# Patient Record
Sex: FEMALE | Race: WHITE | Employment: PART TIME | URBAN - METROPOLITAN AREA
[De-identification: names, ages, dates, MRNs, and addresses within clinical notes are randomized per-mention and may not be internally consistent; named-entity substitution may affect disease eponyms.]

---

## 2017-01-20 ENCOUNTER — LAB ENCOUNTER (OUTPATIENT)
Dept: LAB | Age: 52
End: 2017-01-20
Attending: FAMILY MEDICINE
Payer: COMMERCIAL

## 2017-01-20 ENCOUNTER — OFFICE VISIT (OUTPATIENT)
Dept: FAMILY MEDICINE CLINIC | Facility: CLINIC | Age: 52
End: 2017-01-20

## 2017-01-20 VITALS
RESPIRATION RATE: 18 BRPM | SYSTOLIC BLOOD PRESSURE: 112 MMHG | HEART RATE: 74 BPM | HEIGHT: 58.5 IN | WEIGHT: 127 LBS | TEMPERATURE: 99 F | BODY MASS INDEX: 25.95 KG/M2 | DIASTOLIC BLOOD PRESSURE: 68 MMHG

## 2017-01-20 DIAGNOSIS — F39 MOOD DISORDER (HCC): ICD-10-CM

## 2017-01-20 DIAGNOSIS — L70.0 CYSTIC ACNE: ICD-10-CM

## 2017-01-20 DIAGNOSIS — Z00.00 ANNUAL PHYSICAL EXAM: Primary | ICD-10-CM

## 2017-01-20 DIAGNOSIS — Z13.820 SCREENING FOR OSTEOPOROSIS: ICD-10-CM

## 2017-01-20 DIAGNOSIS — Z12.31 ENCOUNTER FOR SCREENING MAMMOGRAM FOR HIGH-RISK PATIENT: ICD-10-CM

## 2017-01-20 DIAGNOSIS — Z00.00 ANNUAL PHYSICAL EXAM: ICD-10-CM

## 2017-01-20 LAB
25-HYDROXYVITAMIN D (TOTAL): 40.3 NG/ML (ref 30–100)
ALBUMIN SERPL-MCNC: 3.9 G/DL (ref 3.5–4.8)
ALP LIVER SERPL-CCNC: 51 U/L (ref 39–100)
ALT SERPL-CCNC: 19 U/L (ref 14–54)
AST SERPL-CCNC: 14 U/L (ref 15–41)
BASOPHILS # BLD AUTO: 0.04 X10(3) UL (ref 0–0.1)
BASOPHILS NFR BLD AUTO: 0.6 %
BILIRUB SERPL-MCNC: 0.4 MG/DL (ref 0.1–2)
BUN BLD-MCNC: 15 MG/DL (ref 8–20)
CALCIUM BLD-MCNC: 8.8 MG/DL (ref 8.3–10.3)
CHLORIDE: 103 MMOL/L (ref 101–111)
CHOLEST SMN-MCNC: 210 MG/DL (ref ?–200)
CO2: 31 MMOL/L (ref 22–32)
CREAT BLD-MCNC: 0.83 MG/DL (ref 0.55–1.02)
EOSINOPHIL # BLD AUTO: 0.09 X10(3) UL (ref 0–0.3)
EOSINOPHIL NFR BLD AUTO: 1.4 %
ERYTHROCYTE [DISTWIDTH] IN BLOOD BY AUTOMATED COUNT: 13 % (ref 11.5–16)
FREE T4: 1.1 NG/DL (ref 0.9–1.8)
GLUCOSE BLD-MCNC: 90 MG/DL (ref 70–99)
HAV AB SERPL IA-ACNC: 344 PG/ML (ref 193–986)
HCT VFR BLD AUTO: 41.9 % (ref 34–50)
HDLC SERPL-MCNC: 102 MG/DL (ref 45–?)
HDLC SERPL: 2.06 {RATIO} (ref ?–4.44)
HGB BLD-MCNC: 14 G/DL (ref 12–16)
IMMATURE GRANULOCYTE COUNT: 0.02 X10(3) UL (ref 0–1)
IMMATURE GRANULOCYTE RATIO %: 0.3 %
LDLC SERPL CALC-MCNC: 98 MG/DL (ref ?–130)
LYMPHOCYTES # BLD AUTO: 1.37 X10(3) UL (ref 0.9–4)
LYMPHOCYTES NFR BLD AUTO: 21.5 %
M PROTEIN MFR SERPL ELPH: 7.1 G/DL (ref 6.1–8.3)
MCH RBC QN AUTO: 31 PG (ref 27–33.2)
MCHC RBC AUTO-ENTMCNC: 33.4 G/DL (ref 31–37)
MCV RBC AUTO: 92.7 FL (ref 81–100)
MONOCYTES # BLD AUTO: 0.59 X10(3) UL (ref 0.1–0.6)
MONOCYTES NFR BLD AUTO: 9.3 %
NEUTROPHIL ABS PRELIM: 4.26 X10 (3) UL (ref 1.3–6.7)
NEUTROPHILS # BLD AUTO: 4.26 X10(3) UL (ref 1.3–6.7)
NEUTROPHILS NFR BLD AUTO: 66.9 %
NONHDLC SERPL-MCNC: 108 MG/DL (ref ?–130)
PLATELET # BLD AUTO: 237 10(3)UL (ref 150–450)
POTASSIUM SERPL-SCNC: 4 MMOL/L (ref 3.6–5.1)
RBC # BLD AUTO: 4.52 X10(6)UL (ref 3.8–5.1)
RED CELL DISTRIBUTION WIDTH-SD: 43.9 FL (ref 35.1–46.3)
SODIUM SERPL-SCNC: 141 MMOL/L (ref 136–144)
T3FREE SERPL-MCNC: 2.73 PG/ML (ref 2.3–4.2)
TRIGLYCERIDES: 51 MG/DL (ref ?–150)
TSI SER-ACNC: 1.2 MIU/ML (ref 0.35–5.5)
VLDL: 10 MG/DL (ref 5–40)
WBC # BLD AUTO: 6.4 X10(3) UL (ref 4–13)

## 2017-01-20 PROCEDURE — 85025 COMPLETE CBC W/AUTO DIFF WBC: CPT

## 2017-01-20 PROCEDURE — 82306 VITAMIN D 25 HYDROXY: CPT

## 2017-01-20 PROCEDURE — 80061 LIPID PANEL: CPT

## 2017-01-20 PROCEDURE — 84439 ASSAY OF FREE THYROXINE: CPT

## 2017-01-20 PROCEDURE — 84443 ASSAY THYROID STIM HORMONE: CPT

## 2017-01-20 PROCEDURE — 84481 FREE ASSAY (FT-3): CPT

## 2017-01-20 PROCEDURE — 99396 PREV VISIT EST AGE 40-64: CPT | Performed by: FAMILY MEDICINE

## 2017-01-20 PROCEDURE — 36415 COLL VENOUS BLD VENIPUNCTURE: CPT

## 2017-01-20 PROCEDURE — 80053 COMPREHEN METABOLIC PANEL: CPT

## 2017-01-20 PROCEDURE — 82607 VITAMIN B-12: CPT

## 2017-01-20 RX ORDER — BUPROPION HYDROCHLORIDE 150 MG/1
150 TABLET ORAL DAILY
Qty: 30 TABLET | Refills: 0 | Status: SHIPPED | OUTPATIENT
Start: 2017-01-20 | End: 2017-04-20

## 2017-01-20 NOTE — PROGRESS NOTES
HPI:   Korey Wilson is a 49year old female who presents for a complete physical exam.     Wt Readings from Last 6 Encounters:  01/20/17 : 127 lb  12/08/16 : 126 lb  10/04/16 : 125 lb  09/28/16 : 127 lb  06/30/16 : 120 lb  05/17/16 : 120 lb    Body mass unspecified site    • Other B-complex deficiencies    • Unspecified vitamin D deficiency           Past Surgical History    D & C  1/1/99    TONSILLECTOMY      ENDOMETRIAL ABLATION      COLONOSCOPY  6/10/16      Family History   Problem Relation Age of Ons and affect, good eye contact, appropriate. ASSESSMENT AND PLAN:   Harshil Cortez is a 49year old female who presents for     1. Annual physical exam    - T4 FREE Sheryle Rouse; Future  - TSH; Future  - Shyla Capellan [id number];  Future  - Lipid Panel;

## 2017-02-03 ENCOUNTER — OFFICE VISIT (OUTPATIENT)
Dept: FAMILY MEDICINE CLINIC | Facility: CLINIC | Age: 52
End: 2017-02-03

## 2017-02-03 VITALS
BODY MASS INDEX: 26.66 KG/M2 | HEIGHT: 58 IN | WEIGHT: 127 LBS | RESPIRATION RATE: 14 BRPM | HEART RATE: 70 BPM | SYSTOLIC BLOOD PRESSURE: 82 MMHG | TEMPERATURE: 98 F | DIASTOLIC BLOOD PRESSURE: 64 MMHG

## 2017-02-03 DIAGNOSIS — J01.00 ACUTE MAXILLARY SINUSITIS, RECURRENCE NOT SPECIFIED: Primary | ICD-10-CM

## 2017-02-03 PROCEDURE — 99213 OFFICE O/P EST LOW 20 MIN: CPT | Performed by: NURSE PRACTITIONER

## 2017-02-03 RX ORDER — AMOXICILLIN AND CLAVULANATE POTASSIUM 875; 125 MG/1; MG/1
1 TABLET, FILM COATED ORAL 2 TIMES DAILY
Qty: 20 TABLET | Refills: 0 | Status: SHIPPED | OUTPATIENT
Start: 2017-02-03 | End: 2017-02-13

## 2017-02-03 NOTE — PATIENT INSTRUCTIONS
Self-Care for Sinusitis     Drinking plenty of water can help sinuses drain. Sinusitis can often be managed with self-care. Self-care can keep sinuses moist and make you feel more comfortable. Remember to follow your doctor's instructions closely.  This

## 2017-02-03 NOTE — PROGRESS NOTES
CHIEF COMPLAINT:   Patient presents with:  Sinus Problem      HPI:   Chad Mathis is a 49year old female who presents for cold symptoms for  11  days. Pt c/o Tickle in throat, nasal congestion, cough, pressure in head/headache.   Symptoms have progressed Alcohol Use: Yes           1.5 - 2.5 oz/week       3-5 Standard drinks or equivalent per week        REVIEW OF SYSTEMS:   GENERAL:  normal appetite  SKIN: no rashes or abnormal skin lesions  HEENT: See HPI.     LUNGS: denies shortness of breath or wheezin Drinking plenty of water can help sinuses drain. Sinusitis can often be managed with self-care. Self-care can keep sinuses moist and make you feel more comfortable. Remember to follow your doctor's instructions closely.  This can make a big difference in The patient indicates understanding of these issues and agrees to the plan. The patient is asked to return if sx's persist or worsen.

## 2017-04-17 RX ORDER — BUPROPION HYDROCHLORIDE 300 MG/1
TABLET ORAL
Qty: 30 TABLET | Refills: 0 | OUTPATIENT
Start: 2017-04-17

## 2017-04-17 RX ORDER — SPIRONOLACTONE 50 MG/1
TABLET, FILM COATED ORAL
Qty: 30 TABLET | Refills: 0 | OUTPATIENT
Start: 2017-04-17

## 2017-04-20 RX ORDER — BUPROPION HYDROCHLORIDE 300 MG/1
TABLET ORAL
Qty: 30 TABLET | Refills: 0 | Status: SHIPPED | OUTPATIENT
Start: 2017-04-20 | End: 2017-04-20

## 2017-04-20 RX ORDER — BUPROPION HYDROCHLORIDE 300 MG/1
300 TABLET ORAL
Qty: 30 TABLET | Refills: 0 | Status: SHIPPED | OUTPATIENT
Start: 2017-04-20 | End: 2017-05-18

## 2017-05-09 ENCOUNTER — OFFICE VISIT (OUTPATIENT)
Dept: FAMILY MEDICINE CLINIC | Facility: CLINIC | Age: 52
End: 2017-05-09

## 2017-05-09 VITALS
BODY MASS INDEX: 26.66 KG/M2 | OXYGEN SATURATION: 98 % | SYSTOLIC BLOOD PRESSURE: 112 MMHG | TEMPERATURE: 98 F | DIASTOLIC BLOOD PRESSURE: 76 MMHG | HEART RATE: 76 BPM | HEIGHT: 58 IN | RESPIRATION RATE: 18 BRPM | WEIGHT: 127 LBS

## 2017-05-09 DIAGNOSIS — J01.11 ACUTE RECURRENT FRONTAL SINUSITIS: Primary | ICD-10-CM

## 2017-05-09 PROCEDURE — 99213 OFFICE O/P EST LOW 20 MIN: CPT | Performed by: NURSE PRACTITIONER

## 2017-05-09 RX ORDER — DOXYCYCLINE HYCLATE 100 MG/1
100 CAPSULE ORAL 2 TIMES DAILY
Qty: 20 CAPSULE | Refills: 0 | Status: SHIPPED | OUTPATIENT
Start: 2017-05-09 | End: 2017-05-19

## 2017-05-09 RX ORDER — PREDNISONE 20 MG/1
40 TABLET ORAL DAILY
Qty: 10 TABLET | Refills: 0 | Status: SHIPPED | OUTPATIENT
Start: 2017-05-09 | End: 2017-05-14

## 2017-05-09 NOTE — PROGRESS NOTES
CHIEF COMPLAINT:   Patient presents with:  Sinus Problem: sinus pressure, ear pain, congestion x 1 week       HPI:   Tito Marrero is a 47year old female who presents for cold symptoms for  4  months.  Symptoms have progressed into sinus congestion and be 2 diabetes [Other] [OTHER] Father         Smoking Status: Current Some Day Smoker         Packs/Day: 0.30  Years: .5        Types: Cigarettes    Smokeless Status: Never Used                        Alcohol Use: Yes           1.5 - 2.5 oz/week       3-5 Nico Cheney to seek higher level of care    Meds & Refills for this Visit:    Signed Prescriptions Disp Refills    Doxycycline Hyclate 100 MG Oral Cap 20 capsule 0      Sig: Take 1 capsule (100 mg total) by mouth 2 (two) times daily.       predniSONE 20 MG Oral Tab 10 pseudoephedrine. Avoid products that combine ingredients, because side effects may be increased. Read labels. You can also ask the pharmacist for help. (NOTE: Persons with high blood pressure should not use decongestants.  They can raise blood pressure.)  ·

## 2017-05-18 RX ORDER — BUPROPION HYDROCHLORIDE 300 MG/1
TABLET ORAL
Qty: 30 TABLET | Refills: 0 | Status: SHIPPED | OUTPATIENT
Start: 2017-05-18 | End: 2017-06-24

## 2017-06-05 RX ORDER — SPIRONOLACTONE 50 MG/1
TABLET, FILM COATED ORAL
Qty: 30 TABLET | Refills: 0 | Status: SHIPPED | OUTPATIENT
Start: 2017-06-05 | End: 2017-07-06

## 2017-06-27 RX ORDER — BUPROPION HYDROCHLORIDE 300 MG/1
TABLET ORAL
Qty: 30 TABLET | Refills: 0 | Status: SHIPPED | OUTPATIENT
Start: 2017-06-27 | End: 2017-08-14

## 2017-07-06 RX ORDER — SPIRONOLACTONE 50 MG/1
TABLET, FILM COATED ORAL
Qty: 60 TABLET | Refills: 0 | Status: SHIPPED | OUTPATIENT
Start: 2017-07-06 | End: 2017-10-25 | Stop reason: ALTCHOICE

## 2017-07-13 NOTE — PROGRESS NOTES
Patient of Dr. Jamie Massey comes in out of great frustration for numerous issues that are bothering her in life. She is currently in the process of divorce. Busy mother . Steven.  Is further bothered with chronic ADHD symptoms.   Has been battling H p

## 2017-08-14 RX ORDER — BUPROPION HYDROCHLORIDE 300 MG/1
TABLET ORAL
Qty: 30 TABLET | Refills: 0 | Status: SHIPPED | OUTPATIENT
Start: 2017-08-14 | End: 2017-11-07

## 2017-10-25 ENCOUNTER — OFFICE VISIT (OUTPATIENT)
Dept: FAMILY MEDICINE CLINIC | Facility: CLINIC | Age: 52
End: 2017-10-25

## 2017-10-25 VITALS
OXYGEN SATURATION: 98 % | DIASTOLIC BLOOD PRESSURE: 70 MMHG | HEIGHT: 58 IN | RESPIRATION RATE: 18 BRPM | WEIGHT: 132 LBS | BODY MASS INDEX: 27.71 KG/M2 | HEART RATE: 67 BPM | SYSTOLIC BLOOD PRESSURE: 112 MMHG | TEMPERATURE: 98 F

## 2017-10-25 DIAGNOSIS — R30.0 DYSURIA: Primary | ICD-10-CM

## 2017-10-25 PROCEDURE — 87086 URINE CULTURE/COLONY COUNT: CPT | Performed by: NURSE PRACTITIONER

## 2017-10-25 PROCEDURE — 81003 URINALYSIS AUTO W/O SCOPE: CPT | Performed by: NURSE PRACTITIONER

## 2017-10-25 PROCEDURE — 99213 OFFICE O/P EST LOW 20 MIN: CPT | Performed by: NURSE PRACTITIONER

## 2017-10-25 RX ORDER — NITROFURANTOIN 25; 75 MG/1; MG/1
100 CAPSULE ORAL 2 TIMES DAILY
Qty: 14 CAPSULE | Refills: 0 | Status: SHIPPED | OUTPATIENT
Start: 2017-10-25 | End: 2017-11-01

## 2017-10-25 NOTE — PATIENT INSTRUCTIONS
Dysuria     Painful urination (dysuria) is often caused by a problem in the urinary tract. Dysuria is pain felt during urination. It is often described as a burning. Learn more about this problem and how it can be treated. What causes dysuria?   Possib · Rash or joint pain  · Increased back or abdominal pain  · Enlarged painful lymph nodes (lumps) in the groin   Date Last Reviewed: 1/1/2017  © 1862-1685 [employer]. [address] [facility], [address] [place] [address]. All rights reserved.  This inform

## 2017-10-25 NOTE — PROGRESS NOTES
CHIEF COMPLAINT:   Patient presents with:  Burning On Urination: x 6 days      HPI:   Jinx Frankel is a 47year old female who presents with symptoms of UTI. Complaining of urinary , dysuria for last 5 days.  Symptoms have been slightly improved since o Standard drinks or equivalent: 3 - 5 per week        REVIEW OF SYSTEMS:   GENERAL: Denies fever, chills, or body aches  SKIN: no rashes, no skin wounds or ulcers.   EYES:denies blurred vision or double vision  HEENT: no congestion, rhinorrhea, sore throa PLAN: Meds as listed below. Comfort measures as described in Patient Instructions. Patient/Parent has given us consent to send out a culture and understand that they will be contacted in 2-3 days with culture results.  If any severe back pain, inability to Some parts of the urinalysis may be done in the provider's office and some in a lab. And, the urine sample may be checked for bacteria and yeast (urine culture). Your healthcare provider will tell you more about these tests if they are needed.   How is dysu

## 2017-10-27 ENCOUNTER — TELEPHONE (OUTPATIENT)
Dept: FAMILY MEDICINE CLINIC | Facility: CLINIC | Age: 52
End: 2017-10-27

## 2017-10-27 NOTE — TELEPHONE ENCOUNTER
Spoke with patient further regarding negative urine culture results.  States she is still having similar symptoms with the antibiotic, also stated she has a hx of rectal prolapse, due for annual women exam. Recommended follow up for that, may stop antibioti

## 2017-11-08 RX ORDER — BUPROPION HYDROCHLORIDE 300 MG/1
TABLET ORAL
Qty: 30 TABLET | Refills: 0 | Status: SHIPPED | OUTPATIENT
Start: 2017-11-08 | End: 2020-07-21

## 2018-02-16 ENCOUNTER — TELEPHONE (OUTPATIENT)
Dept: FAMILY MEDICINE CLINIC | Facility: CLINIC | Age: 53
End: 2018-02-16

## 2018-02-16 DIAGNOSIS — F06.30 MOOD DISORDER IN CONDITIONS CLASSIFIED ELSEWHERE: ICD-10-CM

## 2018-02-16 RX ORDER — ALPRAZOLAM 0.25 MG/1
TABLET ORAL
Qty: 30 TABLET | Refills: 0 | Status: CANCELLED | COMMUNITY
Start: 2018-02-16

## 2018-02-27 ENCOUNTER — HOSPITAL ENCOUNTER (OUTPATIENT)
Dept: MAMMOGRAPHY | Age: 53
Discharge: HOME | End: 2018-02-27
Attending: OBSTETRICS & GYNECOLOGY
Payer: COMMERCIAL

## 2018-02-27 DIAGNOSIS — Z12.31 ENCOUNTER FOR SCREENING MAMMOGRAM FOR MALIGNANT NEOPLASM OF BREAST: ICD-10-CM

## 2018-02-27 PROCEDURE — 77067 SCR MAMMO BI INCL CAD: CPT | Performed by: OBSTETRICS & GYNECOLOGY

## 2018-03-10 DIAGNOSIS — F06.30 MOOD DISORDER IN CONDITIONS CLASSIFIED ELSEWHERE: ICD-10-CM

## 2018-03-12 RX ORDER — ALPRAZOLAM 0.25 MG/1
TABLET ORAL
Qty: 30 TABLET | OUTPATIENT
Start: 2018-03-12

## 2020-07-21 ENCOUNTER — OFFICE VISIT (OUTPATIENT)
Dept: INTERNAL MEDICINE CLINIC | Facility: CLINIC | Age: 55
End: 2020-07-21
Payer: MEDICAID

## 2020-07-21 VITALS
BODY MASS INDEX: 26.24 KG/M2 | HEIGHT: 58 IN | DIASTOLIC BLOOD PRESSURE: 58 MMHG | SYSTOLIC BLOOD PRESSURE: 102 MMHG | RESPIRATION RATE: 16 BRPM | OXYGEN SATURATION: 97 % | WEIGHT: 125 LBS | TEMPERATURE: 98 F | HEART RATE: 76 BPM

## 2020-07-21 DIAGNOSIS — R42 VERTIGO: Primary | ICD-10-CM

## 2020-07-21 DIAGNOSIS — F41.1 GAD (GENERALIZED ANXIETY DISORDER): ICD-10-CM

## 2020-07-21 DIAGNOSIS — F06.30 MOOD DISORDER IN CONDITIONS CLASSIFIED ELSEWHERE: ICD-10-CM

## 2020-07-21 PROCEDURE — 99204 OFFICE O/P NEW MOD 45 MIN: CPT | Performed by: INTERNAL MEDICINE

## 2020-07-21 PROCEDURE — 3008F BODY MASS INDEX DOCD: CPT | Performed by: INTERNAL MEDICINE

## 2020-07-21 PROCEDURE — 3074F SYST BP LT 130 MM HG: CPT | Performed by: INTERNAL MEDICINE

## 2020-07-21 PROCEDURE — 3078F DIAST BP <80 MM HG: CPT | Performed by: INTERNAL MEDICINE

## 2020-07-21 RX ORDER — ALPRAZOLAM 0.25 MG/1
0.25 TABLET ORAL
Qty: 14 TABLET | Refills: 0 | Status: SHIPPED | OUTPATIENT
Start: 2020-07-21 | End: 2020-07-22

## 2020-07-21 RX ORDER — BUPROPION HYDROCHLORIDE 150 MG/1
150 TABLET, EXTENDED RELEASE ORAL DAILY
Qty: 90 TABLET | Refills: 0 | Status: SHIPPED | OUTPATIENT
Start: 2020-07-21 | End: 2020-07-22

## 2020-07-21 NOTE — PROGRESS NOTES
Ximena Abarca  5/5/1965    Patient presents with:  Establish Care: RG rm 7 New pt. vertigo on and off, pressure in ears happening more frequently. Thelma Cabot is a 48year old female who presents to establish care.     The patient has • Cholecalciferol (VITAMIN D-3) 5000 UNITS Oral Tab Take 1 tablet by mouth.           Flagyl [Metronidazo*    HIVES, SWELLING   Past Medical History:   Diagnosis Date   • Abnormal maternal glucose tolerance, complicating pregnancy, childbirth, or the puer normal and breath sounds normal. No respiratory distress. Abdominal: Soft. Bowel sounds are normal. Non tender, no masses, no organomegaly or hernias. Musculoskeletal: No edema  Lymphadenopathy: No cervical adenopathy.    Neurological: Motor 5 out of 5 al MD

## 2020-07-22 DIAGNOSIS — F06.30 MOOD DISORDER IN CONDITIONS CLASSIFIED ELSEWHERE: ICD-10-CM

## 2020-07-22 RX ORDER — ALPRAZOLAM 0.25 MG/1
0.25 TABLET ORAL
Qty: 14 TABLET | Refills: 0 | Status: SHIPPED | OUTPATIENT
Start: 2020-07-22 | End: 2021-01-11

## 2020-07-22 RX ORDER — BUPROPION HYDROCHLORIDE 150 MG/1
150 TABLET, EXTENDED RELEASE ORAL DAILY
Qty: 90 TABLET | Refills: 0 | Status: SHIPPED | OUTPATIENT
Start: 2020-07-22 | End: 2021-01-11

## 2020-07-22 NOTE — TELEPHONE ENCOUNTER
Last Ov: 7/21/20, AD, est care  Last labs: CMP, Vit B12, Vit D, CBC, Lipid, Free T3, TSH, Free T4 1/20/17  Last Rx:  Bupropion ER 150mg, #90, 0R 7/21/20  Alprazolam 0.25mg, #14, 0R 7/21/20    No future appointments.     Per Protocol - neither on protocol, R

## 2020-07-22 NOTE — TELEPHONE ENCOUNTER
buPROPion HCl ER, SR, 150 MG Oral     ALPRAZolam 0.25 MG Oral Tab      Pls send both to [address] [facility] won't accept the ALPRAZolam 0.25 MG Oral Tab, pls send to local [facility]     Pharmacy's have been updated in [address]

## 2020-08-26 ENCOUNTER — TELEPHONE (OUTPATIENT)
Dept: INTERNAL MEDICINE CLINIC | Facility: CLINIC | Age: 55
End: 2020-08-26

## 2020-08-26 DIAGNOSIS — R42 VERTIGO: Primary | ICD-10-CM

## 2020-08-26 NOTE — TELEPHONE ENCOUNTER
Leticia Adjutant  [facility] Cc: [facility] Referral Pool   Phone Number: [phone]             .Reason for the order/referral:OTOLARYNGOLOGY/CONSULT   PCP:JACQUIE   Refer to Provider Dulce RAJPUT   Specialty:OTOLARYNGOLOGY   Patient I

## 2020-09-21 PROBLEM — H81.20 VESTIBULAR NEURITIS, UNSPECIFIED LATERALITY: Status: ACTIVE | Noted: 2020-09-21

## 2020-09-21 PROBLEM — H93.299 ABNORMAL AUDITORY PERCEPTION, UNSPECIFIED LATERALITY: Status: ACTIVE | Noted: 2020-09-21

## 2020-09-21 PROBLEM — R42 DIZZINESS: Status: ACTIVE | Noted: 2020-09-21

## 2020-09-24 ENCOUNTER — TELEPHONE (OUTPATIENT)
Dept: INTERNAL MEDICINE CLINIC | Facility: CLINIC | Age: 55
End: 2020-09-24

## 2020-09-24 DIAGNOSIS — Z13.0 SCREENING FOR BLOOD DISEASE: ICD-10-CM

## 2020-09-24 DIAGNOSIS — Z13.228 SCREENING FOR METABOLIC DISORDER: ICD-10-CM

## 2020-09-24 DIAGNOSIS — Z13.220 SCREENING FOR LIPID DISORDERS: ICD-10-CM

## 2020-09-24 DIAGNOSIS — Z13.29 SCREENING FOR THYROID DISORDER: ICD-10-CM

## 2020-09-24 DIAGNOSIS — Z00.00 ROUTINE GENERAL MEDICAL EXAMINATION AT A HEALTH CARE FACILITY: Primary | ICD-10-CM

## 2020-09-24 NOTE — TELEPHONE ENCOUNTER
Future Appointments   Date Time Provider Fiordaliza Mcdonoughi   9/30/2020  8:40 AM Marii Carlos MD [facility]   10/2/2020  7:00 AM Loura Pencil, AUD LOMENT [facility]     FOR CPE      Pt would like fasting labs sent to THE [facility] pls.  Pt a

## 2020-09-30 ENCOUNTER — OFFICE VISIT (OUTPATIENT)
Dept: INTERNAL MEDICINE CLINIC | Facility: CLINIC | Age: 55
End: 2020-09-30
Payer: MEDICAID

## 2020-09-30 VITALS
HEART RATE: 72 BPM | OXYGEN SATURATION: 97 % | RESPIRATION RATE: 16 BRPM | WEIGHT: 129 LBS | SYSTOLIC BLOOD PRESSURE: 102 MMHG | BODY MASS INDEX: 26.71 KG/M2 | TEMPERATURE: 97 F | HEIGHT: 58.27 IN | DIASTOLIC BLOOD PRESSURE: 64 MMHG

## 2020-09-30 DIAGNOSIS — Z12.4 SCREENING FOR CERVICAL CANCER: ICD-10-CM

## 2020-09-30 DIAGNOSIS — Z12.31 ENCOUNTER FOR SCREENING MAMMOGRAM FOR MALIGNANT NEOPLASM OF BREAST: ICD-10-CM

## 2020-09-30 DIAGNOSIS — H81.20 VESTIBULAR NEURITIS, UNSPECIFIED LATERALITY: ICD-10-CM

## 2020-09-30 DIAGNOSIS — Z13.0 SCREENING FOR BLOOD DISEASE: ICD-10-CM

## 2020-09-30 DIAGNOSIS — Z12.11 SCREEN FOR COLON CANCER: ICD-10-CM

## 2020-09-30 DIAGNOSIS — E55.9 VITAMIN D DEFICIENCY: ICD-10-CM

## 2020-09-30 DIAGNOSIS — Z00.00 ANNUAL PHYSICAL EXAM: Primary | ICD-10-CM

## 2020-09-30 DIAGNOSIS — Z13.228 SCREENING FOR METABOLIC DISORDER: ICD-10-CM

## 2020-09-30 DIAGNOSIS — R45.89 DEPRESSED MOOD: ICD-10-CM

## 2020-09-30 DIAGNOSIS — Z13.220 SCREENING FOR LIPID DISORDERS: ICD-10-CM

## 2020-09-30 DIAGNOSIS — F41.1 GAD (GENERALIZED ANXIETY DISORDER): ICD-10-CM

## 2020-09-30 DIAGNOSIS — Z13.29 THYROID DISORDER SCREENING: ICD-10-CM

## 2020-09-30 PROCEDURE — 3074F SYST BP LT 130 MM HG: CPT | Performed by: INTERNAL MEDICINE

## 2020-09-30 PROCEDURE — 99396 PREV VISIT EST AGE 40-64: CPT | Performed by: INTERNAL MEDICINE

## 2020-09-30 PROCEDURE — 3078F DIAST BP <80 MM HG: CPT | Performed by: INTERNAL MEDICINE

## 2020-09-30 PROCEDURE — 3008F BODY MASS INDEX DOCD: CPT | Performed by: INTERNAL MEDICINE

## 2020-09-30 PROCEDURE — 87624 HPV HI-RISK TYP POOLED RSLT: CPT | Performed by: INTERNAL MEDICINE

## 2020-09-30 PROCEDURE — 88175 CYTOPATH C/V AUTO FLUID REDO: CPT | Performed by: INTERNAL MEDICINE

## 2020-09-30 RX ORDER — FLUTICASONE PROPIONATE 50 MCG
SPRAY, SUSPENSION (ML) NASAL DAILY
COMMUNITY
End: 2021-12-09 | Stop reason: ALTCHOICE

## 2020-09-30 RX ORDER — LORATADINE 10 MG/1
10 TABLET ORAL DAILY
COMMUNITY
End: 2021-12-09 | Stop reason: ALTCHOICE

## 2020-09-30 NOTE — PROGRESS NOTES
Reba Jean  5/5/1965    Patient presents with:  Physical: RG rm 7 Physical      HPI:   Reba Jean is a 48year old female who presents for an annual physical examination.     The patient notes a significant improvement in mood and irritability follo disorder)     RUQ abdominal pain     Encounter for screening colonoscopy     H. pylori infection     Gastritis     Benign colon polyp     Abnormal auditory perception, unspecified laterality     Dizziness     Vestibular neuritis, unspecified laterality repeat evaluation.   Screening for cervical cancer: Pap smear with HPV completed today (under supervision of Jean-Pierre Dunlap)  Screening for breast cancer: Mammogram ordered  Screening for lung cancer: Does not meet criteria  Pneumococcal and influenza immunizat

## 2020-10-19 ENCOUNTER — TELEPHONE (OUTPATIENT)
Dept: INTERNAL MEDICINE CLINIC | Facility: CLINIC | Age: 55
End: 2020-10-19

## 2020-10-19 DIAGNOSIS — Z12.11 SCREEN FOR COLON CANCER: Primary | ICD-10-CM

## 2020-10-20 ENCOUNTER — IMMUNIZATION (OUTPATIENT)
Dept: INTERNAL MEDICINE CLINIC | Facility: CLINIC | Age: 55
End: 2020-10-20
Payer: MEDICAID

## 2020-10-20 DIAGNOSIS — Z23 NEED FOR VACCINATION: ICD-10-CM

## 2020-10-20 PROCEDURE — 90686 IIV4 VACC NO PRSV 0.5 ML IM: CPT | Performed by: INTERNAL MEDICINE

## 2020-10-20 PROCEDURE — 90471 IMMUNIZATION ADMIN: CPT | Performed by: INTERNAL MEDICINE

## 2020-11-02 ENCOUNTER — TELEPHONE (OUTPATIENT)
Dept: PHYSICAL THERAPY | Age: 55
End: 2020-11-02

## 2020-11-03 ENCOUNTER — OFFICE VISIT (OUTPATIENT)
Dept: PHYSICAL THERAPY | Age: 55
End: 2020-11-03
Attending: OTOLARYNGOLOGY
Payer: MEDICAID

## 2020-11-03 DIAGNOSIS — R42 DIZZINESS: ICD-10-CM

## 2020-11-03 DIAGNOSIS — H81.20 VESTIBULAR NEURITIS, UNSPECIFIED LATERALITY: ICD-10-CM

## 2020-11-03 PROCEDURE — 97162 PT EVAL MOD COMPLEX 30 MIN: CPT

## 2020-11-03 PROCEDURE — 97112 NEUROMUSCULAR REEDUCATION: CPT

## 2020-11-03 NOTE — PROGRESS NOTES
VESTIBULAR EVALUATION:   Referring Physician: Dr. Tessa St  Diagnosis: Vestibular neuritis, unspecified laterality (H81.20);  Dizziness (R42)     Date of Service: 11/3/2020     PATIENT SUMMARY   Oneida Barker is a 48year old female who presents to ther same time as the shingesl. From Dr. Benton Stewart office visit on 09/21/2020: \"Has had week long episodes of room spinning. Worse when moving but can occur when still. Mild otalgia on the right. Has been given abx and flonase with no improvement.   No when she needed to step off       Today's Treatment and Response:   Pt education was provided on exam findings, treatment diagnosis, treatment plan, expectations, and prognosis. Pt was also provided recommendations for possible dizziness after evaluation. certification required: Yes  I certify the need for these services furnished under this plan of treatment and while under my care.     X___________________________________________________ Date____________________    Certification From: 37/6/3534  To:2/1/202

## 2020-11-06 ENCOUNTER — OFFICE VISIT (OUTPATIENT)
Dept: PHYSICAL THERAPY | Age: 55
End: 2020-11-06
Attending: OTOLARYNGOLOGY
Payer: MEDICAID

## 2020-11-06 PROCEDURE — 97112 NEUROMUSCULAR REEDUCATION: CPT

## 2020-11-06 PROCEDURE — 97116 GAIT TRAINING THERAPY: CPT

## 2020-11-06 NOTE — PROGRESS NOTES
Dx: Vestibular neuritis, unspecified laterality (H81.20); Dizziness (R42)             Insurance (Authorized # of Visits):  [facility] Medicaid [facility]           Authorizing Provider: Dr. Tin Fernandez MD visit: N/S           Subjective:  Went hiking Wednesday and that up x5 bouts       HEP: 11/6/2020: horizontal VOR against busy background   Charges: NMRE x2, Gait Train x1      Total Timed Treatment: 40 min  Total Treatment Time: 40 min

## 2020-11-10 ENCOUNTER — OFFICE VISIT (OUTPATIENT)
Dept: PHYSICAL THERAPY | Age: 55
End: 2020-11-10
Attending: OTOLARYNGOLOGY
Payer: MEDICAID

## 2020-11-10 PROCEDURE — 97112 NEUROMUSCULAR REEDUCATION: CPT

## 2020-11-10 PROCEDURE — 97116 GAIT TRAINING THERAPY: CPT

## 2020-11-10 NOTE — PROGRESS NOTES
Dx: Vestibular neuritis, unspecified laterality (H81.20);  Dizziness (R42)             Insurance (Authorized # of Visits):  [facility] Medicaid [facility]           Authorizing Provider: Dr. Clemencia Fernandez MD visit: N/S           Subjective: Dizziness comes and goes-- hillary with head clocks x5  Tandem stance with horizontal head turns on foam 3x30\"      Gait Train  Tandem walking with head up x5 gym lengths  Narrow walking on foam beams x2 bouts  Narrow walking on foam beams with head up x5 bouts Gait Train  Tandem walking w

## 2020-11-12 ENCOUNTER — LAB ENCOUNTER (OUTPATIENT)
Dept: LAB | Age: 55
End: 2020-11-12
Attending: INTERNAL MEDICINE
Payer: MEDICAID

## 2020-11-12 DIAGNOSIS — Z13.29 THYROID DISORDER SCREENING: ICD-10-CM

## 2020-11-12 DIAGNOSIS — Z13.0 SCREENING FOR BLOOD DISEASE: ICD-10-CM

## 2020-11-12 DIAGNOSIS — Z13.228 SCREENING FOR METABOLIC DISORDER: ICD-10-CM

## 2020-11-12 DIAGNOSIS — Z13.29 SCREENING FOR THYROID DISORDER: ICD-10-CM

## 2020-11-12 DIAGNOSIS — Z13.220 SCREENING FOR LIPID DISORDERS: ICD-10-CM

## 2020-11-12 DIAGNOSIS — Z00.00 ROUTINE GENERAL MEDICAL EXAMINATION AT A HEALTH CARE FACILITY: ICD-10-CM

## 2020-11-12 DIAGNOSIS — Z00.00 ANNUAL PHYSICAL EXAM: ICD-10-CM

## 2020-11-12 DIAGNOSIS — E55.9 VITAMIN D DEFICIENCY: ICD-10-CM

## 2020-11-12 PROCEDURE — 82306 VITAMIN D 25 HYDROXY: CPT

## 2020-11-12 PROCEDURE — 85025 COMPLETE CBC W/AUTO DIFF WBC: CPT

## 2020-11-12 PROCEDURE — 80061 LIPID PANEL: CPT

## 2020-11-12 PROCEDURE — 36415 COLL VENOUS BLD VENIPUNCTURE: CPT

## 2020-11-12 PROCEDURE — 80053 COMPREHEN METABOLIC PANEL: CPT

## 2020-11-12 PROCEDURE — 84443 ASSAY THYROID STIM HORMONE: CPT

## 2020-11-17 ENCOUNTER — OFFICE VISIT (OUTPATIENT)
Dept: PHYSICAL THERAPY | Age: 55
End: 2020-11-17
Attending: OTOLARYNGOLOGY
Payer: MEDICAID

## 2020-11-17 PROCEDURE — 97116 GAIT TRAINING THERAPY: CPT

## 2020-11-17 PROCEDURE — 97112 NEUROMUSCULAR REEDUCATION: CPT

## 2020-11-17 NOTE — PROGRESS NOTES
Dx: Vestibular neuritis, unspecified laterality (H81.20);  Dizziness (R42)             Insurance (Authorized # of Visits):  [facility] Medicaid [facility]           Authorizing Provider: Dr. Tamera Mabry  Next MD visit: N/S           Subjective: Has been inconsistent with HEP horizontal VOR x1 against busy wall 3x30\"  NBOS on AirEx with head clocks x4  Visual acuity with head shaking NMRE  Seated horizontal VOR x3 against busy wall 3x30\"  Seated VOR x2 3x30\"  NBOS on AirEx with head clocks x5  Tandem stance with horizontal h

## 2020-11-24 ENCOUNTER — OFFICE VISIT (OUTPATIENT)
Dept: PHYSICAL THERAPY | Age: 55
End: 2020-11-24
Attending: OTOLARYNGOLOGY
Payer: MEDICAID

## 2020-11-24 PROCEDURE — 97116 GAIT TRAINING THERAPY: CPT

## 2020-11-24 PROCEDURE — 97112 NEUROMUSCULAR REEDUCATION: CPT

## 2020-11-24 NOTE — PROGRESS NOTES
Dx: Vestibular neuritis, unspecified laterality (H81.20); Dizziness (R42)             Insurance (Authorized # of Visits):  [facility] Medicaid [facility]           Authorizing Provider: Dr. Boni Fernandez MD visit: N/S           Subjective: 4-5/10 intensity.   \"Kasey fee VOR x3 against busy wall 3x30\"  Seated VOR x2 3x30\"  NBOS on AirEx with head clocks x5  Tandem stance with horizontal head turns on foam 3x30\" NMRE  Seated horizontal VOR x3 against busy wall 3x30\"  Seated VOR x2 using post-it letter 3x30\"  NBOS on

## 2020-12-01 ENCOUNTER — APPOINTMENT (OUTPATIENT)
Dept: PHYSICAL THERAPY | Age: 55
End: 2020-12-01
Attending: OTOLARYNGOLOGY
Payer: MEDICAID

## 2020-12-01 ENCOUNTER — TELEPHONE (OUTPATIENT)
Dept: PHYSICAL THERAPY | Facility: HOSPITAL | Age: 55
End: 2020-12-01

## 2020-12-03 ENCOUNTER — TELEPHONE (OUTPATIENT)
Dept: INTERNAL MEDICINE CLINIC | Facility: CLINIC | Age: 55
End: 2020-12-03

## 2020-12-03 NOTE — TELEPHONE ENCOUNTER
Pt stated she didn't want to go to a meeting at work so she told boss she wasn't well. Artie Franz now asking for dr note. Pt asking we AD can write for her. Per nurses, we can't set up virtual appt nor write her a note. Pt was advised.

## 2020-12-08 ENCOUNTER — APPOINTMENT (OUTPATIENT)
Dept: PHYSICAL THERAPY | Age: 55
End: 2020-12-08
Attending: OTOLARYNGOLOGY
Payer: MEDICAID

## 2020-12-15 ENCOUNTER — APPOINTMENT (OUTPATIENT)
Dept: PHYSICAL THERAPY | Age: 55
End: 2020-12-15
Attending: OTOLARYNGOLOGY
Payer: MEDICAID

## 2020-12-16 ENCOUNTER — OFFICE VISIT (OUTPATIENT)
Dept: PHYSICAL THERAPY | Age: 55
End: 2020-12-16
Attending: OTOLARYNGOLOGY
Payer: MEDICAID

## 2020-12-16 PROCEDURE — 97112 NEUROMUSCULAR REEDUCATION: CPT | Performed by: PHYSICAL THERAPIST

## 2020-12-16 NOTE — PROGRESS NOTES
Dx: Vestibular neuritis, unspecified laterality (H81.20);  Dizziness (R42)             Insurance (Authorized # of Visits):  [facility] Medicaid [facility]           Authorizing Provider: Dr. Bridger Fernandez MD visit: N/S           Subjective: Pt reports dizziness at 2-3/10 symptoms to improve ability to check blind spot while driving  · Modified-CTSIB = WNL in all phases  · Reach/look up to grab items from top shelf without symptoms  · Ambulate at least 20' with eyes closed, for improved gait at night   · Dizziness Handicap bouts  Narrow walking on foam beams with head up x5 bouts Gait Train  Tandem walking with head up x5 gym lengths  Narrow walking on foam beams x2 bouts  Narrow walking on foam beams with head up x5 bouts  Walking VOR fwd, retro 3x30\"ea    HEP: 11/10/2020:

## 2020-12-23 ENCOUNTER — OFFICE VISIT (OUTPATIENT)
Dept: PHYSICAL THERAPY | Age: 55
End: 2020-12-23
Attending: OTOLARYNGOLOGY
Payer: MEDICAID

## 2020-12-23 PROCEDURE — 97112 NEUROMUSCULAR REEDUCATION: CPT

## 2020-12-23 NOTE — PROGRESS NOTES
Dx: Vestibular neuritis, unspecified laterality (H81.20);  Dizziness (R42)             Insurance (Authorized # of Visits):  [facility] Medicaid [facility]           Authorizing Provider: Dr. Rosa Elmore  Next MD visit: N/S           Subjective: Had a couple of super dizzy da head quickly without symptoms to improve ability to check blind spot while driving  · Modified-CTSIB = WNL in all phases  · Reach/look up to grab items from top shelf without symptoms  · Ambulate at least 20' with eyes closed, for improved gait at night walking with head up x5 gym lengths  Narrow walking on foam beams x2 bouts  Narrow walking on foam beams with head up x5 bouts Gait Train  Tandem walking with head up x5 gym lengths  Narrow walking on foam beams x2 bouts  Narrow walking on foam beams with

## 2020-12-28 ENCOUNTER — APPOINTMENT (OUTPATIENT)
Dept: PHYSICAL THERAPY | Age: 55
End: 2020-12-28
Attending: OTOLARYNGOLOGY
Payer: MEDICAID

## 2020-12-30 ENCOUNTER — OFFICE VISIT (OUTPATIENT)
Dept: PHYSICAL THERAPY | Age: 55
End: 2020-12-30
Attending: OTOLARYNGOLOGY
Payer: MEDICAID

## 2020-12-30 PROCEDURE — 97112 NEUROMUSCULAR REEDUCATION: CPT

## 2020-12-30 NOTE — PROGRESS NOTES
ProgressSummary  Pt has attended 8 visits in Physical Therapy. Dx: Vestibular neuritis, unspecified laterality (H81.20);  Dizziness (R42)             Insurance (Authorized # of Visits):  [facility] Medicaid [facility]           Authorizing Provider: Dr. Antony Lees  Next WNL in all phases  --  GOAL MET (12/30/2020)  · Reach/look up to grab items from top shelf without symptoms  --  IN PROGRESS  · Ambulate at least 20' with eyes closed, for improved gait at night  --  IN PROGRESS  · Dizziness Handicap Inventory = 40/100  -- rehab and progression at home NMRE  Standing feet tog VOR I in busy background  Standing  Semi-tandem VOR-vertical on busy background  Feet tog EC 30 sec X 3  Tandem stance EO with head turns 30 sec X 3 R/L NMRE  Standing feet tog VOR I in busy background

## 2021-01-05 ENCOUNTER — TELEPHONE (OUTPATIENT)
Dept: PHYSICAL THERAPY | Age: 56
End: 2021-01-05

## 2021-01-05 ENCOUNTER — OFFICE VISIT (OUTPATIENT)
Dept: PHYSICAL THERAPY | Age: 56
End: 2021-01-05
Attending: OTOLARYNGOLOGY
Payer: MEDICAID

## 2021-01-05 PROCEDURE — 97112 NEUROMUSCULAR REEDUCATION: CPT | Performed by: PHYSICAL THERAPIST

## 2021-01-05 PROCEDURE — 97530 THERAPEUTIC ACTIVITIES: CPT | Performed by: PHYSICAL THERAPIST

## 2021-01-05 NOTE — PROGRESS NOTES
Dx: Vestibular neuritis, unspecified laterality (H81.20);  Dizziness (R42)             Insurance (Authorized # of Visits):  [facility] Medicaid [facility]           Authorizing Provider: Dr. Guevara Deaquintin Fernandez MD visit: Neurologist appt at end of January 2021           Avani Rodriguez Negative   Cover/Uncover: Negative   Cross Cover: Negative   Head Shaking Nystagmus: Negative   Dynamic Visual Acuity: Positive 5 row discrepancy; pt is unable to focus with head movements    Positional testing: Haris Mansfield test negative     Assessment: on meditation and deep breathing exercises         HEP: 12/23/2020: standing VOR x1 against busy background  Charges: NMRE x 1, there act X 2   Total Timed Treatment: 45 min  Total Treatment Time: 45 min

## 2021-01-07 ENCOUNTER — TELEPHONE (OUTPATIENT)
Dept: PHYSICAL THERAPY | Facility: HOSPITAL | Age: 56
End: 2021-01-07

## 2021-01-07 ENCOUNTER — OFFICE VISIT (OUTPATIENT)
Dept: PHYSICAL THERAPY | Facility: HOSPITAL | Age: 56
End: 2021-01-07
Attending: OTOLARYNGOLOGY
Payer: MEDICAID

## 2021-01-07 PROCEDURE — 97112 NEUROMUSCULAR REEDUCATION: CPT

## 2021-01-07 NOTE — PROGRESS NOTES
Date  1/7/21           Visit Number  10/12           Banner x           Ther EX            Ther Act            Gait Training            CRM             Manual              Dx: L vestibular neuritis         Insurance:  [facility]    Visit # authorized:  TBD= with symptoms and condition being consistent with PPPD description. Issued written HEP to address impairments. Assessment: Pt. Has improved in PT, but still has significant symptoms and functional impairment related to dizziness and imbalance.   Pt's s

## 2021-01-07 NOTE — PATIENT INSTRUCTIONS
Irma[facility] Exercises    1. Stand and hold both clasped hands in front of eyes, thumb up. Turn hands, shoulders and head together left and right, while you focus on your thumbnail. Do 10 turns, then repeat going up and down.       2.  Stand next to a co

## 2021-01-10 DIAGNOSIS — F06.30 MOOD DISORDER IN CONDITIONS CLASSIFIED ELSEWHERE: ICD-10-CM

## 2021-01-11 ENCOUNTER — TELEPHONE (OUTPATIENT)
Dept: INTERNAL MEDICINE CLINIC | Facility: CLINIC | Age: 56
End: 2021-01-11

## 2021-01-11 RX ORDER — BUPROPION HYDROCHLORIDE 150 MG/1
TABLET, EXTENDED RELEASE ORAL
Qty: 90 TABLET | Refills: 0 | Status: SHIPPED | OUTPATIENT
Start: 2021-01-11 | End: 2021-05-03

## 2021-01-11 RX ORDER — ALPRAZOLAM 0.25 MG/1
TABLET ORAL
Qty: 14 TABLET | Refills: 0 | Status: SHIPPED | OUTPATIENT
Start: 2021-01-11

## 2021-01-11 NOTE — TELEPHONE ENCOUNTER
Pt was supposed to get the fecal occult cards in the mail and she never got them-please mail her new one

## 2021-01-11 NOTE — TELEPHONE ENCOUNTER
Last VISIT 09/30/20    Last REFILL 07/22/20 Alprazolam qty 14 w/0 refills, Bupropion qty 90 w/0 refills    Last LABS 11/12/20 CBC, CMP, Lipid, TSH, Vit D done    No Future Appointments      Per PROTOCOL? Not on protocol      Please Approve or Deny.

## 2021-01-12 ENCOUNTER — APPOINTMENT (OUTPATIENT)
Dept: PHYSICAL THERAPY | Facility: HOSPITAL | Age: 56
End: 2021-01-12
Attending: OTOLARYNGOLOGY
Payer: MEDICAID

## 2021-01-14 ENCOUNTER — OFFICE VISIT (OUTPATIENT)
Dept: INTERNAL MEDICINE CLINIC | Facility: CLINIC | Age: 56
End: 2021-01-14
Payer: MEDICAID

## 2021-01-14 VITALS
SYSTOLIC BLOOD PRESSURE: 96 MMHG | TEMPERATURE: 100 F | HEART RATE: 88 BPM | DIASTOLIC BLOOD PRESSURE: 64 MMHG | HEIGHT: 58.27 IN | BODY MASS INDEX: 24.43 KG/M2 | WEIGHT: 118 LBS

## 2021-01-14 DIAGNOSIS — S29.019A THORACIC MYOFASCIAL STRAIN, INITIAL ENCOUNTER: Primary | ICD-10-CM

## 2021-01-14 DIAGNOSIS — D17.1 LIPOMA OF BACK: ICD-10-CM

## 2021-01-14 PROCEDURE — 3078F DIAST BP <80 MM HG: CPT | Performed by: FAMILY MEDICINE

## 2021-01-14 PROCEDURE — 3074F SYST BP LT 130 MM HG: CPT | Performed by: FAMILY MEDICINE

## 2021-01-14 PROCEDURE — 3008F BODY MASS INDEX DOCD: CPT | Performed by: FAMILY MEDICINE

## 2021-01-14 PROCEDURE — 99213 OFFICE O/P EST LOW 20 MIN: CPT | Performed by: FAMILY MEDICINE

## 2021-01-14 NOTE — PROGRESS NOTES
Clotilde Jeans  5/5/1965    Patient presents with:  Back Pain: AJ rm 9 middle back pain and lump in same area just notice today      HPI:   Clotilde Jeans is a 48year old female who presents for evaluation of low back pain that has been present since toda (attention deficit disorder)     RUQ abdominal pain     Encounter for screening colonoscopy     H. pylori infection     Gastritis     Benign colon polyp     Abnormal auditory perception, unspecified laterality     Dizziness     Vestibular neuritis, unspeci secondary to thoracic paraspinal strain along with evaluation of her newly discovered soft tissue mass that is consistent with a lipoma.   We discussed treatment of her thoracic strain with home exercises and stretching along with as needed Tylenol or anti-

## 2021-01-19 ENCOUNTER — APPOINTMENT (OUTPATIENT)
Dept: PHYSICAL THERAPY | Age: 56
End: 2021-01-19
Attending: OTOLARYNGOLOGY
Payer: MEDICAID

## 2021-01-20 ENCOUNTER — LAB ENCOUNTER (OUTPATIENT)
Dept: LAB | Age: 56
End: 2021-01-20
Attending: INTERNAL MEDICINE
Payer: MEDICAID

## 2021-01-20 DIAGNOSIS — Z12.11 SCREEN FOR COLON CANCER: ICD-10-CM

## 2021-01-20 PROCEDURE — 82274 ASSAY TEST FOR BLOOD FECAL: CPT

## 2021-01-21 ENCOUNTER — TELEPHONE (OUTPATIENT)
Dept: PHYSICAL THERAPY | Facility: HOSPITAL | Age: 56
End: 2021-01-21

## 2021-01-21 ENCOUNTER — OFFICE VISIT (OUTPATIENT)
Dept: INTERNAL MEDICINE CLINIC | Facility: CLINIC | Age: 56
End: 2021-01-21
Payer: MEDICAID

## 2021-01-21 VITALS
RESPIRATION RATE: 16 BRPM | SYSTOLIC BLOOD PRESSURE: 116 MMHG | WEIGHT: 119 LBS | HEIGHT: 58.27 IN | DIASTOLIC BLOOD PRESSURE: 72 MMHG | OXYGEN SATURATION: 98 % | TEMPERATURE: 97 F | BODY MASS INDEX: 24.64 KG/M2 | HEART RATE: 100 BPM

## 2021-01-21 DIAGNOSIS — R10.13 EPIGASTRIC PAIN: Primary | ICD-10-CM

## 2021-01-21 DIAGNOSIS — Z86.19 HISTORY OF HELICOBACTER PYLORI INFECTION: ICD-10-CM

## 2021-01-21 PROCEDURE — 99214 OFFICE O/P EST MOD 30 MIN: CPT | Performed by: FAMILY MEDICINE

## 2021-01-21 PROCEDURE — 3078F DIAST BP <80 MM HG: CPT | Performed by: FAMILY MEDICINE

## 2021-01-21 PROCEDURE — 3074F SYST BP LT 130 MM HG: CPT | Performed by: FAMILY MEDICINE

## 2021-01-21 PROCEDURE — 3008F BODY MASS INDEX DOCD: CPT | Performed by: FAMILY MEDICINE

## 2021-01-21 NOTE — PROGRESS NOTES
Jose Johns  5/5/1965    Patient presents with: Follow - Up: RG rm 8 acid reflux issues, thinks pains in upper back related.       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 She has h/o H. Pylori treated approximately 4 years ago that present with similar symptoms. Poor correlation with food intake as some meals worsen while others improve her symptoms.  DDX includes TULIO

## 2021-01-22 ENCOUNTER — TELEPHONE (OUTPATIENT)
Dept: PHYSICAL THERAPY | Facility: HOSPITAL | Age: 56
End: 2021-01-22

## 2021-01-22 ENCOUNTER — APPOINTMENT (OUTPATIENT)
Dept: PHYSICAL THERAPY | Facility: HOSPITAL | Age: 56
End: 2021-01-22
Attending: OTOLARYNGOLOGY
Payer: MEDICAID

## 2021-01-23 LAB — HEMOCCULT STL QL: NEGATIVE

## 2021-01-26 ENCOUNTER — OFFICE VISIT (OUTPATIENT)
Dept: PHYSICAL THERAPY | Age: 56
End: 2021-01-26
Attending: OTOLARYNGOLOGY
Payer: MEDICAID

## 2021-01-29 ENCOUNTER — APPOINTMENT (OUTPATIENT)
Dept: PHYSICAL THERAPY | Facility: HOSPITAL | Age: 56
End: 2021-01-29
Attending: OTOLARYNGOLOGY
Payer: MEDICAID

## 2021-02-03 ENCOUNTER — OFFICE VISIT (OUTPATIENT)
Dept: SURGERY | Facility: CLINIC | Age: 56
End: 2021-02-03
Payer: MEDICAID

## 2021-02-03 VITALS
WEIGHT: 120 LBS | TEMPERATURE: 97 F | SYSTOLIC BLOOD PRESSURE: 107 MMHG | HEART RATE: 90 BPM | HEIGHT: 58 IN | DIASTOLIC BLOOD PRESSURE: 78 MMHG | BODY MASS INDEX: 25.19 KG/M2

## 2021-02-03 DIAGNOSIS — Z86.010 HISTORY OF ADENOMATOUS POLYP OF COLON: Primary | ICD-10-CM

## 2021-02-03 DIAGNOSIS — Z86.19 HISTORY OF HELICOBACTER PYLORI INFECTION: ICD-10-CM

## 2021-02-03 DIAGNOSIS — Z01.818 PRE-OP TESTING: ICD-10-CM

## 2021-02-03 PROCEDURE — 3008F BODY MASS INDEX DOCD: CPT | Performed by: SURGERY

## 2021-02-03 PROCEDURE — 99243 OFF/OP CNSLTJ NEW/EST LOW 30: CPT | Performed by: SURGERY

## 2021-02-03 PROCEDURE — 3078F DIAST BP <80 MM HG: CPT | Performed by: SURGERY

## 2021-02-03 PROCEDURE — 3074F SYST BP LT 130 MM HG: CPT | Performed by: SURGERY

## 2021-02-03 RX ORDER — SODIUM, POTASSIUM,MAG SULFATES 17.5-3.13G
SOLUTION, RECONSTITUTED, ORAL ORAL
Qty: 1 KIT | Refills: 0 | Status: SHIPPED | OUTPATIENT
Start: 2021-02-03 | End: 2021-12-09 | Stop reason: ALTCHOICE

## 2021-02-03 NOTE — H&P
New Patient Visit Note       Active Problems      1. History of adenomatous polyp of colon    2. History of Helicobacter pylori infection    3.  Pre-op testing        Chief Complaint   Patient presents with:  Colonoscopy: Colonoscopy consult - Ref by: Stephen Donaldson D deficiency      Past Surgical History:   Procedure Laterality Date   • COLONOSCOPY  6/10/16   • D & C  1/1/99   • ENDOMETRIAL ABLATION     • TONSILLECTOMY         The family history and social history have been reviewed by me today.     Family History   P Rfl:       Review of Systems  The Review of Systems has been reviewed by me during today. Review of Systems   Constitutional: Negative for chills, diaphoresis, fatigue, fever and unexpected weight change.    HENT: Negative for hearing loss, nosebleeds, sor hepatomegaly. There is no abdominal tenderness. There is no rigidity, no rebound, no guarding, no CVA tenderness, no tenderness at McBurney's point and negative Solitario's sign.  Hernia confirmed negative in the ventral area, confirmed negative in the right i

## 2021-02-04 ENCOUNTER — HOSPITAL ENCOUNTER (OUTPATIENT)
Dept: MAMMOGRAPHY | Age: 56
Discharge: HOME | End: 2021-02-04
Attending: INTERNAL MEDICINE
Payer: MEDICAID

## 2021-02-04 ENCOUNTER — TELEPHONE (OUTPATIENT)
Dept: INTERNAL MEDICINE CLINIC | Facility: CLINIC | Age: 56
End: 2021-02-04

## 2021-02-04 ENCOUNTER — HOSPITAL ENCOUNTER (OUTPATIENT)
Dept: ULTRASOUND IMAGING | Age: 56
Discharge: HOME | End: 2021-02-04
Attending: FAMILY MEDICINE
Payer: MEDICAID

## 2021-02-04 DIAGNOSIS — Z12.31 ENCOUNTER FOR SCREENING MAMMOGRAM FOR MALIGNANT NEOPLASM OF BREAST: ICD-10-CM

## 2021-02-04 DIAGNOSIS — N20.0 NEPHROLITHIASIS: Primary | ICD-10-CM

## 2021-02-04 DIAGNOSIS — R10.13 EPIGASTRIC PAIN: ICD-10-CM

## 2021-02-04 DIAGNOSIS — D17.1 LIPOMA OF BACK: ICD-10-CM

## 2021-02-04 PROCEDURE — 77063 BREAST TOMOSYNTHESIS BI: CPT | Performed by: INTERNAL MEDICINE

## 2021-02-04 PROCEDURE — 76604 US EXAM CHEST: CPT | Performed by: FAMILY MEDICINE

## 2021-02-04 PROCEDURE — 76700 US EXAM ABDOM COMPLETE: CPT | Performed by: FAMILY MEDICINE

## 2021-02-04 PROCEDURE — 77067 SCR MAMMO BI INCL CAD: CPT | Performed by: INTERNAL MEDICINE

## 2021-02-04 NOTE — TELEPHONE ENCOUNTER
Discussed Abdominal US with patient over the phone. Found to have 8 mm renal stone with minimal to mild hydronephrosis. She does have mild intermittent colicky right sided back/flank pain, no dysuria or hematuria.  Discussed ED precautions and will refer to

## 2021-02-08 ENCOUNTER — TELEPHONE (OUTPATIENT)
Dept: SURGERY | Facility: CLINIC | Age: 56
End: 2021-02-08

## 2021-02-08 NOTE — TELEPHONE ENCOUNTER
Informed pt that all colon preps on back order. The only one available not covered by insurance. Information mailed to pt regarding gatorade/miralax prep. Pt VU.

## 2021-02-15 ENCOUNTER — OFFICE VISIT (OUTPATIENT)
Dept: SURGERY | Facility: CLINIC | Age: 56
End: 2021-02-15
Payer: MEDICAID

## 2021-02-15 VITALS — DIASTOLIC BLOOD PRESSURE: 62 MMHG | SYSTOLIC BLOOD PRESSURE: 102 MMHG

## 2021-02-15 DIAGNOSIS — N20.0 KIDNEY STONE: ICD-10-CM

## 2021-02-15 DIAGNOSIS — R82.90 URINE FINDING: Primary | ICD-10-CM

## 2021-02-15 LAB
APPEARANCE: CLEAR
MULTISTIX LOT#: 5077 NUMERIC
PH, URINE: 6.5 (ref 4.5–8)
SPECIFIC GRAVITY: 1.01 (ref 1–1.03)
UROBILINOGEN,SEMI-QN: 0.2 MG/DL (ref 0–1.9)

## 2021-02-15 PROCEDURE — 3078F DIAST BP <80 MM HG: CPT | Performed by: UROLOGY

## 2021-02-15 PROCEDURE — 99243 OFF/OP CNSLTJ NEW/EST LOW 30: CPT | Performed by: UROLOGY

## 2021-02-15 PROCEDURE — 81003 URINALYSIS AUTO W/O SCOPE: CPT | Performed by: UROLOGY

## 2021-02-15 PROCEDURE — 3074F SYST BP LT 130 MM HG: CPT | Performed by: UROLOGY

## 2021-02-15 RX ORDER — TAMSULOSIN HYDROCHLORIDE 0.4 MG/1
0.4 CAPSULE ORAL DAILY
Qty: 30 CAPSULE | Refills: 0 | Status: SHIPPED | OUTPATIENT
Start: 2021-02-15 | End: 2021-03-17

## 2021-02-15 RX ORDER — ACETAMINOPHEN AND CODEINE PHOSPHATE 300; 30 MG/1; MG/1
1 TABLET ORAL EVERY 4 HOURS PRN
Qty: 20 TABLET | Refills: 0 | Status: SHIPPED | OUTPATIENT
Start: 2021-02-15 | End: 2021-12-09 | Stop reason: ALTCHOICE

## 2021-02-15 NOTE — PATIENT INSTRUCTIONS
On behalf of myself and care team, I would like to thank you for entrusting us with your care today. It is our goal to provide you and your family with excellent care.   Please note that you may receive a survey in the mail; any feedback you have for this or stuffy nose  · trouble sleeping  What may interact with this medicine?   · cimetidine  · fluoxetine  · ketoconazole  · medicines for erectile disfunction like sildenafil, tadalafil, vardenafil  · medicines for high blood pressure  · other alpha-blockers how this medicine affects you. Do not sit or stand up quickly. If you begin to feel dizzy, sit down until you feel better. These effects can decrease once your body adjusts to the medicine.   Contact your doctor or health care professional right away if you twice so your healthcare provider can compare the results. What other tests might I have along with this test?  Your healthcare provider may also order imaging tests.  These include an ultrasound, CT scan and, a special type of X-ray (pyelogram) that uses includes medicines that don't need a prescription and any illegal drugs you may use.    Marlene last reviewed this educational content on 8/1/2020  © 3070-2174 [employer]. All rights reserved.  This information is not intended as a substitut

## 2021-02-15 NOTE — PROGRESS NOTES
Rooming Clinician:     Oneida Barker is a 48year old female.   Patient presents with:  Consult: c/o kidney stone  Kidney Stones / Ureteral Stone  Pain: No   right  Nausea: No    Vomiting: No    Previous Stones: No    Chemical Analysis: no  Medical Evalu pancreas was not well seen due to overlying bowel gas. This could also be better assessed with CT if clinically indicated.            Dictated by (CST): Taz Sosa MD on 2/04/2021 at 10:21 AM         Previous Medications for Stones: no      HPI:     Had so site    • Allergic rhinitis, cause unspecified    • Carpal tunnel syndrome    • Other B-complex deficiencies    • Unspecified vitamin D deficiency      Past Surgical History:   Procedure Laterality Date   • COLONOSCOPY  6/10/16   • D & C  1/1/99   • ENDOME (CPT=76700)  COMPARISON:  US FIGUEROA, US ABDOMEN COMPLETE (CPT=76700), 4/01/2016, 5:11 PM.  INDICATIONS:  R10.13 Epigastric pain  TECHNIQUE:  Real time gray-scale ultrasound was used to evaluate the abdomen.   The exam includes images of the liver, gal COMPARISON:  [facility] PALAK, [facility] VICENTA 2D+3D SCRN [facility] W/CAD BILAT (CPT=/01895/14006), 10/30/2013, 10:33 AM.  [facility] PALAK, DIGITAL SCREENING [facility] W/ CAD, 9/15/2009, 11:44 AM.  MG Estela, [facility] SCREENING BILAT (CPT=77067), 2/27/2018, 8:47 AM.  Jackson Crawford may represent a lesion such as a lipoma. No increased vascularity is seen. CONCLUSION:  Rounded area of echogenicity in the area of concern without internal vascularity. This may represent a lesion such as a lipoma.   If there is persistent cli

## 2021-02-17 DIAGNOSIS — Z01.818 PRE-OP TESTING: Primary | ICD-10-CM

## 2021-02-24 ENCOUNTER — TELEPHONE (OUTPATIENT)
Dept: SURGERY | Facility: CLINIC | Age: 56
End: 2021-02-24

## 2021-02-24 DIAGNOSIS — N20.0 KIDNEY STONES: Primary | ICD-10-CM

## 2021-02-24 DIAGNOSIS — N20.0 RENAL CALCULUS, RIGHT: ICD-10-CM

## 2021-02-24 NOTE — TELEPHONE ENCOUNTER
Good Morning    I received a denial request by fax for referral [id number] cpt [id number]    I contact [facility] based on clinicals I submitted. - spoke with Xiomara GUILLERMO([employer] rep) they recommended alternative [id number] can be approved . [facility]     Please advise    See denial b

## 2021-02-25 NOTE — TELEPHONE ENCOUNTER
Please see below denial and advise do you still want to try alternative as indicated in my message 2/24/2021 -[facility] [id number]?     [address] [facility]

## 2021-02-26 NOTE — TELEPHONE ENCOUNTER
RN replied to Blake Mackay regarding her inquiry: \"Please see below denial and advise do you still want to try alternative as indicated in my message 2/24/2021 -Cpt [phone]? \"    Per letter from [facility], \"Guideines support a CT scan of the abdomen and pe

## 2021-02-26 NOTE — TELEPHONE ENCOUNTER
Called patient and informed her of denial she verbally understood and will contact Dr. Alton Maloney office next week if do not hear from them    Thanks    [address] [facility]

## 2021-03-04 NOTE — TELEPHONE ENCOUNTER
RN called patient to relay that CT Abd (CPT 05017) was approved. RN called this patient twice and it went to the automated line, but no answer. Unable to leave message.

## 2021-03-11 DIAGNOSIS — Z01.818 PRE-OP TESTING: Primary | ICD-10-CM

## 2021-03-12 ENCOUNTER — HOSPITAL ENCOUNTER (OUTPATIENT)
Dept: CT IMAGING | Age: 56
Discharge: HOME | End: 2021-03-12
Attending: UROLOGY
Payer: MEDICAID

## 2021-03-12 DIAGNOSIS — N20.0 RENAL CALCULUS, RIGHT: ICD-10-CM

## 2021-03-12 DIAGNOSIS — N20.0 KIDNEY STONES: ICD-10-CM

## 2021-03-12 LAB — CREAT BLD-MCNC: 0.8 MG/DL

## 2021-03-12 PROCEDURE — 82565 ASSAY OF CREATININE: CPT

## 2021-03-12 PROCEDURE — 74178 CT ABD&PLV WO CNTR FLWD CNTR: CPT | Performed by: UROLOGY

## 2021-03-23 RX ORDER — TAMSULOSIN HYDROCHLORIDE 0.4 MG/1
CAPSULE ORAL
Qty: 30 CAPSULE | Refills: 0 | OUTPATIENT
Start: 2021-03-23

## 2021-03-23 NOTE — TELEPHONE ENCOUNTER
MD Ck Hunt RN 16 hours ago (5:37 PM)     CT scan shows no evidence of ureteral calculus.  Therefore Flomax does not need to be refilled.     Message text

## 2021-04-08 ENCOUNTER — TELEPHONE (OUTPATIENT)
Dept: PHYSICAL THERAPY | Age: 56
End: 2021-04-08

## 2021-04-09 ENCOUNTER — OFFICE VISIT (OUTPATIENT)
Dept: PHYSICAL THERAPY | Facility: HOSPITAL | Age: 56
End: 2021-04-09
Attending: INTERNAL MEDICINE
Payer: MEDICAID

## 2021-04-09 DIAGNOSIS — R42 DIZZINESS: ICD-10-CM

## 2021-04-09 PROCEDURE — 97162 PT EVAL MOD COMPLEX 30 MIN: CPT

## 2021-04-09 PROCEDURE — 97112 NEUROMUSCULAR REEDUCATION: CPT

## 2021-04-09 NOTE — PATIENT INSTRUCTIONS
[facility] Exercises    1. Stand and hold both clasped hands in front of eyes, thumb up. Turn hands, shoulders and head together left and right, while you focus on your thumbnail. Do 10 turns, then repeat with the other thumb.       2. Grounding:  Shanelle Face

## 2021-04-09 NOTE — PROGRESS NOTES
VESTIBULAR EVALUATION:   Referring Physician: Dr. Rizwana Kent  Diagnosis: L vestibular neuritis, PPPD     Date of Service: 4/9/2021   Insurance:  [facility]      PATIENT SUMMARY   Jammie Vital is a 48year old female who presents to therapy tod with Jen Ibarra. Significant findings include Carpal tunnel syndrome, vitamin D deficiency, kidney stones, H pylori infection. ASSESSMENT  Jen Ibarra presents to physical therapy evaluation with primary c/o dizziness and imbalance that is limiting function.  Holden Arnold challenge  SLS: R EO 5 sec, EC 0 sec; L EO 5 sec, EC 0 sec     Functional Mobility:   Gait: pt ambulates on level ground with decreased arm swing and path deviation with visual scanning.    Functional Gait Assessment   Item Description Score (0 worst, 3 bes for household chores and gardening. 3. Indep on stairs with reciprocal pattern, carrying 15 lbs, in order to improve functional mobility for household chores. 4.  Indep climbing up/down 6 ft ladder without dizziness.   5.  Indep gait on even and uneven willis

## 2021-04-12 DIAGNOSIS — Z23 NEED FOR VACCINATION: ICD-10-CM

## 2021-04-13 ENCOUNTER — LAB ENCOUNTER (OUTPATIENT)
Dept: LAB | Age: 56
End: 2021-04-13
Attending: SURGERY
Payer: MEDICAID

## 2021-04-13 DIAGNOSIS — Z01.818 PRE-OP TESTING: ICD-10-CM

## 2021-04-16 ENCOUNTER — LAB REQUISITION (OUTPATIENT)
Dept: LAB | Facility: HOSPITAL | Age: 56
End: 2021-04-16
Payer: MEDICAID

## 2021-04-16 PROCEDURE — 87081 CULTURE SCREEN ONLY: CPT | Performed by: SURGERY

## 2021-04-21 ENCOUNTER — OFFICE VISIT (OUTPATIENT)
Dept: PHYSICAL THERAPY | Facility: HOSPITAL | Age: 56
End: 2021-04-21
Attending: INTERNAL MEDICINE
Payer: MEDICAID

## 2021-04-21 PROCEDURE — 97112 NEUROMUSCULAR REEDUCATION: CPT

## 2021-04-21 NOTE — PROGRESS NOTES
Date  4/21/21           Visit Number 2/8           [facility] x           Ther EX            Ther Act            Gait Training            CRM             Manual              Dx: L vestibular neuritis, PPPD       Insurance:  [employer]    Visit # a order to return to community gait and walking for exercise. Plan: stairs, ladder.     Charges: NMR x 3       Total Timed Treatment: 40 min  Total Treatment Time: 40 min

## 2021-04-21 NOTE — PATIENT INSTRUCTIONS
[facility] Exercises    1. Stand feet close together. Hold a fan in one hand, move the fan in a diagonal from left hip up to right overhead. Watch the fan with head and eyes.   Do 10 times, then go in the opposite diagonal from right hip to left overhe

## 2021-04-26 ENCOUNTER — PATIENT OUTREACH (OUTPATIENT)
Dept: SURGERY | Facility: CLINIC | Age: 56
End: 2021-04-26

## 2021-04-28 ENCOUNTER — TELEPHONE (OUTPATIENT)
Dept: PHYSICAL THERAPY | Age: 56
End: 2021-04-28

## 2021-04-28 ENCOUNTER — APPOINTMENT (OUTPATIENT)
Dept: PHYSICAL THERAPY | Facility: HOSPITAL | Age: 56
End: 2021-04-28
Attending: INTERNAL MEDICINE
Payer: MEDICAID

## 2021-04-29 ENCOUNTER — OFFICE VISIT (OUTPATIENT)
Dept: PHYSICAL THERAPY | Facility: HOSPITAL | Age: 56
End: 2021-04-29
Attending: INTERNAL MEDICINE
Payer: MEDICAID

## 2021-04-29 PROCEDURE — 97112 NEUROMUSCULAR REEDUCATION: CPT

## 2021-04-29 NOTE — PATIENT INSTRUCTIONS
[facility] Exercises    1. Grounding:  Stand next to a countertop or with your bed behind you for safety. Stand tall and place feet next to each other so that they are touching.   Arms at sides, grow as tall as you can, keep core strong, close your eyes close together, racquet right in front of you. Rotate left and then center and then right, repeat 10-15 times ea way, keeping your eyes on the racquet.

## 2021-04-29 NOTE — PROGRESS NOTES
Date  4/21/21 4/29/21          Visit Number 2/8 3/8          NMR x x          Ther EX            Ther Act            Gait Training            CRM             Manual              Dx: L vestibular neuritis, PPPD       Insurance:  [employer] [facility] Total Timed Treatment: 40 min  Total Treatment Time: 40 min

## 2021-05-04 RX ORDER — BUPROPION HYDROCHLORIDE 150 MG/1
150 TABLET, EXTENDED RELEASE ORAL DAILY
Qty: 90 TABLET | Refills: 0 | Status: SHIPPED | OUTPATIENT
Start: 2021-05-04 | End: 2021-08-23

## 2021-05-04 NOTE — TELEPHONE ENCOUNTER
Last VISIT 01/21/21    Last REFILL 01/11/21 qty 90 w/0 refills    Last LABS 04/13/21 Covid test done    No Future Appointments    Per PROTOCOL? Not on protocol      Please Approve or Deny.

## 2021-05-13 ENCOUNTER — OFFICE VISIT (OUTPATIENT)
Dept: PHYSICAL THERAPY | Facility: HOSPITAL | Age: 56
End: 2021-05-13
Attending: INTERNAL MEDICINE
Payer: MEDICAID

## 2021-05-13 PROCEDURE — 97112 NEUROMUSCULAR REEDUCATION: CPT

## 2021-05-13 NOTE — PROGRESS NOTES
Date  4/21/21 4/29/21 5/13/21         Visit Number 2/8 3/8 4/8         NMR x x x         Ther EX            Ther Act            Gait Training            CRM             Manual              Dx: L vestibular neuritis, PPPD       Insurance:  [employer] to community gait and walking for exercise.     Plan: Eyes closed walking, dynamic stepping    Charges: NMR x 3       Total Timed Treatment: 40 min  Total Treatment Time: 40 min

## 2021-05-13 NOTE — PATIENT INSTRUCTIONS
[facility] Exercises    1. Grounding:  Stand next to a countertop or with your bed behind you for safety. Stand tall and place feet next to each other so that they are touching.   Arms at sides, grow as tall as you can, keep core strong, close your eyes

## 2021-05-20 ENCOUNTER — OFFICE VISIT (OUTPATIENT)
Dept: PHYSICAL THERAPY | Facility: HOSPITAL | Age: 56
End: 2021-05-20
Attending: INTERNAL MEDICINE
Payer: MEDICAID

## 2021-05-20 PROCEDURE — 97112 NEUROMUSCULAR REEDUCATION: CPT

## 2021-05-20 NOTE — PROGRESS NOTES
Date  4/21/21 4/29/21 5/13/21 5/20/21        Visit Number 2/8 3/8 4/8 5/8        NMR x x x x        Ther EX            Ther Act            Gait Training            CRM             Manual              Dx: L vestibular neuritis, PPPD       Insurance: gardening. 3. Indep on stairs with reciprocal pattern, carrying 15 lbs, in order to improve functional mobility for household chores. 4.  Indep climbing up/down 6 ft ladder without dizziness.   5.  Indep gait on even and uneven surfaces including hills fo

## 2021-06-03 ENCOUNTER — OFFICE VISIT (OUTPATIENT)
Dept: PHYSICAL THERAPY | Facility: HOSPITAL | Age: 56
End: 2021-06-03
Attending: OTOLARYNGOLOGY
Payer: MEDICAID

## 2021-06-03 PROCEDURE — 97112 NEUROMUSCULAR REEDUCATION: CPT

## 2021-06-03 NOTE — PROGRESS NOTES
Date  4/21/21 4/29/21 5/13/21 5/20/21 6/3/21       Visit Number 2/8 3/8 4/8 5/8 6/8       NMR x x x x x       Ther EX            Ther Act            Gait Training            CRM             Manual              Dx: L vestibular neuritis, PPPD       In mobility for household chores. 4.  Indep climbing up/down 6 ft ladder without dizziness. 5.  Indep gait on even and uneven surfaces including hills for up to 30 min in order to return to community gait and walking for exercise.     Plan:  Work on rapid mo

## 2021-06-10 ENCOUNTER — OFFICE VISIT (OUTPATIENT)
Dept: PHYSICAL THERAPY | Facility: HOSPITAL | Age: 56
End: 2021-06-10
Attending: OTOLARYNGOLOGY
Payer: MEDICAID

## 2021-06-10 PROCEDURE — 97112 NEUROMUSCULAR REEDUCATION: CPT

## 2021-06-10 NOTE — PROGRESS NOTES
Date  4/21/21 4/29/21 5/13/21 5/20/21 6/3/21 6/10/21      Visit Number 2/8 3/8 4/8 5/8 6/8 7/8      NMR x x x x x x      Ther EX            Ther Act            Gait Training            CRM             Manual              Dx: L vestibular neuritis, PP household chores and gardening. 3. Indep on stairs with reciprocal pattern, carrying 15 lbs, in order to improve functional mobility for household chores. 4.  Indep climbing up/down 6 ft ladder without dizziness.   5.  Indep gait on even and uneven surfac

## 2021-06-17 ENCOUNTER — OFFICE VISIT (OUTPATIENT)
Dept: PHYSICAL THERAPY | Facility: HOSPITAL | Age: 56
End: 2021-06-17
Attending: OTOLARYNGOLOGY
Payer: MEDICAID

## 2021-06-17 PROCEDURE — 97116 GAIT TRAINING THERAPY: CPT

## 2021-06-17 PROCEDURE — 97112 NEUROMUSCULAR REEDUCATION: CPT

## 2021-06-17 NOTE — PROGRESS NOTES
Patient Name: Constantine Luz, : 1965, MRN: [id number]   Date:  2021  Referring Physician:  Stacey Ryder    Diagnosis: Left Vestibular neuritis/PPPD    Discharge Summary  Pt has attended 8 visits in physical therapy.     Progress Note Sta Stand Test: 7 sec (was 8 sec)  Stairs: reciprocal pattern, no railing, still prefers railing at times      Goals    1. Improved Functional Gait Assessment score to at least 27/30 to reflect an improvement in gait and functional balance. (GOAL MET)  2.   P

## 2021-08-19 ENCOUNTER — PATIENT MESSAGE (OUTPATIENT)
Dept: INTERNAL MEDICINE CLINIC | Facility: CLINIC | Age: 56
End: 2021-08-19

## 2021-08-19 NOTE — TELEPHONE ENCOUNTER
From: Korey Wilson  To:  Vern Arroyo MD  Sent: 8/19/2021 9:59 AM CDT  Subject: Prescription Question    Hi Dr Alec Smallwood, I   My prescription for Bupropion needs to be refilled and the pharmacy said they reached out to your office and that I should also since

## 2021-08-23 RX ORDER — BUPROPION HYDROCHLORIDE 150 MG/1
150 TABLET, EXTENDED RELEASE ORAL DAILY
Qty: 90 TABLET | Refills: 0 | Status: SHIPPED | OUTPATIENT
Start: 2021-08-23 | End: 2021-11-19

## 2021-08-23 NOTE — TELEPHONE ENCOUNTER
Last VISIT 1/21/21    Last CPE 09/30/20    Last REFILL 05/04/21 qty 90 w/0 refills    Last LABS 04/13/21 Covid test done    No future appointments. Please Approve or Deny.

## 2021-10-26 ENCOUNTER — TELEPHONE (OUTPATIENT)
Dept: INTERNAL MEDICINE CLINIC | Facility: CLINIC | Age: 56
End: 2021-10-26

## 2021-10-26 DIAGNOSIS — Z13.228 SCREENING FOR METABOLIC DISORDER: ICD-10-CM

## 2021-10-26 DIAGNOSIS — Z13.220 SCREENING FOR LIPID DISORDERS: ICD-10-CM

## 2021-10-26 DIAGNOSIS — Z00.00 ROUTINE GENERAL MEDICAL EXAMINATION AT A HEALTH CARE FACILITY: Primary | ICD-10-CM

## 2021-10-26 DIAGNOSIS — Z13.0 SCREENING FOR BLOOD DISEASE: ICD-10-CM

## 2021-10-26 DIAGNOSIS — Z13.29 SCREENING FOR THYROID DISORDER: ICD-10-CM

## 2021-10-26 NOTE — TELEPHONE ENCOUNTER
Orders to [facility]  Pt aware to get labs done no sooner than 2 weeks prior to the appt. Pt aware to fast.  No call back required.   Future Appointments   Date Time Provider Fiordaliza Mckenna   12/9/2021  8:40 AM Swetha Castellon MD [facility]

## 2021-11-19 RX ORDER — BUPROPION HYDROCHLORIDE 150 MG/1
TABLET, EXTENDED RELEASE ORAL
Qty: 90 TABLET | Refills: 0 | Status: SHIPPED | OUTPATIENT
Start: 2021-11-19

## 2021-11-19 NOTE — TELEPHONE ENCOUNTER
Last OV  1/21/21  Last PE 9/30/20  Last REFILL   Medication Quantity Refills Start End   buPROPion HCl ER, SR, 150 MG Oral Tablet 12 Hr 90 tablet 0 8/23/2021      Last LABS 11/12/20 cbc, cmp, lipid, tsh    Future Appointments   Date Time Provider Radha Ayala

## 2021-12-09 ENCOUNTER — HOSPITAL ENCOUNTER (OUTPATIENT)
Dept: GENERAL RADIOLOGY | Age: 56
Discharge: HOME | End: 2021-12-09
Attending: INTERNAL MEDICINE
Payer: MEDICAID

## 2021-12-09 ENCOUNTER — OFFICE VISIT (OUTPATIENT)
Dept: INTERNAL MEDICINE CLINIC | Facility: CLINIC | Age: 56
End: 2021-12-09
Payer: MEDICAID

## 2021-12-09 VITALS
RESPIRATION RATE: 16 BRPM | HEIGHT: 58.27 IN | TEMPERATURE: 97 F | OXYGEN SATURATION: 98 % | HEART RATE: 76 BPM | WEIGHT: 125 LBS | DIASTOLIC BLOOD PRESSURE: 60 MMHG | SYSTOLIC BLOOD PRESSURE: 118 MMHG | BODY MASS INDEX: 25.89 KG/M2

## 2021-12-09 DIAGNOSIS — F17.200 TOBACCO DEPENDENCE: ICD-10-CM

## 2021-12-09 DIAGNOSIS — Z00.00 ANNUAL PHYSICAL EXAM: Primary | ICD-10-CM

## 2021-12-09 DIAGNOSIS — R20.0 NUMBNESS AND TINGLING IN LEFT HAND: ICD-10-CM

## 2021-12-09 DIAGNOSIS — M79.672 LEFT FOOT PAIN: ICD-10-CM

## 2021-12-09 DIAGNOSIS — F41.8 DEPRESSION WITH ANXIETY: ICD-10-CM

## 2021-12-09 DIAGNOSIS — F90.9 ATTENTION DEFICIT HYPERACTIVITY DISORDER (ADHD), UNSPECIFIED ADHD TYPE: ICD-10-CM

## 2021-12-09 DIAGNOSIS — R45.89 DEPRESSED MOOD: ICD-10-CM

## 2021-12-09 DIAGNOSIS — H81.20 VESTIBULAR NEURITIS, UNSPECIFIED LATERALITY: ICD-10-CM

## 2021-12-09 DIAGNOSIS — R20.2 NUMBNESS AND TINGLING IN LEFT HAND: ICD-10-CM

## 2021-12-09 DIAGNOSIS — R06.81 WITNESSED APNEIC SPELLS: ICD-10-CM

## 2021-12-09 DIAGNOSIS — D17.1 LIPOMA OF BACK: ICD-10-CM

## 2021-12-09 DIAGNOSIS — E78.5 DYSLIPIDEMIA: ICD-10-CM

## 2021-12-09 DIAGNOSIS — F98.8 ATTENTION DEFICIT DISORDER, UNSPECIFIED HYPERACTIVITY PRESENCE: ICD-10-CM

## 2021-12-09 PROBLEM — R42 DIZZINESS: Status: RESOLVED | Noted: 2020-09-21 | Resolved: 2021-12-09

## 2021-12-09 PROBLEM — H93.299 ABNORMAL AUDITORY PERCEPTION, UNSPECIFIED LATERALITY: Status: RESOLVED | Noted: 2020-09-21 | Resolved: 2021-12-09

## 2021-12-09 PROCEDURE — 73620 X-RAY EXAM OF FOOT: CPT | Performed by: INTERNAL MEDICINE

## 2021-12-09 PROCEDURE — 3008F BODY MASS INDEX DOCD: CPT | Performed by: INTERNAL MEDICINE

## 2021-12-09 PROCEDURE — 3074F SYST BP LT 130 MM HG: CPT | Performed by: INTERNAL MEDICINE

## 2021-12-09 PROCEDURE — 3078F DIAST BP <80 MM HG: CPT | Performed by: INTERNAL MEDICINE

## 2021-12-09 PROCEDURE — 99396 PREV VISIT EST AGE 40-64: CPT | Performed by: INTERNAL MEDICINE

## 2021-12-09 RX ORDER — DEXTROAMPHETAMINE SACCHARATE, AMPHETAMINE ASPARTATE MONOHYDRATE, DEXTROAMPHETAMINE SULFATE AND AMPHETAMINE SULFATE 2.5; 2.5; 2.5; 2.5 MG/1; MG/1; MG/1; MG/1
10 CAPSULE, EXTENDED RELEASE ORAL EVERY MORNING
Qty: 30 CAPSULE | Refills: 0 | Status: SHIPPED | OUTPATIENT
Start: 2021-12-09 | End: 2022-01-08

## 2021-12-09 NOTE — PROGRESS NOTES
Jinx Frankel  5/5/1965    Patient presents with:  Physical: RG rm 1 CPE      HPI:   Jinx Frankel is a 55year old female who presents for an annual physical examination. The patient has overall maintained her usual state of health.   However, she michael Oral Tab Take 1 tablet by mouth. • [id number] OR Take 1 tablet by mouth.  Patient is taking 1,000 mg but only 500 was populating in system (Patient not taking: Reported on 12/9/2021)          Flagyl [Metronidazo*    HIVES, SWELLING   Past Medical Histor congested  LUNGS: denies shortness of breath with exertion  CARDIOVASCULAR: denies chest pain on exertion  GI: no nausea or abdominal pain  NEURO: denies headaches    EXAM:   /60   Pulse 76   Temp 97 °F (36.1 °C)   Resp 16   Ht 4' 10.27\" (1.48 m) plan.    TODAY'S ORDERS     Orders Placed This Encounter      Lipid Panel      Meds & Refills:  Requested Prescriptions     Signed Prescriptions Disp Refills   • Amphetamine-Dextroamphet ER (ADDERALL XR) 10 MG Oral Capsule SR 24 Hr 30 capsule 0     Sig: Ta

## 2021-12-13 ENCOUNTER — TELEPHONE (OUTPATIENT)
Dept: ORTHOPEDICS CLINIC | Facility: CLINIC | Age: 56
End: 2021-12-13

## 2021-12-13 DIAGNOSIS — M79.671 RIGHT FOOT PAIN: Primary | ICD-10-CM

## 2021-12-13 NOTE — TELEPHONE ENCOUNTER
12/9/21 PROCEDURE:  XR FOOT WEIGHTBEARING (2 VIEWS), RIGHT     CONCLUSION:  No acute fractures.  Osteoarthritic changes greatest at the 1st MTP joint.

## 2021-12-13 NOTE — TELEPHONE ENCOUNTER
Patient is scheduled with Dr. Ann Marie Tidwell for possible with carpal tunnel in the left wrist. Please advise if imaging is needed.

## 2021-12-15 NOTE — TELEPHONE ENCOUNTER
India Vital., RICHARD      Those xrays will be fine      Future Appointments   Date Time Provider Fiordaliza Andres   1/4/2022  9:00 AM Marin Turner MD [facility]   1/12/2022 10:00 AM India Vital., RICHARD [facility]

## 2021-12-27 ENCOUNTER — PATIENT MESSAGE (OUTPATIENT)
Dept: INTERNAL MEDICINE CLINIC | Facility: CLINIC | Age: 56
End: 2021-12-27

## 2021-12-27 NOTE — TELEPHONE ENCOUNTER
From: Jammie Vital  To: Jaswinder Snowden MD  Sent: 12/27/2021 12:14 PM CST  Subject: Adderall prescription     Hi Dr Rossi singh [place] were great. Looks like my insurance isn't approving the Adderall. Any chance you can call them to push it through?  Eileen Iyer

## 2021-12-30 ENCOUNTER — TELEMEDICINE (OUTPATIENT)
Dept: INTERNAL MEDICINE CLINIC | Facility: CLINIC | Age: 56
End: 2021-12-30
Payer: MEDICAID

## 2021-12-30 ENCOUNTER — TELEPHONE (OUTPATIENT)
Dept: INTERNAL MEDICINE CLINIC | Facility: CLINIC | Age: 56
End: 2021-12-30

## 2021-12-30 DIAGNOSIS — J01.90 ACUTE RHINOSINUSITIS: Primary | ICD-10-CM

## 2021-12-30 PROCEDURE — 99213 OFFICE O/P EST LOW 20 MIN: CPT | Performed by: INTERNAL MEDICINE

## 2021-12-30 RX ORDER — AMOXICILLIN AND CLAVULANATE POTASSIUM 875; 125 MG/1; MG/1
1 TABLET, FILM COATED ORAL 2 TIMES DAILY
Qty: 20 TABLET | Refills: 0 | Status: SHIPPED | OUTPATIENT
Start: 2021-12-30 | End: 2022-01-09

## 2021-12-30 NOTE — PROGRESS NOTES
Roseann Ricks  5/5/1965    No chief complaint on file. Video encounter    SUBJECTIVE   Roseann Ricks is a 55year old female who presents with nasal and sinus congestion.     The patient was in her usual state of health until approximately 48 hours ago w • Allergic rhinitis, cause unspecified    • Carpal tunnel syndrome    • Other B-complex deficiencies    • Unspecified vitamin D deficiency       Patient Active Problem List:     Attention deficit hyperactivity disorder (ADHD)     Mood disorder in conditi for this visit. All questions were answered and the patient agrees with the plan.      Thank you,  Estella Uribe MD

## 2021-12-30 NOTE — TELEPHONE ENCOUNTER
What is the patient's vaccine status? Vaccinated,no booster  Is the patient symptomatic? Yes    If so, what are the symptoms and when did symptoms start? Cough,swollen glands, mucous,headache,no fever. This started Tuesday evening. When was the exposure? Not exposed that she is aware. Were one or both people involved unmasked for more than 15 minutes when exposed?   ??

## 2021-12-30 NOTE — TELEPHONE ENCOUNTER
Pt fully vaccinated no booster. Sx began 12/28 (cough, swollen glands, mucous, HA). LOV with AD 12/9/2021.      AD, would you like to add virtual or schedule VV with CB (has openings this evening)

## 2022-01-04 ENCOUNTER — OFFICE VISIT (OUTPATIENT)
Dept: ORTHOPEDICS CLINIC | Facility: CLINIC | Age: 57
End: 2022-01-04
Payer: MEDICAID

## 2022-01-04 VITALS — WEIGHT: 125 LBS | HEIGHT: 58 IN | BODY MASS INDEX: 26.24 KG/M2

## 2022-01-04 DIAGNOSIS — G56.02 CARPAL TUNNEL SYNDROME OF LEFT WRIST: Primary | ICD-10-CM

## 2022-01-04 PROCEDURE — 3008F BODY MASS INDEX DOCD: CPT | Performed by: ORTHOPAEDIC SURGERY

## 2022-01-04 PROCEDURE — 99203 OFFICE O/P NEW LOW 30 MIN: CPT | Performed by: ORTHOPAEDIC SURGERY

## 2022-01-04 NOTE — H&P
Clinic Note [facility]     Assessment/Plan:  55year old female    1. Left carpal tunnel syndrome–given numbness at baseline would recommend obtaining EMG/NCV. I did discuss the patient would likely benefit from carpal tunnel release.   She did well complicating pregnancy, childbirth, or the puerperium, unspecified as to episode of care    • Acute upper respiratory infections of unspecified site    • Allergic rhinitis, cause unspecified    • Carpal tunnel syndrome    • Other B-complex deficiencies pain, palpitations, leg swelling  Msk: bodyaches, neck pain, neck stiffness  Skin: rashes, open wounds, nonhealing ulcers  Hem: bleeds easily, bruise easily, immunocompromised  Neuro: dizziness, light headedness, headaches  Psych: anxious, depressed, anger

## 2022-01-07 ENCOUNTER — TELEPHONE (OUTPATIENT)
Dept: INTERNAL MEDICINE CLINIC | Facility: CLINIC | Age: 57
End: 2022-01-07

## 2022-01-12 ENCOUNTER — OFFICE VISIT (OUTPATIENT)
Dept: ORTHOPEDICS CLINIC | Facility: CLINIC | Age: 57
End: 2022-01-12
Payer: MEDICAID

## 2022-01-12 VITALS — WEIGHT: 122 LBS | HEIGHT: 59 IN | BODY MASS INDEX: 24.6 KG/M2

## 2022-01-12 DIAGNOSIS — F17.200 CURRENT SMOKER ON SOME DAYS: ICD-10-CM

## 2022-01-12 DIAGNOSIS — D36.10 NEUROMA: Primary | ICD-10-CM

## 2022-01-12 DIAGNOSIS — M20.41 HAMMER TOE OF RIGHT FOOT: ICD-10-CM

## 2022-01-12 PROCEDURE — 64450 NJX AA&/STRD OTHER PN/BRANCH: CPT | Performed by: PODIATRIST

## 2022-01-12 PROCEDURE — 3008F BODY MASS INDEX DOCD: CPT | Performed by: PODIATRIST

## 2022-01-12 PROCEDURE — 99203 OFFICE O/P NEW LOW 30 MIN: CPT | Performed by: PODIATRIST

## 2022-01-12 RX ORDER — BETAMETHASONE SODIUM PHOSPHATE AND BETAMETHASONE ACETATE 3; 3 MG/ML; MG/ML
12 INJECTION, SUSPENSION INTRA-ARTICULAR; INTRALESIONAL; INTRAMUSCULAR; SOFT TISSUE ONCE
Status: COMPLETED | OUTPATIENT
Start: 2022-01-12 | End: 2022-01-12

## 2022-01-12 RX ADMIN — BETAMETHASONE SODIUM PHOSPHATE AND BETAMETHASONE ACETATE 12 MG: 3; 3 INJECTION, SUSPENSION INTRA-ARTICULAR; INTRALESIONAL; INTRAMUSCULAR; SOFT TISSUE at 10:46:00

## 2022-01-12 NOTE — PROGRESS NOTES
[facility] New Patient Note    CC: Patient presents with: Foot Pain: right foot      HPI: The patient is a 55year old female who presents today with complaints of pain in foot getting worse over -3 years  No numbness or tingling  No injury. Sexual activity: Not on file       ROS:  Complete ROS reviewed by me and non-contributory to the chief complaint except as mentioned above.     Physical Exam:    Ht 4' 11\" (1.499 m)   Wt 122 lb (55.3 kg)   LMP  (LMP Unknown)   BMI 24.64 kg/m²       Exam ri

## 2022-01-26 ENCOUNTER — OFFICE VISIT (OUTPATIENT)
Dept: ORTHOPEDICS CLINIC | Facility: CLINIC | Age: 57
End: 2022-01-26
Payer: MEDICAID

## 2022-01-26 VITALS — HEART RATE: 61 BPM | OXYGEN SATURATION: 99 %

## 2022-01-26 DIAGNOSIS — F17.200 CURRENT SMOKER ON SOME DAYS: ICD-10-CM

## 2022-01-26 DIAGNOSIS — M20.41 HAMMER TOE OF RIGHT FOOT: ICD-10-CM

## 2022-01-26 DIAGNOSIS — D36.10 NEUROMA: Primary | ICD-10-CM

## 2022-01-26 PROCEDURE — 20551 NJX 1 TENDON ORIGIN/INSJ: CPT | Performed by: PODIATRIST

## 2022-01-26 PROCEDURE — 99213 OFFICE O/P EST LOW 20 MIN: CPT | Performed by: PODIATRIST

## 2022-01-26 RX ORDER — BETAMETHASONE SODIUM PHOSPHATE AND BETAMETHASONE ACETATE 3; 3 MG/ML; MG/ML
12 INJECTION, SUSPENSION INTRA-ARTICULAR; INTRALESIONAL; INTRAMUSCULAR; SOFT TISSUE ONCE
Status: COMPLETED | OUTPATIENT
Start: 2022-01-26 | End: 2022-01-26

## 2022-01-26 RX ADMIN — BETAMETHASONE SODIUM PHOSPHATE AND BETAMETHASONE ACETATE 12 MG: 3; 3 INJECTION, SUSPENSION INTRA-ARTICULAR; INTRALESIONAL; INTRAMUSCULAR; SOFT TISSUE at 09:55:00

## 2022-01-26 NOTE — PROGRESS NOTES
[facility] Progress Note    Subjective:   Cynthia Benavidez returns for follow-up of the neuroma versus bursitis of her right foot. Last injection was helpful and she would like to proceed with the second injection today.   She has not been out to get better

## 2022-02-08 RX ORDER — ALPRAZOLAM 0.25 MG/1
TABLET ORAL
Qty: 14 TABLET | Refills: 0 | Status: SHIPPED | OUTPATIENT
Start: 2022-02-08

## 2022-02-08 NOTE — TELEPHONE ENCOUNTER
Last VISIT 12/09/21    Last CPE 12/09/21    Last REFILL 01/11/21 qty 14 w/0 refills    Last LABS 11/30/21 CBC, cmp, lIPID, tsh DONE    No Future Appointments      Per PROTOCOL? Not on protocol      Please Approve or Deny.

## 2022-02-09 ENCOUNTER — OFFICE VISIT (OUTPATIENT)
Dept: ORTHOPEDICS CLINIC | Facility: CLINIC | Age: 57
End: 2022-02-09
Payer: MEDICAID

## 2022-02-09 VITALS — WEIGHT: 122 LBS | HEIGHT: 58 IN | BODY MASS INDEX: 25.61 KG/M2

## 2022-02-09 DIAGNOSIS — F17.200 CURRENT SMOKER ON SOME DAYS: ICD-10-CM

## 2022-02-09 DIAGNOSIS — M20.41 HAMMER TOE OF RIGHT FOOT: ICD-10-CM

## 2022-02-09 DIAGNOSIS — D36.10 NEUROMA: Primary | ICD-10-CM

## 2022-02-09 PROCEDURE — 99213 OFFICE O/P EST LOW 20 MIN: CPT | Performed by: PODIATRIST

## 2022-02-09 PROCEDURE — 20600 DRAIN/INJ JOINT/BURSA W/O US: CPT | Performed by: PODIATRIST

## 2022-02-09 PROCEDURE — 3008F BODY MASS INDEX DOCD: CPT | Performed by: PODIATRIST

## 2022-02-09 RX ORDER — BETAMETHASONE SODIUM PHOSPHATE AND BETAMETHASONE ACETATE 3; 3 MG/ML; MG/ML
12 INJECTION, SUSPENSION INTRA-ARTICULAR; INTRALESIONAL; INTRAMUSCULAR; SOFT TISSUE ONCE
Status: COMPLETED | OUTPATIENT
Start: 2022-02-09 | End: 2022-02-09

## 2022-02-09 RX ADMIN — BETAMETHASONE SODIUM PHOSPHATE AND BETAMETHASONE ACETATE 12 MG: 3; 3 INJECTION, SUSPENSION INTRA-ARTICULAR; INTRALESIONAL; INTRAMUSCULAR; SOFT TISSUE at 10:35:00

## 2022-02-09 NOTE — PROGRESS NOTES
[facility] Progress Note    Subjective:     Here for the third injection for neuroma. It has been helpful. She has been using the better shoes when s he can. Objective:     exam right foot third interspace still remains tender mainly at the distal sulcus plantarly. She has a little numbness of the third and fourth toes. Feels the toes are a little stiff                  Assessment/Plan:     Diagnoses and all orders for this visit:    Neuroma    Current smoker on some days    Hammer toe of right foot    Proceeded with third injection today. She tolerated it well. Taping the toe third and fourth toes together and icing as well as resting the foot for 4 to 5 days. Risks and benefits discussed. Sterile prep of innerspace right foot . Injection of .3cc of betamethasone phosphate to painful area of innerspace for neuroma. Continue with good shoes, inserts  If pain continues, consider excision of neuroma. Hayley Madrid. Hero Lemos, AIDANM  [facility]    [employer] speech recognition software was used to prepare this note. If a word or phrase is confusing, it is likely do to a failure of recognition. Please contact me with any questions or clarifications.

## 2022-02-28 RX ORDER — BUPROPION HYDROCHLORIDE 150 MG/1
TABLET, EXTENDED RELEASE ORAL
Qty: 90 TABLET | Refills: 0 | Status: SHIPPED | OUTPATIENT
Start: 2022-02-28

## 2022-02-28 NOTE — TELEPHONE ENCOUNTER
Last OV 12.9.21 w/ AD (annual pe)   Last PE 12.9.21  Last REFILL 11.19.21 Bupropion 150mg #90 0R  Last LABS 11.30.21 TSH w/reflex, Lipid, CMP, CBC    Future Appointments   Date Time Provider Fiordaliza Andres   3/15/2022  7:30 PM SCHEDULE BY DATE [facility]   4/27/2022 12:45 PM Rupesh Glasgow MD [facility]         Per PROTOCOL?  Not on protocol    Please Advise

## 2022-03-15 ENCOUNTER — OFFICE VISIT (OUTPATIENT)
Dept: SLEEP CENTER | Age: 57
End: 2022-03-15
Attending: INTERNAL MEDICINE
Payer: MEDICAID

## 2022-03-15 DIAGNOSIS — R06.81 WITNESSED APNEIC SPELLS: ICD-10-CM

## 2022-03-15 PROCEDURE — 95806 SLEEP STUDY UNATT&RESP EFFT: CPT

## 2022-03-16 NOTE — PROCEDURES
[address]       Accredited by the [place] [employer] (AASM)    PATIENT'S NAME:        Lorena Hawkins  ATTENDING PHYSICIAN:   Jacinda Carter M.D. REFERRING PHYSICIAN:   Waylon Park MD  PATIENT ACCOUNT #:     [de-identified]        LOCATION:       [facility] RECORD #:      [id number]        YOB: 1965  DATE OF STUDY:         03/15/2022    SLEEP STUDY REPORT    STUDY TYPE:  Unattended sleep study. CLINICAL HISTORY:  The patient is a 79-year-old female who weighs 122 pounds and is 5 feet 10 inches tall, who has complaints of loud disruptive snoring. Her [place] Sleepiness Scale score is 6. Comprehensive sleep consultation has been requested. UNATTENDED SLEEP STUDY RECORDING PARAMETERS:  The patient underwent a formal technically adequate unattended diagnostic sleep study [facility] The study was performed in accordance with the AASM standard for Out of Center Sleep Testing. The four-channel Type III HST measures the following parameters:  flow, respiratory effort, pulse, and oxygen saturation. SCORING:  This study was scored in accordance with AASM scoring rules and Medicare rule 1B. POLYSOMNOGRAPHIC FINDINGS:  The total recording time was 9 hours and 16 minutes. Recording started at 10:19 p.m., recording ended at 7:36 a.m.     BODY POSITION:  The patient spent 86% of the recording time in the supine position. RESPIRATORY MEASURES:  Respiratory monitoring revealed a respiratory event index (AHI) of 5.4 events per hour. The supine respiratory event index was 6.1 events per hour. The non-supine respiratory event index (AHI) was 0.9 events per hour. Throughout the study the patient maintained an oxyhemoglobin saturation above 90%. DIAGNOSTIC CODE:  G47.33.     IMPRESSION:  This study along with the clinical history is consistent with obstructive sleep apnea-hypopnea syndrome. RECOMMENDATIONS:  Clinical correlation recommended. Therapeutic options for this patient include behavioral modification with avoidance of supine position, consideration for trial of nasal CPAP, evaluation for a dental device, or evaluation for upper airway surgery. The patient should avoid the use of alcohol and sedative medications, which can worsen obstructive sleep apnea. The patient should avoid driving or operating machinery during periods of sleepiness. As requested by the referring provider, the patient will follow up in the sleep clinic. Arrangements for followup in the sleep clinic will be made through our office. Dictated By Ronn Olsen M.D.  d: 03/16/2022 09:37:06  t: 03/16/2022 12:11:32  Job [id number]  /    cc: Waylon Camacho MD

## 2022-04-27 ENCOUNTER — OFFICE VISIT (OUTPATIENT)
Dept: ELECTROPHYSIOLOGY | Facility: HOSPITAL | Age: 57
End: 2022-04-27
Attending: ORTHOPAEDIC SURGERY
Payer: MEDICAID

## 2022-04-27 DIAGNOSIS — G56.02 CARPAL TUNNEL SYNDROME OF LEFT WRIST: ICD-10-CM

## 2022-04-28 NOTE — PROCEDURES
[facility], [address] [place] [address]      PATIENT'S NAME: Lilian Shrestha   REFERRING PHYSICIAN: Fran Tilley. Roldan Samayoa MD   PATIENT ACCOUNT #: [de-identified] LOCATION: [facility]   MEDICAL RECORD #: [id number] YOB: 1965   DATE OF EXAM: 04/27/2022       ELECTRONEUROMYOGRAPHIC REPORT    CHIEF COMPLAINT:  This patient presented with tingling, numbness sensation in the left hand, more so in the nighttime, for quite some time. The patient is a hairdresser. EMG of left upper extremity was requested to rule out carpal tunnel syndrome. INTERPRETATION:  Left median sensory conduction velocity was significantly slowed, with reduced amplitude. Left median motor distal latency was significantly delayed, with normal amplitude and conduction velocity. 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 Left ulnar sensory and motor responses as well as F wave latency were normal.    Needle examination of left deltoid, biceps, triceps, flexor carpi radialis muscle, as well as left cervical C5-6, C6-7 paraspinal muscle was all normal.      IMPRESSION:  This is an abnormal study. This finding revealed left median neuropathy at wrist compatible with left carpal tunnel syndrome of at least a moderate degree. There is no superimposed left cervical radiculopathy demonstrated at this time. There is incidental finding of left Josse-Stefano anastomosis which is a normal variance of nerve anomaly.     Dictated By Zoë Ortez M.D.  d:   04/27/2022 12:58:15  t:   04/27/2022 15:30:20  [facility]  [id number]  /

## 2022-06-13 ENCOUNTER — OFFICE VISIT (OUTPATIENT)
Dept: INTERNAL MEDICINE CLINIC | Facility: CLINIC | Age: 57
End: 2022-06-13
Payer: MEDICAID

## 2022-06-13 VITALS
TEMPERATURE: 98 F | HEART RATE: 84 BPM | HEIGHT: 58 IN | DIASTOLIC BLOOD PRESSURE: 82 MMHG | SYSTOLIC BLOOD PRESSURE: 104 MMHG | BODY MASS INDEX: 24.56 KG/M2 | WEIGHT: 117 LBS

## 2022-06-13 DIAGNOSIS — R09.82 PND (POST-NASAL DRIP): ICD-10-CM

## 2022-06-13 DIAGNOSIS — J01.90 ACUTE NON-RECURRENT SINUSITIS, UNSPECIFIED LOCATION: Primary | ICD-10-CM

## 2022-06-13 DIAGNOSIS — J02.9 SORE THROAT: ICD-10-CM

## 2022-06-13 LAB
CONTROL LINE PRESENT WITH A CLEAR BACKGROUND (YES/NO): YES YES/NO
KIT LOT #: NORMAL NUMERIC
STREP GRP A CUL-SCR: NEGATIVE

## 2022-06-13 RX ORDER — BUPROPION HYDROCHLORIDE 150 MG/1
TABLET, EXTENDED RELEASE ORAL
Qty: 90 TABLET | Refills: 0 | Status: SHIPPED | OUTPATIENT
Start: 2022-06-13

## 2022-06-13 RX ORDER — AMOXICILLIN AND CLAVULANATE POTASSIUM 875; 125 MG/1; MG/1
1 TABLET, FILM COATED ORAL 2 TIMES DAILY
Qty: 20 TABLET | Refills: 0 | Status: SHIPPED | OUTPATIENT
Start: 2022-06-13 | End: 2022-06-20

## 2022-06-13 NOTE — TELEPHONE ENCOUNTER
Last VISIT 06/13/22    Last CPE 12/09/21    Last REFILL 02/28/22 qty 90 w/0 refills    Last LABS 06/13/22 Covid test done    No Future Appointments        Per PROTOCOL? Not on protocol      Please Approve or Deny.

## 2022-06-14 LAB — SARS-COV-2 RNA RESP QL NAA+PROBE: NOT DETECTED

## 2022-06-21 ENCOUNTER — OFFICE VISIT (OUTPATIENT)
Dept: ORTHOPEDICS CLINIC | Facility: CLINIC | Age: 57
End: 2022-06-21
Payer: MEDICAID

## 2022-06-21 ENCOUNTER — TELEPHONE (OUTPATIENT)
Dept: ORTHOPEDICS CLINIC | Facility: CLINIC | Age: 57
End: 2022-06-21

## 2022-06-21 VITALS — WEIGHT: 115 LBS | BODY MASS INDEX: 24.14 KG/M2 | HEIGHT: 58 IN

## 2022-06-21 DIAGNOSIS — G56.02 CARPAL TUNNEL SYNDROME OF LEFT WRIST: Primary | ICD-10-CM

## 2022-06-21 DIAGNOSIS — G56.02 CARPAL TUNNEL SYNDROME ON LEFT: Primary | ICD-10-CM

## 2022-06-21 PROCEDURE — 99214 OFFICE O/P EST MOD 30 MIN: CPT | Performed by: ORTHOPAEDIC SURGERY

## 2022-06-21 PROCEDURE — 3008F BODY MASS INDEX DOCD: CPT | Performed by: ORTHOPAEDIC SURGERY

## 2022-06-21 NOTE — PROGRESS NOTES
OR BOOKING SHEET   Nerve Decompression/Repair  Name: Lewis Patton  MRN: [id number]   : 1965  Diagnosis:  [x] Carpal tunnel syndrome of left wrist [G56.02]    Disposition:    [x] Outpatient  [] Overnight for HERNAN  [] Overnight for observation and pain control  [] Inpatient procedure    Operative Time:    [] 45 min     [x] 1 hr     []  1.5 hr     [] 2 hr     []  2.5 hr      [] 3 hr      Antibiotics:   [x]  Ancef   []  Clindamycin     Laterality:   [] RIGHT [x] LEFT        [] BILATERAL    Surgical Consent:  Left endoscopic carpal tunnel release, possible open    Procedure Codes:  [x] Endoscopic Carpal Tunnel Release (77815)    [] Cubital Tunnel Release (90768)    [] Open Carpal Tunnel Release (18112)  [] Guyon's Canal Release (95396)  [] Digital Nerve Repair, one nerve (87488)  [] Digital Nerve Repair, additional nerve (79556)  [] Use of Operative Microscope (95148)    Anesthesia Type:  [] General  [] Regional  [x] Pio Block  [] Monitored Anesthesia Care  [] Local Only (1% lidocaine, 0.5% marcaine - No Epi - 1:1 mix)  [] Local Only (1% lidocaine WITH epi, 0.5% marcaine - 1:1 mix)    Additional info:   [] PCP Clearance Needed  [] Cardiac Clearance    Equipment:  []  Local (1% lidocaine, 0.5% marcaine - No Epi - 1:1 mix)  [] Local (1% lidocaine WITH epi, 0.5% marcaine - 1:1 mix)  [x]  Microaire Endoscopic System  []  Lead Hand  []  Sterile Tourniquet  [] Operative Microscope    Positioning:   []  Supine with arm table on OR Table  [x]  Supine with arm table on transport cart    Assistant request:   [x]  Yes - Camilo Becerra  []  No    Anesthesiologist Request:   None

## 2022-06-22 NOTE — TELEPHONE ENCOUNTER
Surgeon: Dr. Que Lind    Date of Surgery: 10/7       Post Op Appt: 10/14 @ 10AM     Prior Authorization:   Inpatient or Outpatient: Outpatient  Facility: [facility]  Work Comp: NO  CPT: 75529  DX: G56.02    Surgical Assistant: Fiona Faria     Preadmission Testing: PER ANESTHESIA GUIDELINES     Pre-Op Clearance Requested: NONE    DME:  NONE NEEDED    Rehab Services Ordered: NONE     Disability Paperwork: NONE    On [place] Calendar: YES    Notes: LT CTR POSSIBLE OPEN. [address], SUPINE WITH ARM TABLE ON TRANSPORT CART.

## 2022-09-01 ENCOUNTER — TELEPHONE (OUTPATIENT)
Dept: ORTHOPEDICS CLINIC | Facility: CLINIC | Age: 57
End: 2022-09-01

## 2022-09-01 NOTE — TELEPHONE ENCOUNTER
Patient stated she is having surgery for her LT hand but would like to also discuss surgery for her RT hand. Patient stated she is having the same issues on her RT hand. Patient has never been seen for her RT hand before. Please advise.

## 2022-09-01 NOTE — TELEPHONE ENCOUNTER
Pre-Admission stated they spoke to the patient in regards her upcoming surgery on 09/07. Patient stated that surgery was going to be rescheduled. Pre-Admission just wants to know confirm. Please advise.

## 2022-09-01 NOTE — TELEPHONE ENCOUNTER
Spoke to patient who states that she would like to have surgery on both hands but has not been seen for her right hand. Advised pt she will need to be evaluated for her right hand as well, pt verbalized understanding and will call back to schedule an appt for her right hand after her surgery.

## 2022-09-06 NOTE — PAT NURSING NOTE
Multiple attempts have been made to contact the patient to screen prior to surgery scheduled  For 9/7/22. PAT unable to get in contact with the pt. LM for Ajith Lamas [phone] at Dr Rosey Mariscal office with David Mcginnis.      Terra Pena RN, BSN  [address] [facility]  [phone]'

## 2022-09-08 RX ORDER — BUPROPION HYDROCHLORIDE 150 MG/1
TABLET, EXTENDED RELEASE ORAL
Qty: 90 TABLET | Refills: 0 | Status: SHIPPED | OUTPATIENT
Start: 2022-09-08

## 2022-09-08 NOTE — TELEPHONE ENCOUNTER
Last VISIT 06/13/22    Last CPE 12/09/21    Last REFILL 06/13/22 qty 90 w/0 refills     Last LABS 06/13/22 Covid test, Strep. No Future Appointments       Per PROTOCOL? Not on protocol      Please Approve or Deny.

## 2022-10-04 ENCOUNTER — LAB ENCOUNTER (OUTPATIENT)
Dept: LAB | Age: 57
End: 2022-10-04
Attending: ORTHOPAEDIC SURGERY
Payer: MEDICAID

## 2022-10-04 DIAGNOSIS — Z01.812 ENCOUNTER FOR PREOPERATIVE SCREENING LABORATORY TESTING FOR COVID-19 VIRUS: ICD-10-CM

## 2022-10-04 DIAGNOSIS — Z20.822 ENCOUNTER FOR PREOPERATIVE SCREENING LABORATORY TESTING FOR COVID-19 VIRUS: ICD-10-CM

## 2022-10-05 LAB — SARS-COV-2 RNA RESP QL NAA+PROBE: NOT DETECTED

## 2022-10-06 RX ORDER — SODIUM CHLORIDE, SODIUM LACTATE, POTASSIUM CHLORIDE, CALCIUM CHLORIDE 600; 310; 30; 20 MG/100ML; MG/100ML; MG/100ML; MG/100ML
INJECTION, SOLUTION INTRAVENOUS CONTINUOUS
Status: CANCELLED | OUTPATIENT
Start: 2022-10-06

## 2022-10-07 ENCOUNTER — ANESTHESIA EVENT (OUTPATIENT)
Dept: SURGERY | Facility: HOSPITAL | Age: 57
End: 2022-10-07
Payer: MEDICAID

## 2022-10-07 ENCOUNTER — HOSPITAL ENCOUNTER (OUTPATIENT)
Facility: HOSPITAL | Age: 57
Setting detail: HOSPITAL OUTPATIENT SURGERY
Discharge: HOME | End: 2022-10-07
Attending: ORTHOPAEDIC SURGERY | Admitting: ORTHOPAEDIC SURGERY
Payer: MEDICAID

## 2022-10-07 ENCOUNTER — ANESTHESIA (OUTPATIENT)
Dept: SURGERY | Facility: HOSPITAL | Age: 57
End: 2022-10-07
Payer: MEDICAID

## 2022-10-07 VITALS
SYSTOLIC BLOOD PRESSURE: 109 MMHG | TEMPERATURE: 99 F | RESPIRATION RATE: 16 BRPM | WEIGHT: 117 LBS | HEIGHT: 58 IN | OXYGEN SATURATION: 100 % | DIASTOLIC BLOOD PRESSURE: 71 MMHG | BODY MASS INDEX: 24.56 KG/M2 | HEART RATE: 57 BPM

## 2022-10-07 DIAGNOSIS — G56.02 CARPAL TUNNEL SYNDROME ON LEFT: ICD-10-CM

## 2022-10-07 DIAGNOSIS — Z01.812 ENCOUNTER FOR PREPROCEDURE SCREENING LABORATORY TESTING FOR COVID-19: Primary | ICD-10-CM

## 2022-10-07 DIAGNOSIS — Z01.812 ENCOUNTER FOR PREOPERATIVE SCREENING LABORATORY TESTING FOR COVID-19 VIRUS: ICD-10-CM

## 2022-10-07 DIAGNOSIS — Z20.822 ENCOUNTER FOR PREOPERATIVE SCREENING LABORATORY TESTING FOR COVID-19 VIRUS: ICD-10-CM

## 2022-10-07 DIAGNOSIS — Z20.822 ENCOUNTER FOR PREPROCEDURE SCREENING LABORATORY TESTING FOR COVID-19: Primary | ICD-10-CM

## 2022-10-07 PROCEDURE — 01N54ZZ RELEASE MEDIAN NERVE, PERCUTANEOUS ENDOSCOPIC APPROACH: ICD-10-PCS | Performed by: ORTHOPAEDIC SURGERY

## 2022-10-07 RX ORDER — LIDOCAINE HYDROCHLORIDE 10 MG/ML
INJECTION, SOLUTION EPIDURAL; INFILTRATION; INTRACAUDAL; PERINEURAL AS NEEDED
Status: DISCONTINUED | OUTPATIENT
Start: 2022-10-07 | End: 2022-10-07 | Stop reason: SURG

## 2022-10-07 RX ORDER — NALOXONE HYDROCHLORIDE 0.4 MG/ML
80 INJECTION, SOLUTION INTRAMUSCULAR; INTRAVENOUS; SUBCUTANEOUS AS NEEDED
Status: DISCONTINUED | OUTPATIENT
Start: 2022-10-07 | End: 2022-10-07

## 2022-10-07 RX ORDER — HYDROMORPHONE HYDROCHLORIDE 1 MG/ML
0.6 INJECTION, SOLUTION INTRAMUSCULAR; INTRAVENOUS; SUBCUTANEOUS EVERY 5 MIN PRN
Status: DISCONTINUED | OUTPATIENT
Start: 2022-10-07 | End: 2022-10-07

## 2022-10-07 RX ORDER — DEXAMETHASONE SODIUM PHOSPHATE 4 MG/ML
VIAL (ML) INJECTION AS NEEDED
Status: DISCONTINUED | OUTPATIENT
Start: 2022-10-07 | End: 2022-10-07 | Stop reason: SURG

## 2022-10-07 RX ORDER — CEFAZOLIN SODIUM/WATER 2 G/20 ML
2 SYRINGE (ML) INTRAVENOUS ONCE
Status: COMPLETED | OUTPATIENT
Start: 2022-10-07 | End: 2022-10-07

## 2022-10-07 RX ORDER — HYDROMORPHONE HYDROCHLORIDE 1 MG/ML
0.4 INJECTION, SOLUTION INTRAMUSCULAR; INTRAVENOUS; SUBCUTANEOUS EVERY 5 MIN PRN
Status: DISCONTINUED | OUTPATIENT
Start: 2022-10-07 | End: 2022-10-07

## 2022-10-07 RX ORDER — BUPIVACAINE HYDROCHLORIDE 5 MG/ML
INJECTION, SOLUTION EPIDURAL; INTRACAUDAL AS NEEDED
Status: DISCONTINUED | OUTPATIENT
Start: 2022-10-07 | End: 2022-10-07 | Stop reason: HOSPADM

## 2022-10-07 RX ORDER — TRAMADOL HYDROCHLORIDE 50 MG/1
50 TABLET ORAL EVERY 6 HOURS PRN
Qty: 5 TABLET | Refills: 0 | Status: SHIPPED | OUTPATIENT
Start: 2022-10-07

## 2022-10-07 RX ORDER — SODIUM CHLORIDE, SODIUM LACTATE, POTASSIUM CHLORIDE, CALCIUM CHLORIDE 600; 310; 30; 20 MG/100ML; MG/100ML; MG/100ML; MG/100ML
INJECTION, SOLUTION INTRAVENOUS CONTINUOUS
Status: DISCONTINUED | OUTPATIENT
Start: 2022-10-07 | End: 2022-10-07

## 2022-10-07 RX ORDER — ACETAMINOPHEN 500 MG
1000 TABLET ORAL ONCE AS NEEDED
Status: DISCONTINUED | OUTPATIENT
Start: 2022-10-07 | End: 2022-10-07

## 2022-10-07 RX ORDER — KETOROLAC TROMETHAMINE 30 MG/ML
INJECTION, SOLUTION INTRAMUSCULAR; INTRAVENOUS AS NEEDED
Status: DISCONTINUED | OUTPATIENT
Start: 2022-10-07 | End: 2022-10-07 | Stop reason: SURG

## 2022-10-07 RX ORDER — HYDROMORPHONE HYDROCHLORIDE 1 MG/ML
0.2 INJECTION, SOLUTION INTRAMUSCULAR; INTRAVENOUS; SUBCUTANEOUS EVERY 5 MIN PRN
Status: DISCONTINUED | OUTPATIENT
Start: 2022-10-07 | End: 2022-10-07

## 2022-10-07 RX ORDER — SCOLOPAMINE TRANSDERMAL SYSTEM 1 MG/1
1 PATCH, EXTENDED RELEASE TRANSDERMAL ONCE
Status: DISCONTINUED | OUTPATIENT
Start: 2022-10-07 | End: 2022-10-07 | Stop reason: HOSPADM

## 2022-10-07 RX ORDER — ONDANSETRON 2 MG/ML
INJECTION INTRAMUSCULAR; INTRAVENOUS AS NEEDED
Status: DISCONTINUED | OUTPATIENT
Start: 2022-10-07 | End: 2022-10-07 | Stop reason: SURG

## 2022-10-07 RX ORDER — ACETAMINOPHEN 500 MG
1000 TABLET ORAL ONCE
Status: DISCONTINUED | OUTPATIENT
Start: 2022-10-07 | End: 2022-10-07 | Stop reason: HOSPADM

## 2022-10-07 RX ORDER — HYDROCODONE BITARTRATE AND ACETAMINOPHEN 5; 325 MG/1; MG/1
1 TABLET ORAL ONCE AS NEEDED
Status: DISCONTINUED | OUTPATIENT
Start: 2022-10-07 | End: 2022-10-07

## 2022-10-07 RX ORDER — MIDAZOLAM HYDROCHLORIDE 1 MG/ML
INJECTION INTRAMUSCULAR; INTRAVENOUS AS NEEDED
Status: DISCONTINUED | OUTPATIENT
Start: 2022-10-07 | End: 2022-10-07 | Stop reason: SURG

## 2022-10-07 RX ORDER — HYDROCODONE BITARTRATE AND ACETAMINOPHEN 5; 325 MG/1; MG/1
2 TABLET ORAL ONCE AS NEEDED
Status: DISCONTINUED | OUTPATIENT
Start: 2022-10-07 | End: 2022-10-07

## 2022-10-07 RX ADMIN — SODIUM CHLORIDE, SODIUM LACTATE, POTASSIUM CHLORIDE, CALCIUM CHLORIDE: 600; 310; 30; 20 INJECTION, SOLUTION INTRAVENOUS at 09:53:00

## 2022-10-07 RX ADMIN — CEFAZOLIN SODIUM/WATER 2 G: 2 G/20 ML SYRINGE (ML) INTRAVENOUS at 09:32:00

## 2022-10-07 RX ADMIN — LIDOCAINE HYDROCHLORIDE 30 MG: 10 INJECTION, SOLUTION EPIDURAL; INFILTRATION; INTRACAUDAL; PERINEURAL at 09:31:00

## 2022-10-07 RX ADMIN — DEXAMETHASONE SODIUM PHOSPHATE 4 MG: 4 MG/ML VIAL (ML) INJECTION at 09:36:00

## 2022-10-07 RX ADMIN — MIDAZOLAM HYDROCHLORIDE 2 MG: 1 INJECTION INTRAMUSCULAR; INTRAVENOUS at 09:29:00

## 2022-10-07 RX ADMIN — SODIUM CHLORIDE, SODIUM LACTATE, POTASSIUM CHLORIDE, CALCIUM CHLORIDE: 600; 310; 30; 20 INJECTION, SOLUTION INTRAVENOUS at 09:29:00

## 2022-10-07 RX ADMIN — ONDANSETRON 4 MG: 2 INJECTION INTRAMUSCULAR; INTRAVENOUS at 09:43:00

## 2022-10-07 RX ADMIN — KETOROLAC TROMETHAMINE 30 MG: 30 INJECTION, SOLUTION INTRAMUSCULAR; INTRAVENOUS at 09:46:00

## 2022-10-07 NOTE — OPERATIVE REPORT
Operative Note    Patient Name: Noah Clifford    Preoperative Diagnosis:     1. Carpal tunnel syndrome on left [G56.02]    Postoperative Diagnosis:     1. Carpal tunnel syndrome on left [G56.02]    Surgeon(s) and Role:     Gerald Butt MD - Primary     Assistant: Reema Thakkar PA-C    A PA was needed for the successful completion of this case. She was essential for the proper positioning of patient, manipulation of instruments, proper exposure, manipulation of soft tissue, and wound closure. Procedures:     1. Left endoscopic carpal tunnel release (38133)    Antibiotics: Ancef 2g    Surgical Findings: Normal Anatomy     Anesthesia: General    Complications: None    Implants: None    Specimen: None    Condition: Stable    Estimated Blood Loss: 1mL    Indications:  65year old female with EMG/NCV confirmed left carpal tunnel syndrome that failed non-surgical management. Patient consented to an endoscopic carpal tunnel release possible open having understood the risks associated with surgery, expected outcome, time to recovery and the need for possible rehab. Risks that were discussed but not limited to infection, nerve injury, tendon injury, artery injury, need for additional surgery, and no improvements of present symptoms. Procedure:  Patient was met in the preoperative holding area where consent was verified, laterality was marked with the surgeon's initials, and the H&P was updated. Patient was brought to the operating room on a transport cart. Patient was then transferred onto the operating room table and placed in supine position with an arm table and with bony prominences well-padded. SCDs were placed. Antibiotics were fully infused. An upper arm tourniquet was placed. The arm was then prepped and draped in the usual sterile fashion. A surgical timeout was performed and the limb was exsanguinated using Esmarch bandage. Tourniquet was raised to 250mmHg.      A transverse incision through the dermis was made 5mm proximal to the distal volar wrist crease just ulnar to the palmaris longus using a 15 blade. Adson's and Trudell Passey scissors was used to dissect through the subcutaneous tissue onto the antebrachial fascia. A distally based 1 cm wide rectangular flap was elevated using a [place] blade. The flap was retracted distally and volarly allowing access to the carpal tunnel. The undersurface of the transverse carpal ligament was accessed, cleaned, and dilated. After assessing the width of the ligament, the microaire endoscopic apparatus with camera was inserted into the carpal tunnel. Under camera magnification with direct visualization of the undersurface of the transverse carpal ligament, the blade was engaged at the distal extents of the ligament and the release of the ligament was carried out distal to proximal.  I verified the release with the endoscopy camera noting divergent edges of the transverse carpal ligament. I also physically verified with a tenosynovium elevator complete release of the transverse carpal ligament. 0.5% marcaine was injected in subcutaneous tissue. The tourniquet was then lowered and bipolar cautery was used to achieve hemostasis. Closure: The incision was closed using 4-0 Monocryl in a buried simple interrupted fashion. Exofin skin glue was applied and Steri-Strips were placed. Dressing/Splint:  Tegaderm with a pad was applied over the endoscopic carpal tunnel incision. Post Operative:  Patient was woken up from anesthesia and taken to PACU for further recovery and discharge home. Garrett Mckeon MD  Hand, Wrist, & Elbow Surgery  [facility]  [facility] [address], [address] [facility], [address] [place] [address] [place]. [facility]  [email or web address]  t: [id number]  f: [phone]

## 2022-10-07 NOTE — ANESTHESIA PROCEDURE NOTES
Airway  Date/Time: 10/7/2022 9:33 AM  Urgency: elective    Airway not difficult    General Information and Staff    Patient location during procedure: OR  Anesthesiologist: Sparkle Monroy DO  Resident/CRNA: Elba Renae CRNA  Performed: CRNA     Indications and Patient Condition  Indications for airway management: anesthesia  Sedation level: deep  Preoxygenated: yes  Patient position: sniffing  Mask difficulty assessment: 1 - vent by mask    Final Airway Details  Final airway type: supraglottic airway      Successful airway: classic  Size 3      Number of attempts at approach: 1

## 2022-10-14 ENCOUNTER — OFFICE VISIT (OUTPATIENT)
Dept: ORTHOPEDICS CLINIC | Facility: CLINIC | Age: 57
End: 2022-10-14
Payer: MEDICAID

## 2022-10-14 VITALS — WEIGHT: 117 LBS | BODY MASS INDEX: 24.56 KG/M2 | HEIGHT: 58 IN

## 2022-10-14 DIAGNOSIS — G56.02 CARPAL TUNNEL SYNDROME ON LEFT: Primary | ICD-10-CM

## 2022-10-14 PROCEDURE — 99024 POSTOP FOLLOW-UP VISIT: CPT | Performed by: PHYSICIAN ASSISTANT

## 2022-10-14 PROCEDURE — 3008F BODY MASS INDEX DOCD: CPT | Performed by: PHYSICIAN ASSISTANT

## 2022-11-14 ENCOUNTER — OFFICE VISIT (OUTPATIENT)
Dept: ORTHOPEDICS CLINIC | Facility: CLINIC | Age: 57
End: 2022-11-14
Payer: MEDICAID

## 2022-11-14 VITALS — BODY MASS INDEX: 24.56 KG/M2 | HEIGHT: 58 IN | WEIGHT: 117 LBS

## 2022-11-14 DIAGNOSIS — G56.02 CARPAL TUNNEL SYNDROME ON LEFT: Primary | ICD-10-CM

## 2022-11-14 PROCEDURE — 99024 POSTOP FOLLOW-UP VISIT: CPT | Performed by: PHYSICIAN ASSISTANT

## 2022-11-14 PROCEDURE — 3008F BODY MASS INDEX DOCD: CPT | Performed by: PHYSICIAN ASSISTANT

## 2022-12-19 RX ORDER — BUPROPION HYDROCHLORIDE 150 MG/1
TABLET, EXTENDED RELEASE ORAL
Qty: 90 TABLET | Refills: 0 | Status: SHIPPED | OUTPATIENT
Start: 2022-12-19

## 2023-03-20 RX ORDER — BUPROPION HYDROCHLORIDE 150 MG/1
TABLET, EXTENDED RELEASE ORAL
Qty: 90 TABLET | Refills: 0 | Status: SHIPPED | OUTPATIENT
Start: 2023-03-20

## 2023-06-05 ENCOUNTER — TELEPHONE (OUTPATIENT)
Dept: INTERNAL MEDICINE CLINIC | Facility: CLINIC | Age: 58
End: 2023-06-05

## 2023-06-05 DIAGNOSIS — Z13.228 SCREENING FOR METABOLIC DISORDER: ICD-10-CM

## 2023-06-05 DIAGNOSIS — Z13.29 SCREENING FOR THYROID DISORDER: ICD-10-CM

## 2023-06-05 DIAGNOSIS — Z13.220 SCREENING FOR LIPID DISORDERS: ICD-10-CM

## 2023-06-05 DIAGNOSIS — Z00.00 ROUTINE GENERAL MEDICAL EXAMINATION AT A HEALTH CARE FACILITY: ICD-10-CM

## 2023-06-05 DIAGNOSIS — Z13.0 SCREENING FOR DISORDER OF BLOOD AND BLOOD-FORMING ORGANS: ICD-10-CM

## 2023-06-05 NOTE — TELEPHONE ENCOUNTER
Pt is scheduled for cpe Informed must fast no call back required.  Orders to [facility]   Future Appointments   Date Time Provider Fiordaliza Mckenna   7/21/2023  1:00 PM Lillian Bernabe MD [facility]

## 2023-06-19 RX ORDER — BUPROPION HYDROCHLORIDE 150 MG/1
TABLET, EXTENDED RELEASE ORAL
Qty: 90 TABLET | Refills: 0 | Status: SHIPPED | OUTPATIENT
Start: 2023-06-19

## 2023-07-19 LAB
ABSOLUTE BASOPHILS: 41 CELLS/UL (ref 0–200)
ABSOLUTE EOSINOPHILS: 100 CELLS/UL (ref 15–500)
ABSOLUTE LYMPHOCYTES: 1847 CELLS/UL (ref 850–3900)
ABSOLUTE MONOCYTES: 484 CELLS/UL (ref 200–950)
ABSOLUTE NEUTROPHILS: 3428 CELLS/UL (ref 1500–7800)
ALBUMIN/GLOBULIN RATIO: 2 (CALC) (ref 1–2.5)
ALBUMIN: 4.5 G/DL (ref 3.6–5.1)
ALKALINE PHOSPHATASE: 70 U/L (ref 37–153)
ALT: 12 U/L (ref 6–29)
AST: 16 U/L (ref 10–35)
BASOPHILS: 0.7 %
BILIRUBIN, TOTAL: 0.4 MG/DL (ref 0.2–1.2)
BUN: 13 MG/DL (ref 7–25)
CALCIUM: 9.5 MG/DL (ref 8.6–10.4)
CARBON DIOXIDE: 31 MMOL/L (ref 20–32)
CHLORIDE: 103 MMOL/L (ref 98–110)
CHOL/HDLC RATIO: 3 (CALC)
CHOLESTEROL, TOTAL: 238 MG/DL
CREATININE: 0.77 MG/DL (ref 0.5–1.03)
EGFR: 90 ML/MIN/1.73M2
EOSINOPHILS: 1.7 %
GLOBULIN: 2.2 G/DL (CALC) (ref 1.9–3.7)
GLUCOSE: 91 MG/DL (ref 65–99)
HDL CHOLESTEROL: 79 MG/DL
HEMATOCRIT: 39.7 % (ref 35–45)
HEMOGLOBIN: 13.2 G/DL (ref 11.7–15.5)
LDL-CHOLESTEROL: 141 MG/DL (CALC)
LYMPHOCYTES: 31.3 %
MCH: 29.8 PG (ref 27–33)
MCHC: 33.2 G/DL (ref 32–36)
MCV: 89.6 FL (ref 80–100)
MONOCYTES: 8.2 %
MPV: 10.8 FL (ref 7.5–12.5)
NEUTROPHILS: 58.1 %
NON-HDL CHOLESTEROL: 159 MG/DL (CALC)
PLATELET COUNT: 251 THOUSAND/UL (ref 140–400)
POTASSIUM: 4.5 MMOL/L (ref 3.5–5.3)
PROTEIN, TOTAL: 6.7 G/DL (ref 6.1–8.1)
RDW: 12.5 % (ref 11–15)
RED BLOOD CELL COUNT: 4.43 MILLION/UL (ref 3.8–5.1)
SODIUM: 141 MMOL/L (ref 135–146)
TRIGLYCERIDES: 85 MG/DL
TSH W/REFLEX TO FT4: 2.31 MIU/L (ref 0.4–4.5)
WHITE BLOOD CELL COUNT: 5.9 THOUSAND/UL (ref 3.8–10.8)

## 2023-07-21 ENCOUNTER — OFFICE VISIT (OUTPATIENT)
Dept: INTERNAL MEDICINE CLINIC | Facility: CLINIC | Age: 58
End: 2023-07-21
Payer: MEDICAID

## 2023-07-21 VITALS
BODY MASS INDEX: 24.73 KG/M2 | HEART RATE: 81 BPM | DIASTOLIC BLOOD PRESSURE: 66 MMHG | SYSTOLIC BLOOD PRESSURE: 108 MMHG | HEIGHT: 57.87 IN | OXYGEN SATURATION: 97 % | WEIGHT: 117.81 LBS

## 2023-07-21 DIAGNOSIS — F41.8 DEPRESSION WITH ANXIETY: ICD-10-CM

## 2023-07-21 DIAGNOSIS — E78.00 PURE HYPERCHOLESTEROLEMIA: ICD-10-CM

## 2023-07-21 DIAGNOSIS — F17.200 TOBACCO DEPENDENCE: ICD-10-CM

## 2023-07-21 DIAGNOSIS — H93.13 BILATERAL TINNITUS: ICD-10-CM

## 2023-07-21 DIAGNOSIS — H81.20 VESTIBULAR NEURITIS, UNSPECIFIED LATERALITY: ICD-10-CM

## 2023-07-21 DIAGNOSIS — F90.9 ATTENTION DEFICIT HYPERACTIVITY DISORDER (ADHD), UNSPECIFIED ADHD TYPE: ICD-10-CM

## 2023-07-21 DIAGNOSIS — Z12.31 ENCOUNTER FOR SCREENING MAMMOGRAM FOR MALIGNANT NEOPLASM OF BREAST: ICD-10-CM

## 2023-07-21 DIAGNOSIS — G56.02 LEFT CARPAL TUNNEL SYNDROME: ICD-10-CM

## 2023-07-21 DIAGNOSIS — Z00.00 ROUTINE GENERAL MEDICAL EXAMINATION AT A HEALTH CARE FACILITY: Primary | ICD-10-CM

## 2023-07-21 DIAGNOSIS — Z86.010 HISTORY OF ADENOMATOUS POLYP OF COLON: ICD-10-CM

## 2023-07-21 DIAGNOSIS — G47.33 OSA (OBSTRUCTIVE SLEEP APNEA): ICD-10-CM

## 2023-07-21 PROCEDURE — 3078F DIAST BP <80 MM HG: CPT | Performed by: INTERNAL MEDICINE

## 2023-07-21 PROCEDURE — 99396 PREV VISIT EST AGE 40-64: CPT | Performed by: INTERNAL MEDICINE

## 2023-07-21 PROCEDURE — 3008F BODY MASS INDEX DOCD: CPT | Performed by: INTERNAL MEDICINE

## 2023-07-21 PROCEDURE — 3074F SYST BP LT 130 MM HG: CPT | Performed by: INTERNAL MEDICINE

## 2023-09-25 RX ORDER — BUPROPION HYDROCHLORIDE 150 MG/1
150 TABLET, EXTENDED RELEASE ORAL DAILY
Qty: 90 TABLET | Refills: 1 | Status: SHIPPED | OUTPATIENT
Start: 2023-09-25

## 2023-09-25 NOTE — TELEPHONE ENCOUNTER
Last visit 7/21/23 for annual        Anxiety and depressed mood:  Stable, but active symptoms  Continue infrequent alprazolam use  Feels Wellbutrin therapy is helpful at current dose, which is to continue

## 2023-10-17 NOTE — TELEPHONE ENCOUNTER
Labs reviewed and stable Spoke with pt. Pt agreeable to VV. Pt stated she can do VV at 5:20. Confirmed phone number [phone] . Routing to AD for fyi.

## 2023-11-06 ENCOUNTER — TELEPHONE (OUTPATIENT)
Dept: INTERNAL MEDICINE CLINIC | Facility: CLINIC | Age: 58
End: 2023-11-06

## 2023-11-06 DIAGNOSIS — H81.10 BENIGN PAROXYSMAL POSITIONAL VERTIGO, UNSPECIFIED LATERALITY: Primary | ICD-10-CM

## 2023-11-06 NOTE — TELEPHONE ENCOUNTER
Pt stated she saw AD on 7/21 and requesting PT for Vertigo.  Pt stated she saw Nely Joy in the past for her Vertigo and was really helpful.  Pt wants a new order for her.  Pt requesting a call with questions/or approval     Nely Joy ([facility]) [facility]  Pt stated she is in network and her phone number is [phone]

## 2023-11-06 NOTE — TELEPHONE ENCOUNTER
Last visit with AD 7/2023 with vestibular therapy ordered. Ok to reorder again as patient would like to continue.   would you also recommend ENT/audiology consult for possible meniere's r/t hearing loss, tinnitus, and dizziness. Will confirm if she had completed but given symptoms persistent.

## 2023-11-07 NOTE — TELEPHONE ENCOUNTER
Okay to order further vestibular rehabilitation therapy. Can see ENT, but she has undergone evaluation by neuro and has been diagnosed with bppv as her symptoms at the time were isolated of any other symptoms suggestive of meniere's disease. Maybe we can hold eval by ENT until follow-up when we can discuss presence of any other symptoms.

## 2023-11-14 ENCOUNTER — OFFICE VISIT (OUTPATIENT)
Dept: PHYSICAL THERAPY | Facility: HOSPITAL | Age: 58
End: 2023-11-14
Attending: INTERNAL MEDICINE
Payer: MEDICAID

## 2023-11-14 DIAGNOSIS — H81.10 BENIGN PAROXYSMAL POSITIONAL VERTIGO, UNSPECIFIED LATERALITY: Primary | ICD-10-CM

## 2023-11-14 PROCEDURE — 97162 PT EVAL MOD COMPLEX 30 MIN: CPT

## 2023-11-14 PROCEDURE — 97112 NEUROMUSCULAR REEDUCATION: CPT

## 2023-11-14 NOTE — PATIENT INSTRUCTIONS
Access Code: [email or web address]  URL: [email or web address]  Date: 11/14/2023  Prepared by: Balwinder Mckeon    Exercises  - Stand with eyes closed- grounding  - 1 x daily - 7 x weekly - 2 sets - 30 second hold  - Semi-Tandem Corner Balance With Eyes Open  - 1 x daily - 7 x weekly - 2 sets - 10 reps  - Walking with Head Rotation  - 1 x daily - 7 x weekly - 2 sets - 5 reps  - All fours- knee hover  - 1 x daily - 7 x weekly - 2 sets - 10 reps - 5 second hold  - Bird Dog  - 1 x daily - 7 x weekly - 2 sets - 5 reps - 3-5 seconds hold

## 2023-11-14 NOTE — PROGRESS NOTES
VESTIBULAR EVALUATION:   Referring Physician: Waylon Tom  Diagnosis: L vestibular neuritis, PPPD     Date of Service: 11/14/23   Insurance:  [facility]      PATIENT SUMMARY   Geri Levine is a 63year old female who presents to therapy today with reports of dizziness and imbalance. History/onset of current condition: Pt. Reports that she has had a long history of intermittent dizziness with PT in 2021 that helped improve symptoms and allow pt. To function independently. PT. Reports that she has not been doing her home exercises for some time, and has noticed a gradual onset of symptoms that have been worsening, especially in the past month. Pt. Noted that one month ago she was hiking with her  and required UE support due to symptoms, which spurred her to seek help for her dizziness. She has a history of generalized anxiety disorder, well-managed presently, and noted an increased due to recent exacerbation of dizziness. Symptoms with cough/sneeze or loud noise: No  Falls: No  Hx of migraines: No  Hx of vision issue: No  Hx of hearing issues: fullness and pain bilaterally, audiogram WNL    Dizziness: Current  7/10, Best 2/10, Worst 8/10  Quality: spinning occ, imbalance, dysequilibrium  Frequency/Duration:  Daily, varies in intensity  Aggravates: Bending over, Quick head movements, Repetitive movements, Turning/direction changes, Looking/reaching up, Visual motion and Shopping  Relieves: Not moving and Sitting down    Current functional limitations include difficulty shopping, cleaning, changing direction during walking, gardening, picking up objects from floor, unable to carry objects on stairs. Difficulty working when moving around clients. Unable to climb on a ladder, hike on uneven surfaces, unable to exercise.   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 Pt goals include be able to hike, climb on a ladder, work without dizziness, shop, yard work. Past medical history was reviewed with Luis Ramires. Significant findings include Carpal tunnel syndrome, vitamin D deficiency, kidney stones, H pylori infection, neuroma right foot. ASSESSMENT  Luis Ramires presents to physical therapy evaluation with primary c/o dizziness and imbalance that is limiting function. The results of the objective tests and measures show impaired postural stability, impaired eye/head coordination. Functional deficits include but are not limited to impaired gait with visual scanning, Functional Gait Assessment lower than normal.  PT discussed evaluation findings, pathology, plan of care and home exercise program with pt. Pt voiced understanding and performs home exercise program correctly. Skilled Physical Therapy is medically necessary to address the above impairments and reach functional goals. Precautions:  Fall Risk  OBJECTIVE:   Physical Exam:  Posture/Observation: pt. Arrived without assistive device, good posture   Neuro Screen: Sensation: WNL both LE's for light touch screen     Coordination Testing:   Finger to Nose: WNL   Pronation/supination: WNL   Toe tapping:  WNL     Cervical spine ROM: WNL all directions  Adverse neuro signs with ROM:  none     Oculomotor & Vestibular Exam:  Spontaneous Nystagmus: room light: none ;  fixation blocked: none  Smooth Pursuit: Negative  Saccades: Negative  Gaze Evoked Nystagmus:  room light: Negative; fixation blocked: Negative  Head Thrust: Negative  VOR screen:  WNL but with increased symptoms  VOR Cancellation: Negative   Convergence: Negative  Cover/Uncover:  Negative  Cross Cover:  Negative  Head Shaking Nystagmus: Negative  Dynamic Visual Acuity:  Negative- degraded 1 line    Positional Testing:   [place]-Hallpike: R Negative, no symptoms, L Negative, no symptoms   Roll Test [facility]): Negative, no symptoms    Postural Control:   Romberg: EO 30 sec, EC 30 sec with increased sway, reports dizziness 3/10  Romberg on Foam: EO 30 sec, EC unable  Tandem Stance: 10 sec ea foot forward- increased challenge  SLS: R EO 5 sec, EC 0 sec; L EO 5 sec, EC 0 sec     Functional Mobility:   Gait: pt ambulates on level ground with decreased arm swing and path deviation with visual scanning. Functional Gait Assessment   Item Description Score (0 worst, 3 best)    ___3____1. Gait Level Surface-  Time: ____5.4______seconds  ___2____2. Change in gait speed  ___2____3. Gait with horizontal head turns   ___2____4. Gait with vertical head turns  ___2____5. Gait and pivot turn  ___3____6. Step over obstacle  ___1____7. Gait with narrow base of support-  # of steps___3_____  ___1____8. Gait with eyes closed-  Time: ___14_______seconds  ___2____9. Ambulating backward  ___2____10. Steps    TOTAL SCORE: ____20___/30    (less than 22/30 = fall risk)    Five Times Sit to Stand Test: 8.2 sec   Stairs:must use UE support, reciprocal pattern, SBA without rail due to imbalance    Today's Treatment and Response:   Pt education was provided on exam findings, treatment diagnosis, treatment plan, expectations, and prognosis. Pt was also provided recommendations for postural corrections, detrimental fear avoidance behaviors, importance of remaining active and possible dizziness after evaluation. Patient was instructed in and issued a Home Exercise Program to address current impairments. Charges: PT Eval Moderate Complexity, NMR       Total Timed Treatment: 50 min     Total Treatment Time: 50 min     Based on clinical rationale and outcome measures, this evaluation involved Moderate Complexity decision making due to 3+ personal factors/comorbidities, 3 body structures involved/activity limitations, and unstable symptoms including  dizziness and imbalance . PLAN OF CARE:    Goals: (to be met in 8 visits)  1.   Improved Functional Gait Assessment score to at least 27/30 to reflect an improvement in gait and functional balance. 2.  Pt. Able to  10 objects from the floor without loss of balance or dizziness in order to improve function for household chores and gardening. 3. Indep on stairs with reciprocal pattern, carrying 15 lbs, in order to improve functional mobility for household chores. 4.  Indep climbing up/down 6 ft ladder without dizziness. 5.  Indep gait on even and uneven surfaces including hills for up to 30 min in order to return to community gait and walking for exercise. Frequency / Duration: Patient will be seen for 2 x/week or a total of 8 visits over a 90 day period. Treatment will include: home exercise program development and instruction, patient/family education, balance training, canalith repositioning maneuver, eye/head coordination exercises, sensory organization training, optokinetic stimulation , gait training and strengthening. Education or treatment limitation: None   Rehab Potential: good     Patient was advised of these findings, precautions, and treatment options and has agreed to actively participate in planning and for this course of care. Thank you for your referral. Please co-sign or sign and return this letter via fax as soon as possible to [phone]. If you have any questions, please contact me at 21 [phone]. Sincerely,  Electronically signed by therapist: Pamela Sewell PT  [de-identified] certification required: Yes  I certify the need for these services furnished under this plan of treatment and while under my care.     X___________________________________________________ Date____________________    Certification From: 62/88/93  To  2/12/24

## 2023-12-07 ENCOUNTER — OFFICE VISIT (OUTPATIENT)
Dept: PHYSICAL THERAPY | Facility: HOSPITAL | Age: 58
End: 2023-12-07
Attending: INTERNAL MEDICINE
Payer: MEDICAID

## 2023-12-07 PROCEDURE — 97112 NEUROMUSCULAR REEDUCATION: CPT

## 2023-12-07 NOTE — PATIENT INSTRUCTIONS
Access Code: [email or web address]  URL: [email or web address]  Date: 12/07/2023  Prepared by: Mary Greco    Exercises  - Stand with eyes closed- grounding  - 1 x daily - 7 x weekly - 2 sets - 30 second hold  - Single Leg Balance  - 1 x daily - 7 x weekly - 2 sets - 30 sec hold  - Standing Gaze Stabilization with Head Rotation  - 2 x daily - 7 x weekly - 2 sets - 30 sec hold  - Walking with Head Rotation  - 1 x daily - 7 x weekly - 2 sets - 5 reps  - Standing Diagonal Head Turns  - 1 x daily - 7 x weekly - 5 reps  - All fours- knee hover  - 1 x daily - 7 x weekly - 2 sets - 10 reps - 5 second hold  - Bird Dog  - 1 x daily - 7 x weekly - 2 sets - 5 reps - 3-5 seconds hold

## 2023-12-07 NOTE — PROGRESS NOTES
Date  12/7/23           Visit Number  2/8           NMR x           Ther EX            Ther Act            Gait Training            CRM             Manual              Dx: L vestibular neuritis, PPPD           Insurance:  [facility]    Visit # authorized:  8 visits until 1/16  Referring MD: Waylon Taylor   Precautions: fall risk  Medication Changes since last visit?: No  Subjective: Pt. Reports that she has been doing her exercises,   Current 4/10, worst 8/10 at a busy social function. Pt. Reports that the nausea along with dizziness is worse, and also makes her more anxious. Pt. Reports that she has been working on managing anxiety, which has been helping. Objective:   Neuromuscular re-education: 40 min  VOR cx with fan- horiz and then vertical, 10 reps (3/10)  Diagonal head turns standing each dir x 5 ea (3/10)  VOR x 1 standing horiz and vertical 2 x 30 sec ea (4/10)  Gait with horizontal and vertical head turns, 2 x 40 ft ea  SLS static multiple attempts up to 20 sec ea LE      Patient Education: Discussed pacing and rationale for habituation. Issued revised written HEP. See pt. Instructions section for details. Assessment: Pt. Tolerated well, able to pace during the session in order to keep dizziness no higher than 4/10. Pt.  Tends to move quickly which       Plan for next few sessions: SLS with 3 kicks, wall slides, standing trunk rotation with visual focus    Charges: NMR x 3       Total Timed Treatment: 40 min  Total Treatment Time: 40 min

## 2023-12-14 ENCOUNTER — OFFICE VISIT (OUTPATIENT)
Dept: PHYSICAL THERAPY | Facility: HOSPITAL | Age: 58
End: 2023-12-14
Attending: INTERNAL MEDICINE
Payer: MEDICAID

## 2023-12-14 PROCEDURE — 97112 NEUROMUSCULAR REEDUCATION: CPT

## 2023-12-14 NOTE — PROGRESS NOTES
Date  12/7/23 12/14/23          Visit Number  2/8 3/8          NMR x x          Ther EX            Ther Act            Gait Training            CRM             Manual              Dx: L vestibular neuritis, PPPD           Insurance:  [facility]    Visit # authorized:  8 visits until 1/16  Referring MD: Waylon Bach   Precautions: fall risk  Medication Changes since last visit?: No  Subjective: Pt. Reports that she has been doing her exercises, had been shopping and doing some cleaning of cabinets yesterday, challenging but tried to pace herself and do grounding. Pt. Reports that she has a better understanding of how she can control her symptoms. Current 2/10, worst 7/10 while shopping. Pt. Reports that the nausea is a little better. Objective:   Neuromuscular re-education: 40 min  Standing EC diagonal weightshift x 10  SLS with 3 kicks x 10 ea  Wall slides x 10, 10 sec hold- emphasis on core stabilization on wall, push through heels  Standing trunk rotation with checkerboard- focus on numbers x 7 ea way (6/10)      Patient Education:  Issued revised written HEP. See pt. Instructions section for details. Discussed use of core stability and grounding to improve sense of body awareness and improve symptoms. Assessment: Pt. Tolerated well with sitting rest between tasks for symptom control. Pt. Able to keep dizziness no higher than 6/10.       Plan for next few sessions: Ball toss, VOR cx with gait    Charges: NMR x 3       Total Timed Treatment: 40 min  Total Treatment Time: 40 min

## 2023-12-14 NOTE — PATIENT INSTRUCTIONS
Access Code: [email or web address]  URL: [email or web address]  Date: 12/14/2023  Prepared by: Sylvia Jean    Exercises  - Standing Gaze Stabilization with Head Rotation  - 2 x daily - 7 x weekly - 2 sets - 30 sec hold  - Walking with Head Rotation  - 1 x daily - 7 x weekly - 2 sets - 5 reps  - Standing Diagonal Head Turns  - 1 x daily - 7 x weekly - 5 reps  - All fours- knee hover  - 1 x daily - 7 x weekly - 2 sets - 10 reps - 5 second hold  - Bird Dog  - 1 x daily - 7 x weekly - 2 sets - 5 reps - 3-5 seconds hold  - Wall slide  - 1 x daily - 7 x weekly - 2 sets - 10 reps - 5 sec hold  - Single Leg 3 Way Reach with Support  - 1 x daily - 7 x weekly - 2 sets - 10 reps  - Wide Tandem Stance with Eyes Closed  - 1 x daily - 7 x weekly - 2 sets - 30 second hold

## 2023-12-21 ENCOUNTER — OFFICE VISIT (OUTPATIENT)
Dept: PHYSICAL THERAPY | Facility: HOSPITAL | Age: 58
End: 2023-12-21
Attending: INTERNAL MEDICINE
Payer: MEDICAID

## 2023-12-21 PROCEDURE — 97112 NEUROMUSCULAR REEDUCATION: CPT

## 2023-12-21 NOTE — PROGRESS NOTES
Date  12/7/23 12/14/23 12/21/23         Visit Number  2/8 3/8 4/8         NMR x x x         Ther EX            Ther Act            Gait Training            CRM             Manual              Dx: L vestibular neuritis, PPPD           Insurance:  [facility]    Visit # authorized:  8 visits until 1/16  Referring MD: Waylon Muniz   Precautions: fall risk  Medication Changes since last visit?: No  Subjective: Pt. Reports that she was more dizzy and a little nauseous after session, had to straight to work and felt more dizzy all day. Pt. Has been working more lately. Pt. Reports that she is better than when starting PT for both dizziness and nausea. Current at rest 1/10, with typical movement 4/10, worst 6/10. Objective:   Neuromuscular re-education: 40 min  Standing ball toss (3/10)  Standing EC diagonal weightshift x 5  SLS x 10 sec ea, multiple attempts  VOR cx slow horiz and vert in standing  VOR cx with gait 40 ft x 4 (4/10)  F/B gait with turns 40 ft x 4  Gait with horiz and vert head turns 40 ft  x 4    Patient Education:  Issued revised written HEP. See pt. Instructions section for details. Discussed use of core stability and grounding to improve sense of body awareness and improve symptoms. Assessment: Pt. Tolerated well with standing rest between tasks for symptom control. Pt. Able to keep dizziness no higher than 6/10.       Plan for next few sessions: try VOR x 1 with gait    Charges: NMR x 3       Total Timed Treatment: 40 min  Total Treatment Time: 40 min

## 2024-01-18 ENCOUNTER — APPOINTMENT (OUTPATIENT)
Dept: PHYSICAL THERAPY | Facility: HOSPITAL | Age: 59
End: 2024-01-18
Attending: INTERNAL MEDICINE
Payer: MEDICAID

## 2024-01-25 ENCOUNTER — OFFICE VISIT (OUTPATIENT)
Dept: PHYSICAL THERAPY | Facility: HOSPITAL | Age: 59
End: 2024-01-25
Attending: INTERNAL MEDICINE
Payer: MEDICAID

## 2024-01-25 PROCEDURE — 97112 NEUROMUSCULAR REEDUCATION: CPT

## 2024-01-25 NOTE — PROGRESS NOTES
Date  12/7/23 12/14/23 12/21/23 1/25/24        Visit Number  2/8 3/8 4/8 5/8        NMR x x x x        Ther EX            Ther Act            Gait Training            CRM             Manual              Dx: L vestibular neuritis, PPPD           Insurance:  [facility]    Visit # authorized:  8 visits until 1/16  Referring MD: Waylon Tong   Precautions: fall risk  Medication Changes since last visit?: No  Subjective: Pt. Reports that she was more dizzy the past 2 weeks with increased dizziness with lying down, rolling over in bed.  She notes that the dizziness does not resolve quickly once she lies down.  Current at rest 7/10, worst 8/10. Pt. Reports that she has not been exercising as much since last visit due to travel, holidays, inc. Sxs.   Objective:   Neuromuscular re-education: 40 min  Oculomotor & Vestibular Exam:  Spontaneous Nystagmus: room light: none ;  fixation blocked: none  Smooth Pursuit: Negative  Saccades: Negative  Gaze Evoked Nystagmus:  room light: Negative; fixation blocked: Negative  Head Thrust: Negative  VOR screen:  WNL    VOR Cancellation: Negative   Convergence: Negative  Cover/Uncover:  Negative  Cross Cover:  Negative  Head Shaking Nystagmus: Negative  Dynamic Visual Acuity:  Not Tested    Positional Testing:   [place]-Hallpike: Negative, no symptoms bilaterally  Roll Test ([facility]): Negative, no symptoms    Standing EC Romberg 30 sec   VOR cx slow horiz and vert in standing  VOR x 1 standing horiz and vert x 60 sec ea  F/B gait with turns 40 ft x 4  Gait with horiz and vert head turns 40 ft  x 4      Patient Education:  Issued revised written HEP.  See pt. Instructions section for details.  Pt. Encouraged to continue with HEP in order to improve symptoms.       Assessment: Due to pt's symptom report, an oculomotor exam was performed.  Oculomotor exam is WNL, nothing new since last session. No evidence of BPPV.  Pt. Tolerated well with standing rest between tasks for symptom control.  Pt's  dizziness was 2/10 at end of session.        Plan for next few sessions: try VOR x 1 with gait    Charges: NMR x 3       Total Timed Treatment: 40 min  Total Treatment Time: 40 min

## 2024-01-25 NOTE — PATIENT INSTRUCTIONS
Access Code: [email or web address]  URL: [email or web address]  Date: 01/25/2024  Prepared by: Nely Joy    Exercises  - Standing Gaze Stabilization with Head Rotation  - 2 x daily - 7 x weekly - 2 sets - 30 sec hold  - Standing VOR Cancellation  - 1 x daily - 7 x weekly - 2 sets - 10 reps  - Standing Near Stance in Corner with Eyes Closed  - 1 x daily - 7 x weekly - 2 sets - 30 sec hold  - Walking with Head Rotation  - 1 x daily - 7 x weekly - 2 sets - 5 reps  - All fours- knee hover  - 1 x daily - 7 x weekly - 2 sets - 10 reps - 5 second hold  - Bird Dog  - 1 x daily - 7 x weekly - 2 sets - 5 reps - 3-5 seconds hold  - Wall slide  - 1 x daily - 7 x weekly - 2 sets - 10 reps - 5 sec hold  - Single Leg 3 Way Reach with Support  - 1 x daily - 7 x weekly - 2 sets - 10 reps

## 2024-02-01 ENCOUNTER — OFFICE VISIT (OUTPATIENT)
Dept: PHYSICAL THERAPY | Facility: HOSPITAL | Age: 59
End: 2024-02-01
Attending: INTERNAL MEDICINE
Payer: MEDICAID

## 2024-02-01 PROCEDURE — 97112 NEUROMUSCULAR REEDUCATION: CPT

## 2024-02-01 NOTE — PROGRESS NOTES
Date  12/7/23 12/14/23 12/21/23 1/25/24 2/1/24       Visit Number  2/8 3/8 4/8 5/8 6/8       NMR x x x x x       Ther EX            Ther Act            Gait Training            CRM             Manual              Dx: L vestibular neuritis, PPPD           Insurance:  [employer]    Visit # authorized:  8 visits until 1/16  Referring MD: Waylon Tong   Precautions: fall risk  Medication Changes since last visit?: No  Subjective: Pt. Reports that she had a headache and left ear pain, no increased dizziness on Saturday, ear pain and headache persisted, on Monday pt. Had dizziness and nausea, still had headache. Pt. Reports that symptoms gradually improved until today, when she reports feeling much better.  Pt. Reports that she has noticed a difference in left ear but that isn't new since this week, has been present for a couple of months. Current at rest 2/10, worst 8/10. Pt. Reports that she has been exercising as recommended.  Pt. Reports that she has been attempting to manage symptoms using somatosensation.  Objective:   Neuromuscular re-education: 40 min  Standing EC Step stance 30 sec   VOR cx during gait, slow horiz and vert (4/10)  VOR x 1 with gait horiz and vert x 60 sec ea (4/10)  F/B gait with turns 40 ft x 4  Gait with horiz and vert head turns 40 ft  x 4      Patient Education:  Issued revised written HEP.  See pt. Instructions section for details.  Pt. Encouraged to continue with HEP in order to improve symptoms.       Assessment: Pt. Tolerated well with standing rest between tasks for symptom control.  Pt's dizziness was 4/10 at worst, 2/10 at end of session.        Plan for next few sessions: ball toss, tandem gait    Charges: NMR x 3       Total Timed Treatment: 40 min  Total Treatment Time: 40 min

## 2024-02-06 ENCOUNTER — TELEPHONE (OUTPATIENT)
Dept: ORTHOPEDICS CLINIC | Facility: CLINIC | Age: 59
End: 2024-02-06

## 2024-02-06 DIAGNOSIS — M79.671 RIGHT FOOT PAIN: Primary | ICD-10-CM

## 2024-02-08 ENCOUNTER — OFFICE VISIT (OUTPATIENT)
Dept: PHYSICAL THERAPY | Facility: HOSPITAL | Age: 59
End: 2024-02-08
Attending: INTERNAL MEDICINE
Payer: MEDICAID

## 2024-02-08 PROCEDURE — 97116 GAIT TRAINING THERAPY: CPT

## 2024-02-08 PROCEDURE — 97112 NEUROMUSCULAR REEDUCATION: CPT

## 2024-02-08 NOTE — PROGRESS NOTES
Patient Name: Irma Garcia, : 1965, MRN: [id number]   Date:  2024  Referring Physician:  Waylon Tong    Diagnosis: L vestibular neuritis, PPPD    Insurance:  [employer]    Progress Summary    Pt has attended 7 visits in physical therapy.    Progress Note Start Date: 23   End Date: 24    Subjective: Pt. Reports that she is generally improved, less dizziness and nausea, reports increased stress lately which affects symptoms.  Pt. Reports that her balance is a little better, dejuan for being in the dark, turning and head turns with less path deviation.  Turning quickly, rushing, getting into/out of bed, shopping/busy visual environments are still very challenging.  Pt. Reports that she is walking 20 min 2-3x/week for exercise.      Dizziness: Current  4/10 (was 7/10), Best 2/10 (was 0/10), Worst 6/10- brief (was 8/10)    Assessment: Pt. Has met one goal, made progress toward the other 4.  She has made good progress in objective functional mobility and subjective symptoms during functional tasks.  She is still limited by activities that require rapid turning or direction changes, holding objects while negotiating stairs, climbing ladders and complex visual environments. Grocery shopping and busy public places are challenging as well. She shows impairment in sensory organization, postural control and eye/head coordination.   Pt. Has been compliant with all recommendations and exercises.  She requires further skilled PT to address her impairments and meet goals as stated.      Objective:   Gait trainin min  Functional Gait Assessment   Item Description Score (0 worst, 3 best)    ___3____1. Gait Level Surface-  Time: ____5.2______seconds  ___3____2. Change in gait speed  ___2____3. Gait with horizontal head turns   ___2____4. Gait with vertical head turns  ___3____5. Gait and pivot turn  ___3____6. Step over obstacle  ___2____7. Gait with narrow base of support-  # of  steps___8_____  ___2____8. Gait with eyes closed-  Time: ___7______seconds  ___3___9. Ambulating backward  ___3____10. Steps    TOTAL SCORE: ____26__/30  (was 20/30)   (less than 22/30 = fall risk)    Gait with horiz and vert head turns 40 ft x 2  F/B gait with turns 40 ft x 4    Neuromuscular re-education: 15 min  Romberg: EO 30 sec, EC 30 sec with increased sway, reports dizziness 3/10  Romberg on Foam: EO 30 sec, EC unable  Tandem Stance: 30 sec (was 10 sec) ea foot forward- increased challenge  SLS: R EO 10 sec, EC 0 sec; L EO 10 sec, EC 0 sec        Goals    1.  Improved Functional Gait Assessment score to at least 27/30 to reflect an improvement in gait and functional balance.  (PROGRESSING TOWARD GOAL)  2.  Pt. Able to  10 objects from the floor without loss of balance or dizziness in order to improve function for household chores and gardening. (PROGRESSING TOWARD GOAL)  3. Indep on stairs with reciprocal pattern, carrying 15 lbs, in order to improve functional mobility for household chores.  (PROGRESSING TOWARD GOAL)  4.  Indep climbing up/down 6 ft ladder without dizziness. (PROGRESSING TOWARD GOAL)  5.  Indep gait on even and uneven surfaces including hills for up to 30 min in order to return to community gait and walking for exercise.  (GOAL MET)         Rehab Potential: good    Plan: Continue skilled Physical Therapy 1-2 x/week or a total of 8 visits over a 90 day period. Treatment will include: balance and gait training, functional training, eye/head coordination, habituation training, sensory organization training, pt. Education, home exercise program development and instruction.       Patient was advised of these findings, precautions, and treatment options and has agreed to actively participate in planning and for this course of care.    Charges: NMR x 3       Total Timed Treatment: 45 min  Total Treatment Time: 45 min    Thank you for your referral. If you have any questions, please contact  me at [phone].    Sincerely,  Nely Joy PT, NCS    Electronically signed by therapist: Nely Joy PT, JULIO    Physician's certification required: Yes  I certify the need for these services furnished under this plan of treatment and while under my care.    X___________________________________________________ Date____________________    Certification From: 2/8/2024  To:5/8/2024

## 2024-02-13 ENCOUNTER — HOSPITAL ENCOUNTER (OUTPATIENT)
Dept: GENERAL RADIOLOGY | Age: 59
Discharge: HOME | End: 2024-02-13
Attending: PODIATRIST
Payer: MEDICAID

## 2024-02-13 ENCOUNTER — OFFICE VISIT (OUTPATIENT)
Dept: ORTHOPEDICS CLINIC | Facility: CLINIC | Age: 59
End: 2024-02-13
Payer: MEDICAID

## 2024-02-13 VITALS — BODY MASS INDEX: 22.46 KG/M2 | HEIGHT: 58 IN | WEIGHT: 107 LBS

## 2024-02-13 DIAGNOSIS — F17.200 TOBACCO DEPENDENCE: ICD-10-CM

## 2024-02-13 DIAGNOSIS — M79.671 RIGHT FOOT PAIN: ICD-10-CM

## 2024-02-13 DIAGNOSIS — G57.61 MORTON'S NEUROMA OF RIGHT FOOT: Primary | ICD-10-CM

## 2024-02-13 DIAGNOSIS — L90.9 FAT PAD ATROPHY OF FOOT: ICD-10-CM

## 2024-02-13 PROCEDURE — 73630 X-RAY EXAM OF FOOT: CPT | Performed by: PODIATRIST

## 2024-02-13 PROCEDURE — 99214 OFFICE O/P EST MOD 30 MIN: CPT | Performed by: PODIATRIST

## 2024-02-13 NOTE — PROGRESS NOTES
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  She stands long hours and she has tried various shoes.  She gets some burning she gets pain across the ball of the foot.        Objective:     Neutral to flexible flatfoot upon stance.  Thinning of the metatarsal fat pad.  Right foot pain third interspace with mild clicking in the second and third interspace  Loss of sensation interdigitally third and fourth digits and at the tips.  No swelling no warmth.  Palpable pedal pulses        Imaging: X-rays 3 views show no bony abnormalities with the exception of a small spur posterior calcaneus        Assessment/Plan:     Diagnoses and all orders for this visit:    Tanner's neuroma of right foot  -     US FOOT RIGHT COMPLETE (CPT=76881); Future    Tobacco dependence    Fat pad atrophy of foot        She has had 3 injections prior about 2 years ago and has had some recurrence of her symptoms.  At this point I think a soft tissue ultrasound study would be helpful    US ordered for hospital  Rule out neuroma vs intermetatarsal bursitis    Can continue w injection  Can consider surgery for excision of neuroma    Discussed shoe types, , arch supports, metatarsal padding    Follow up after US study            Roseline Roberto DPM  [facility]    HeadCount speech recognition software was used to prepare this note. If a word or phrase is confusing, it is likely do to a failure of recognition. Please contact me with any questions or clarifications.

## 2024-02-19 ENCOUNTER — TELEPHONE (OUTPATIENT)
Dept: INTERNAL MEDICINE CLINIC | Facility: CLINIC | Age: 59
End: 2024-02-19

## 2024-02-19 NOTE — TELEPHONE ENCOUNTER
Called and spoke pt. Per pt, there are a few topics she wanted to address with AD. The main thing she wanted to mention is numbness in her lips she has been having intermittently for 10 mos. Initially she thought it may have been because of some lip filler she got, but still occurring intermittently. Some other things she wanted to discuss was vertigo she has been having along with ear pain. Pt said she was just about to call ENT as well to set up appt. Informed her we can keep appt tomorrow, but may not be able to address all of her concerns in this appt given only 20 mins. AD can address most pressing concern, but may need to come back for add'l visit for further eval of other issues. Also agreed with pt setting up appt with ENT would be beneficial. Pt stated understanding and agreed to plan, she plans to discuss lip numbness with AD at visit.     TONY HANDY

## 2024-02-19 NOTE — TELEPHONE ENCOUNTER
Pt sent my chart message with following symptoms..  Message    Appointment For: Irma Garcia ([id number])   Visit Type: MYCHART EXAM (2964)      2/20/2024    4:20 PM  20 mins.  Waylon Tong               [facility] [address]      Patient Comments:   Headaches, sluggish, dizziness

## 2024-02-20 ENCOUNTER — OFFICE VISIT (OUTPATIENT)
Dept: INTERNAL MEDICINE CLINIC | Facility: CLINIC | Age: 59
End: 2024-02-20
Payer: MEDICAID

## 2024-02-20 VITALS
TEMPERATURE: 99 F | HEART RATE: 76 BPM | BODY MASS INDEX: 23.05 KG/M2 | OXYGEN SATURATION: 98 % | SYSTOLIC BLOOD PRESSURE: 108 MMHG | HEIGHT: 58 IN | RESPIRATION RATE: 16 BRPM | WEIGHT: 109.81 LBS | DIASTOLIC BLOOD PRESSURE: 60 MMHG

## 2024-02-20 DIAGNOSIS — H91.92 HEARING LOSS OF LEFT EAR, UNSPECIFIED HEARING LOSS TYPE: ICD-10-CM

## 2024-02-20 DIAGNOSIS — R42 DIZZINESS: Primary | ICD-10-CM

## 2024-02-20 DIAGNOSIS — H81.10 BENIGN PAROXYSMAL POSITIONAL VERTIGO, UNSPECIFIED LATERALITY: ICD-10-CM

## 2024-02-20 DIAGNOSIS — R20.0 PERIORAL NUMBNESS: ICD-10-CM

## 2024-02-20 PROCEDURE — 99215 OFFICE O/P EST HI 40 MIN: CPT | Performed by: INTERNAL MEDICINE

## 2024-02-20 NOTE — PROGRESS NOTES
Irma Garcia  5/5/1965    Chief Complaint   Patient presents with    Follow - Up     Rm 6        SUBJECTIVE   Irma Garcia is a 58 year old female who presents as a follow-up.    The patient has a long-standing history of dizziness secondary to vertigo and vestibular neuritis for which she has undergone vestibular rehabilitation therapy offering improvement.  She also does infrequently use meclizine therapy, for which she is also favorably responsive.  The symptoms are typically predictable, and generally triggered by rapid position changes.  However, the symptoms have been more persistent recently and more severe in intensity, and less associated with movement and more commonly spontaneous in onset.  She also reports a 10-month duration of a perioral numbness and tingling sensation that has gradually worsened over the last couple of weeks.  Yesterday she experienced worsening of chronic headaches, and a several minute duration of blurry vision and sensation of imbalance, giving her the sensation of \"being drunk\".  Her symptom onset and resolution was spontaneous and ultimately self-limiting with sleep.  Upon awakening this morning her symptoms have improved, however are all still present at a less in intensity.  She also reports a gradual left hearing loss, the symptoms of which were stable for several years, and more recently over the last couple weeks has drastically worsened.  She is scheduled with the ENT service for further evaluation.    Review of Systems   No f/c/chest pain or sob. No cough. No abd pain/n/v/d. No other complaints today.    Current Outpatient Medications   Medication Sig Dispense Refill    buPROPion  MG Oral Tablet 12 Hr Take 1 tablet (150 mg total) by mouth daily. 90 tablet 1    VITAMIN B COMPLEX-C OR Take by mouth.      Cholecalciferol (VITAMIN D-3) 5000 UNITS Oral Tab Take 1 tablet (5,000 Units total) by mouth.      ALPRAZOLAM 0.25 MG Oral Tab TAKE 1 TABLET BY MOUTH DAILY AS  NEEDED (Patient not taking: Reported on 2024) 14 tablet 0      Allergies   Allergen Reactions    Flagyl [Metronidazole Hcl] HIVES and SWELLING      Past Medical History:   Diagnosis Date    Abnormal maternal glucose tolerance, complicating pregnancy, childbirth, or the puerperium, unspecified as to episode of care     Acute upper respiratory infections of unspecified site     Allergic rhinitis, cause unspecified     Anxiety     Carpal tunnel syndrome     HERNAN (obstructive sleep apnea) 3/15/22-[facility]    AHI-5    Other B-complex deficiencies     Unspecified vitamin D deficiency       Patient Active Problem List   Diagnosis    Attention deficit hyperactivity disorder (ADHD)    Mood disorder in conditions classified elsewhere    ADD (attention deficit disorder)    History of adenomatous polyp of colon    Benign colon polyp    Vestibular neuritis, unspecified laterality    Depressed mood    Depression with anxiety    Lipoma of back    Neuroma    Hammer toe of right foot    Bilateral tinnitus    Left carpal tunnel syndrome    Pure hypercholesterolemia    Tobacco dependence    HERNAN (obstructive sleep apnea)      Past Surgical History:   Procedure Laterality Date    COLONOSCOPY  6/10/16    D & C  99    ENDOMETRIAL ABLATION      TONSILLECTOMY        Social History     Socioeconomic History    Marital status:    Tobacco Use    Smoking status: Former     Packs/day: 0.30     Years: 9.00     Additional pack years: 0.00     Total pack years: 2.70     Types: Cigarettes     Quit date:      Years since quittin.1    Smokeless tobacco: Never   Vaping Use    Vaping Use: Never used   Substance and Sexual Activity    Alcohol use: Yes     Alcohol/week: 3.0 standard drinks of alcohol     Types: 3 Standard drinks or equivalent per week    Drug use: Yes     Types: Cannabis     Comment: 1-2 times monthly; edibles/smoking   Other Topics Concern    Caffeine Concern Yes    Exercise Yes         OBJECTIVE:   BP  108/60 (BP Location: Left arm, Patient Position: Sitting, Cuff Size: adult)   Pulse 76   Temp 98.6 °F (37 °C) (Skin)   Resp 16   Ht 4' 10\" (1.473 m)   Wt 109 lb 12.8 oz (49.8 kg)   SpO2 98%   BMI 22.95 kg/m²   Constitutional: Oriented to person, place, and time. No distress.   HEENT:  Normocephalic and atraumatic.TM's wnl.  Nose normal. Oropharynx is clear and moist.   Eyes: Conjunctivae wnl.   Cardiovascular: Normal rate, regular rhythm and intact distal pulses.  No murmur, rubs or gallops.   Pulmonary/Chest: Effort normal and breath sounds normal. No respiratory distress.  Abdominal: Soft. Bowel sounds are normal. Non tender, no masses, no organomegaly or hernias.  Musculoskeletal: No edema  Neurological: Bilateral upper and lower extremity motor 5 out of 5 proximal and distal with sensation intact.  EOMI.  PERRLA.  Finger-to-nose without dysmetria bilaterally.  Gait is normal.  AO x 3.  Skin: Skin is warm and dry. No rash.  Psychiatric: Normal mood and affect.     ASSESSMENT AND PLAN:   Irma Garcia is a 58 year old female who presents as a follow-up.    Despite the lack of focal neurological deficits on examination, in light of the several acute on chronic neurological symptoms and the intensity of presenting symptoms, an MRI of the brain has been ordered.  She does have baseline dizziness secondary to BPPV and vestibular neuritis, however current symptoms do not reflect her chronic symptoms and are not consistently triggered by changes in movement.  She also has a longstanding history of perioral numbness, however with recent drastic worsening.  In addition to imaging, CMP, TSH, CBC, CMP, and vitamin B12 levels have been ordered.    Total time spent: 40 minutes    The patient indicates understanding of these issues and agrees to the plan.  TODAY'S ORDERS     No orders of the defined types were placed in this encounter.      Meds & Refills:  Requested Prescriptions      No prescriptions requested or  ordered in this encounter       Imaging & Consults:  MRI BRAIN (CPT=70551)    No follow-ups on file.  There are no Patient Instructions on file for this visit.    All questions were answered and the patient agrees with the plan.     Thank you,  Waylon Tong MD

## 2024-02-21 ENCOUNTER — OFFICE VISIT (OUTPATIENT)
Dept: PHYSICAL THERAPY | Facility: HOSPITAL | Age: 59
End: 2024-02-21
Attending: INTERNAL MEDICINE
Payer: MEDICAID

## 2024-02-21 PROCEDURE — 97112 NEUROMUSCULAR REEDUCATION: CPT

## 2024-02-21 NOTE — PROGRESS NOTES
Date  2/21/24           Visit Number  8/12           NMR x           Ther EX            Ther Act            Gait Training            CRM             Manual              Dx: L vestibular neuritis, PPPD           Insurance:  [employer]    Visit # authorized:  8 visits until 1/16  Referring MD: Waylon Tong   Precautions: fall risk  Medication Changes since last visit?: No  Subjective: Pt. Reports that 3 weeks ago had increase in left ear/head symptoms intermittently, pressure and pain.  Pt. Reports increased disabling headaches and exacerbated dizziness in past two weeks, photophobia, difficulty concentrating, difficulty focusing visually.  Pt. Reports difficulty sleeping around the time of her headaches.  Current headache 4/10, dizziness 3/10, up to 9/10- during migraine and trying to work.  Pt. Reports that she stopped smoking over the past few months, quitting completely recently.  Objective:   Neuromuscular re-education: 40 min  Standing EC Step stance 30 sec   VOR cx during gait, slow horiz and vert (4/10)  VOR x 1 with gait horiz and vert x 60 sec ea (4/10)  F/B gait with turns 40 ft x 4  Gait with horiz and vert head turns 40 ft  x 4      Patient Education:  Issued revised written HEP.  See pt. Instructions section for details.  Pt. Encouraged to continue with HEP in order to improve symptoms. Pt. Encouraged to contact MD re: migraine management.         Assessment: Pt. Tolerated well with standing rest between tasks for symptom control.  Pt. Was tearful during session due to increase in symptoms and increased frequency of migraines.  Pt's dizziness was 4/10 at worst, 2/10 at end of session.  Pts increased migraine and dizziness likely due to disrupted due to nicotine withdrawal and limited sleep.      Plan for next few sessions: ball toss, tandem gait    Charges: NMR x 3       Total Timed Treatment: 40 min  Total Treatment Time: 40 min

## 2024-02-22 LAB
ABSOLUTE BASOPHILS: 41 CELLS/UL (ref 0–200)
ABSOLUTE EOSINOPHILS: 77 CELLS/UL (ref 15–500)
ABSOLUTE LYMPHOCYTES: 1988 CELLS/UL (ref 850–3900)
ABSOLUTE MONOCYTES: 490 CELLS/UL (ref 200–950)
ABSOLUTE NEUTROPHILS: 3304 CELLS/UL (ref 1500–7800)
ALBUMIN/GLOBULIN RATIO: 1.8 (CALC) (ref 1–2.5)
ALBUMIN: 4.6 G/DL (ref 3.6–5.1)
ALKALINE PHOSPHATASE: 68 U/L (ref 37–153)
ALT: 11 U/L (ref 6–29)
AST: 17 U/L (ref 10–35)
BASOPHILS: 0.7 %
BILIRUBIN, TOTAL: 0.4 MG/DL (ref 0.2–1.2)
BUN: 15 MG/DL (ref 7–25)
CALCIUM: 9.6 MG/DL (ref 8.6–10.4)
CARBON DIOXIDE: 32 MMOL/L (ref 20–32)
CHLORIDE: 100 MMOL/L (ref 98–110)
CREATININE: 0.82 MG/DL (ref 0.5–1.03)
EGFR: 83 ML/MIN/1.73M2
EOSINOPHILS: 1.3 %
GLOBULIN: 2.6 G/DL (CALC) (ref 1.9–3.7)
GLUCOSE: 87 MG/DL (ref 65–99)
HEMATOCRIT: 42 % (ref 35–45)
HEMOGLOBIN: 14.2 G/DL (ref 11.7–15.5)
LYMPHOCYTES: 33.7 %
MCH: 30.1 PG (ref 27–33)
MCHC: 33.8 G/DL (ref 32–36)
MCV: 89.2 FL (ref 80–100)
MONOCYTES: 8.3 %
MPV: 10.8 FL (ref 7.5–12.5)
NEUTROPHILS: 56 %
PLATELET COUNT: 245 THOUSAND/UL (ref 140–400)
POTASSIUM: 4.3 MMOL/L (ref 3.5–5.3)
PROTEIN, TOTAL: 7.2 G/DL (ref 6.1–8.1)
RDW: 12 % (ref 11–15)
RED BLOOD CELL COUNT: 4.71 MILLION/UL (ref 3.8–5.1)
SODIUM: 139 MMOL/L (ref 135–146)
TSH W/REFLEX TO FT4: 1.84 MIU/L (ref 0.4–4.5)
VITAMIN B12: 590 PG/ML (ref 200–1100)
WHITE BLOOD CELL COUNT: 5.9 THOUSAND/UL (ref 3.8–10.8)

## 2024-02-27 ENCOUNTER — OFFICE VISIT (OUTPATIENT)
Dept: PHYSICAL THERAPY | Facility: HOSPITAL | Age: 59
End: 2024-02-27
Attending: INTERNAL MEDICINE
Payer: MEDICAID

## 2024-02-27 PROCEDURE — 97112 NEUROMUSCULAR REEDUCATION: CPT

## 2024-02-27 NOTE — PROGRESS NOTES
Date  2/21/24 2/27/24          Visit Number  8/12 9/12          NMR x x          Ther EX            Ther Act            Gait Training            CRM             Manual              Dx: L vestibular neuritis, PPPD           Insurance:  [employer]    Visit # authorized:  8 visits until 1/16  Referring MD: Waylon Tong   Precautions: fall risk  Medication Changes since last visit?: No  Subjective: Pt. Reports that she has had some increase in dizziness, difficulty focusing, 4/10 currently.  Pt. Reports headache both days over weekend, dull ache, dizzy and nausea.  Headache has resolved but dizziness still present.  Pt. Has not smoked in 2 weeks.  Pt. Was traveling over weekend so limited in ability to exercise but did walk a lot and was intentional about visual scanning.  Pt. Reports that she contacted MD re: migraine  and anxiety management.    Objective:   Neuromuscular re-education: 40 min  Standing EC Step stance 30 sec   VOR cx during gait, slow horiz and vert (4/10)  VOR x 1 with gait horiz and vert x 60 sec ea (4/10)  F/B gait with turns 40 ft x 4  Gait with horiz and vert head turns 40 ft  x 4  Ball toss hand to hand with gait 40 ft x 2 (4/10)  Tandem gait F/B 2 x 20 ea way    Patient Education:  Issued revised written HEP.  See pt. Instructions section for details.  Pt. Encouraged to continue with HEP in order to improve symptoms.        Assessment: Pt. Tolerated well with standing rest between tasks for symptom control.  Pt's dizziness was 4/10 at worst, 2/10 at end of session.     Plan for next few sessions:  rebounder, revisit core strengthening.    Charges: NMR x 3       Total Timed Treatment: 40 min  Total Treatment Time: 40 min

## 2024-02-28 ENCOUNTER — HOSPITAL ENCOUNTER (OUTPATIENT)
Dept: MAMMOGRAPHY | Age: 59
Discharge: HOME | End: 2024-02-28
Attending: INTERNAL MEDICINE
Payer: MEDICAID

## 2024-02-28 DIAGNOSIS — Z12.31 ENCOUNTER FOR SCREENING MAMMOGRAM FOR MALIGNANT NEOPLASM OF BREAST: ICD-10-CM

## 2024-02-28 PROCEDURE — 77067 SCR MAMMO BI INCL CAD: CPT | Performed by: INTERNAL MEDICINE

## 2024-02-28 PROCEDURE — 77063 BREAST TOMOSYNTHESIS BI: CPT | Performed by: INTERNAL MEDICINE

## 2024-03-05 ENCOUNTER — OFFICE VISIT (OUTPATIENT)
Dept: PHYSICAL THERAPY | Facility: HOSPITAL | Age: 59
End: 2024-03-05
Attending: INTERNAL MEDICINE
Payer: MEDICAID

## 2024-03-05 PROCEDURE — 97112 NEUROMUSCULAR REEDUCATION: CPT

## 2024-03-05 NOTE — PROGRESS NOTES
Date  2/21/24 2/27/24 3/5/24         Visit Number  8/12 9/12 10/12         NMR x x x         Ther EX            Ther Act            Gait Training            CRM             Manual              Dx: L vestibular neuritis, PPPD           Insurance:  [employer]    Visit # authorized:  8 visits until 1/16  Referring MD: Waylon Tong   Precautions: fall risk  Medication Changes since last visit?: No  Subjective: Pt. Reports that she has had some increase in head pain, dizziness, difficulty focusing, 4/10 currently.  Pt. Reports that she has had continued dull headache, dizzy and nausea.  She has continued to have a sense that she cannot focus her vision well.  Pt. Reports fatigue as well, with increased effort to try to work.  Pt. Has not smoked in over 2 weeks.  Pt. Tried to exercise over weekend- walked on treadmill x 10 min, did core exercises and  yoga. Pt. Reports that it felt good and will continue.  Pt. Reports that she has imitrex and xanax, has not initiated yet, is unsure when to take them.  Objective:   Neuromuscular re-education: 40 min  Standing EC Step stance 2 x 30 sec   High plank 3 x 10 sec  Open book with VOR cx x 5 ea dir  Cat cow x 5  Quadruped alt ext x 5 ea side  F/B gait with turns 40 ft x 4  Gait with horiz and vert head turns 40 ft  x 4  Tandem gait F/B 2 x 20 ea way (improved after cues for increased core stability)    Patient Education:  Issued revised written HEP.  See pt. Instructions section for details.  Pt. Encouraged to have eye exam to ensure that pt's vision is optimally corrected.    Assessment: Pt. Tolerated well with standing rest between tasks for symptom control.  Pt's dizziness was 4/10 at onset of session, no change by end of session.    Plan for next few sessions:  rebounder, revisit convergence?    Charges: NMR x 3       Total Timed Treatment: 40 min  Total Treatment Time: 40 min

## 2024-03-05 NOTE — PATIENT INSTRUCTIONS
Access Code: [email or web address]  URL: [email or web address]  Date: 03/05/2024  Prepared by: Nely Joy    Exercises  - Open book  - 1 x daily - 7 x weekly - 2 sets - 10 reps  - Cat Cow  - 1 x daily - 7 x weekly - 2 sets - 10 reps  - Plank on Hands  - 1 x daily - 7 x weekly - 3 sets - 10 hold  - Bird Dog  - 1 x daily - 7 x weekly - 2 sets - 5 reps - 3-5 seconds hold  - Wall slide  - 1 x daily - 7 x weekly - 2 sets - 10 reps - 5 sec hold  - Wide Tandem Stance with Eyes Closed  - 1 x daily - 7 x weekly - 2 sets - 30 second hold  - Tandem walking forward and back  - 1 x daily - 7 x weekly - 2 sets - 10 reps  - Ball Toss with Eye Tracking While Walking  - 1 x daily - 7 x weekly - 2 sets - 10 reps  - Walking Gaze Stabilization Head Rotation  - 1 x daily - 7 x weekly - 60 seconds hold

## 2024-03-07 RX ORDER — BUPROPION HYDROCHLORIDE 150 MG/1
150 TABLET, EXTENDED RELEASE ORAL DAILY
Qty: 90 TABLET | Refills: 1 | Status: SHIPPED | OUTPATIENT
Start: 2024-03-07

## 2024-03-14 ENCOUNTER — TELEPHONE (OUTPATIENT)
Dept: INTERNAL MEDICINE CLINIC | Facility: CLINIC | Age: 59
End: 2024-03-14

## 2024-03-14 ENCOUNTER — HOSPITAL ENCOUNTER (OUTPATIENT)
Dept: MRI IMAGING | Age: 59
Discharge: HOME | End: 2024-03-14
Attending: INTERNAL MEDICINE
Payer: MEDICAID

## 2024-03-14 DIAGNOSIS — G93.89 BRAIN MASS: Primary | ICD-10-CM

## 2024-03-14 DIAGNOSIS — R42 DIZZINESS: ICD-10-CM

## 2024-03-14 DIAGNOSIS — R20.0 PERIORAL NUMBNESS: ICD-10-CM

## 2024-03-14 DIAGNOSIS — H81.10 BENIGN PAROXYSMAL POSITIONAL VERTIGO, UNSPECIFIED LATERALITY: ICD-10-CM

## 2024-03-14 PROCEDURE — 70551 MRI BRAIN STEM W/O DYE: CPT | Performed by: INTERNAL MEDICINE

## 2024-03-14 NOTE — TELEPHONE ENCOUNTER
Spoke with radiologist Dr. Akbar Thao. Stated there is brain mass noted in the brain stem, about 3 cm. His recommendation is for MRI brain w + w/o contrast and referral to neurosurgery. Will have his dx in report.     Impression   CONCLUSION:  1. Circumscribed 3.2 x 2.6 x 2.5 cm presumably partially cystic/solid intra-axial mass arising from the left hemipons/left middle cerebellar peduncle.  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  Recommend  correlation with a pre/post contrast MRI of the brain to confirm the presence of a solid component.  Neurosurgical consultation is also recommended.  2. Aforementioned posterior fossa mass demonstrates mild surrounding vasogenic edema and results in mass effect on the 4th ventricle without obstructive hydrocephalus or downward herniation of the cerebellar tonsils through the foramen magnum.          Pasted impression above. Routing to AD

## 2024-03-14 NOTE — TELEPHONE ENCOUNTER
DR. Thao from [facility] on back line to give urgen MRI brain results on this patient-sent call to triage

## 2024-03-15 ENCOUNTER — TELEPHONE (OUTPATIENT)
Dept: INTERNAL MEDICINE CLINIC | Facility: CLINIC | Age: 59
End: 2024-03-15

## 2024-03-15 NOTE — TELEPHONE ENCOUNTER
Called central scheduling to place a stat MRI per Dr. Tong. I am unable to answer any questions since patient is at home and not in office. Per central scheduling they will give pt a call to answer all questions. Information provided is pts , first, last name, height and cell phone.

## 2024-03-18 ENCOUNTER — TELEPHONE (OUTPATIENT)
Dept: INTERNAL MEDICINE CLINIC | Facility: CLINIC | Age: 59
End: 2024-03-18

## 2024-03-18 ENCOUNTER — HOSPITAL ENCOUNTER (OUTPATIENT)
Dept: MRI IMAGING | Facility: HOSPITAL | Age: 59
Discharge: HOME | End: 2024-03-18
Attending: INTERNAL MEDICINE
Payer: MEDICAID

## 2024-03-18 DIAGNOSIS — G93.89 BRAIN MASS: ICD-10-CM

## 2024-03-18 PROCEDURE — A9575 INJ GADOTERATE MEGLUMI 0.1ML: HCPCS | Performed by: INTERNAL MEDICINE

## 2024-03-18 PROCEDURE — 70553 MRI BRAIN STEM W/O & W/DYE: CPT | Performed by: INTERNAL MEDICINE

## 2024-03-18 RX ORDER — BUPROPION HYDROCHLORIDE 150 MG/1
150 TABLET, EXTENDED RELEASE ORAL DAILY
Qty: 90 TABLET | Refills: 1 | Status: SHIPPED | OUTPATIENT
Start: 2024-03-18

## 2024-03-18 RX ORDER — GADOTERATE MEGLUMINE 376.9 MG/ML
20 INJECTION INTRAVENOUS
Status: COMPLETED | OUTPATIENT
Start: 2024-03-18 | End: 2024-03-18

## 2024-03-18 RX ADMIN — GADOTERATE MEGLUMINE 20 ML: 376.9 INJECTION INTRAVENOUS at 08:49:00

## 2024-03-18 NOTE — TELEPHONE ENCOUNTER
Pt requesting medication to be sent to a different pharmacy. Pharmacy updated.   Routed to Dr. Tong for approval.

## 2024-03-19 ENCOUNTER — OFFICE VISIT (OUTPATIENT)
Dept: NEUROLOGY | Facility: CLINIC | Age: 59
End: 2024-03-19
Payer: MEDICAID

## 2024-03-19 ENCOUNTER — NURSE ONLY (OUTPATIENT)
Dept: HEMATOLOGY/ONCOLOGY | Facility: HOSPITAL | Age: 59
End: 2024-03-19
Attending: NEUROLOGICAL SURGERY
Payer: MEDICAID

## 2024-03-19 VITALS
RESPIRATION RATE: 16 BRPM | WEIGHT: 114 LBS | TEMPERATURE: 97 F | BODY MASS INDEX: 24 KG/M2 | OXYGEN SATURATION: 98 % | HEART RATE: 74 BPM | SYSTOLIC BLOOD PRESSURE: 121 MMHG | DIASTOLIC BLOOD PRESSURE: 83 MMHG

## 2024-03-19 DIAGNOSIS — D33.3 VESTIBULAR SCHWANNOMA (HCC): Primary | ICD-10-CM

## 2024-03-19 PROCEDURE — 99211 OFF/OP EST MAY X REQ PHY/QHP: CPT

## 2024-03-19 PROCEDURE — 99203 OFFICE O/P NEW LOW 30 MIN: CPT | Performed by: NEUROLOGICAL SURGERY

## 2024-03-19 NOTE — H&P (VIEW-ONLY)
[facility] Neurosurgery Consultation  Patient: Irma Garcia  Medical Record Number: [id number]  YOB: 1965  PCP: Waylon Tong MD    Referring Physician: No ref. provider found  Reason for visit: No chief complaint on file.      Dear Dr. Carrizales ref. provider found:  Thank you very much for requesting this consultation. I had the opportunity to evaluate and initiate care for your patient today, as per your request.    HISTORY OF CHIEF COMPLAINT:    Irma Garcia is a very pleasant 58 year old female who presents today for consultation.  Pt states in 2020 she developed vertigo.  She states she was diagnosted with vestibular neuritis.  She had completed PT with some improvement.  Pt has had some numbness of her left lip.  Pt states she attributed this to lip filler.  Pt states she also has had decreased hearing in her left ear.  She attributed this to shingles.  Pt has had ocular migraines which was new for her.  Pt states she had increased numbness of the left side of her face.  She states she has a change in sensation in her mouth.  Pt saw her PCP for this who ordered a MRI.  Pt was referred to neurosurgery for her MRI.  She states she intermittent fasting.  She was a previous smoker but has quit.  Pt states she was told PT initially told her her migraines were due to nicotine withdrawal.       Past Medical History:   Diagnosis Date    Abnormal maternal glucose tolerance, complicating pregnancy, childbirth, or the puerperium, unspecified as to episode of care     Acute upper respiratory infections of unspecified site     Allergic rhinitis, cause unspecified     Anxiety     Carpal tunnel syndrome     HERNAN (obstructive sleep apnea) 3/15/22-[facility]    AHI-5    Other B-complex deficiencies     Unspecified vitamin D deficiency       Past Surgical History:   Procedure Laterality Date    COLONOSCOPY  6/10/16    D & C  1/1/99    ENDOMETRIAL ABLATION      TONSILLECTOMY        Family  ----- Message from Rochelle Rosenberg sent at 2020  3:13 PM CST -----  Contact: self  Afshan De Leon  MRN: [id number]  : 2003  PCP: Whintey Shepherd  Home Phone      [phone]  Work Phone      [phone]  Mobile          [phone]      MESSAGE:   Pt requesting refill or new Rx.   Is this a refill or new RX:  refill  RX name and strength: dextroamphetamine-amphetamine (ADDERALL XR) 20 MG 24 hr capsule  Last office visit: 2020  Is this a 30-day or 90-day RX:  30  Ph[facility] location[facility] [place]  Comments:      Phone:  [phone]       History   Problem Relation Age of Onset    Other (type 2 diabetes) Father       Social History     Socioeconomic History    Marital status:    Tobacco Use    Smoking status: Former     Packs/day: 0.30     Years: 9.00     Additional pack years: 0.00     Total pack years: 2.70     Types: Cigarettes     Quit date:      Years since quittin.2    Smokeless tobacco: Never   Vaping Use    Vaping Use: Never used   Substance and Sexual Activity    Alcohol use: Yes     Alcohol/week: 3.0 standard drinks of alcohol     Types: 3 Standard drinks or equivalent per week    Drug use: Yes     Types: Cannabis     Comment: 1-2 times monthly; edibles/smoking   Other Topics Concern    Caffeine Concern Yes    Exercise Yes      Allergies   Allergen Reactions    Flagyl [Metronidazole Hcl] HIVES and SWELLING      Current Medications:  Current Outpatient Medications   Medication Sig Dispense Refill    buPROPion  MG Oral Tablet 12 Hr Take 1 tablet (150 mg total) by mouth daily. 90 tablet 1    ALPRAZolam 0.25 MG Oral Tab Take 1-2 tablets up to twice daily for anxiety 30 tablet 1    SUMAtriptan (IMITREX) 25 MG Oral Tab Take 1 tablet (25 mg total) by mouth every 2 (two) hours as needed for Migraine. Use at onset; repeat once after 2 HRS-ONLY 2 IN 24 HR MAX 9 tablet 1    ALPRAZOLAM 0.25 MG Oral Tab TAKE 1 TABLET BY MOUTH DAILY AS NEEDED (Patient not taking: Reported on 2024) 14 tablet 0    VITAMIN B COMPLEX-C OR Take by mouth.      Cholecalciferol (VITAMIN D-3) 5000 UNITS Oral Tab Take 1 tablet (5,000 Units total) by mouth.          REVIEW OF SYSTEMS   Comprehensive review of systems done. Negative except what is outlined in the above HPI.     PHYSICAL EXAMINATION    vitals were not taken for this visit.   GENERAL: Very pleasant patient is in no apparent distress. Sitting comfortably in the examination chair.   HEENT: Normocephalic, atraumatic.  RESPIRATORY RATE: Easy and Even   SKIN: Warm and dry  NEURO: Awake, alert and  orientated. Speech fluent, comprehension intact, answering questions appropriately.  Pupils 3 mm and PERRL.  EOMI.  VFF.  Face appears symmetric.  Tongue midline.  Uvula elevates symmetrically.  Shoulder shrug equal bilaterally.  The remainder of CN GI.    SPINE:  Gait/Coordination: Intact, non-antalgic gait. Able to heel and toe balance without difficulty.  Able tandem walk without difficulty.  Negative Romberg.  Dysmetria on the L.    Sensation: Sensory deficits noted on bilateral upper and lower extremities to light touch: None   Upper Extremity Strength:     Deltoid  Biceps  Triceps  W. extension F. extension Thumb adduction Thumb abduction   Intrinsics      Right 5 5 5 5 5 5 5 5 5     Left 5 5 5 5 5 5 5 5 5   Lower Extremity Strength:     Iliopsoas  Hamstrings   Quads    D-flexion P-flexion Great Toe   Right       5         5       5         5 5 5   Left       5         5       5         5 5 5   Negative Lhermittes    DATA:   None    IMAGING:   Mri brain w/ w/o shows left sided likely vestibular schwannoma with cystic component causing shift of the brainstem    MEDICAL DECISION MAKING:     ASSESSMENT and PLAN:  Vestibular schwannoma    PLAN:   1. Medication: None  2. Imaging:    - Reviewed today:    - MRI brain w/ w/o    - Ordered today:    - Discussed surveillance imaging vs surgical resection.  If she chooses to observe, recommend repeat MRI in 3 months  3. Referral: Audiology for hearing exam pre op  4. Dr Liu discussed retrosigmoid craniectomy for surgical resection of the tumor and debulking of the cystic component.  Dr. Liu discussed that the goal of surgery is to decompress the nerve but we cannot guarantee resection would improve her vertigo.  Pt was not recommended for radiosurgery given the size of the tumor.  Discussed that patient has likely had this since 2020 so this is not emergent.  Dr. Liu discussed risks and benefits in detail.  Risks include bleeding, infection,  pseudomeningocele, temporary or permanent damage to the facial nerve acoustic nerve, no improvement in symptoms.  This surgery would be done with neuro monitoring to avoid nerve damage   5. Follow up After audiogram or follow up sooner or go to the ED for any new, worsening or concerning signs or symptoms.  Alternatively pt can call if she would like to proceed with surgery for surgical case request order      Dr. Liu evaluated pt.  I, Ann Bragg, am acting as scribe      Total visit time:  30 minutes  More than 50% spent coordinating care, providing patient education, reviewing imaging and counseling.    Thank you very much for the kind referral.  Respectfully yours,    MATILDE Beltran, MATILDE  [address], [address] [address]  [phone]  3/19/2024 2:13 PM

## 2024-03-19 NOTE — PROGRESS NOTES
Patient here for new consult with Dr Liu. Patient states she has daily headaches that she rates a 5/10 patient takes OTC medications to manage pain. Patient completed her MRI yesterday and last Thursday. Patient states she has ocular migraines that have increased. Patient states she has decreased in hearing on the left side. Patient c/o vertigo and tinnitus. Patient states she has numbness around her mouth. Patient is accompanied by her boyfriend Saturnino.     Education Record    Learner:  Patient and Spouse    Disease / Diagnosis: Neuroma     Barriers / Limitations:  None   Comments:    Method:  Discussion   Comments:    General Topics:  Medication, Pain, Side effects and symptom management, and Plan of care reviewed   Comments:    Outcome:  Shows understanding   Comments:

## 2024-03-19 NOTE — H&P
[facility] Neurosurgery Consultation  Patient: Irma Garcia  Medical Record Number: [id number]  YOB: 1965  PCP: Waylon Tong MD    Referring Physician: No ref. provider found  Reason for visit: No chief complaint on file.      Dear Dr. Carrizales ref. provider found:  Thank you very much for requesting this consultation. I had the opportunity to evaluate and initiate care for your patient today, as per your request.    HISTORY OF CHIEF COMPLAINT:    Irma Garcia is a very pleasant 58 year old female who presents today for consultation.  Pt states in 2020 she developed vertigo.  She states she was diagnosted with vestibular neuritis.  She had completed PT with some improvement.  Pt has had some numbness of her left lip.  Pt states she attributed this to lip filler.  Pt states she also has had decreased hearing in her left ear.  She attributed this to shingles.  Pt has had ocular migraines which was new for her.  Pt states she had increased numbness of the left side of her face.  She states she has a change in sensation in her mouth.  Pt saw her PCP for this who ordered a MRI.  Pt was referred to neurosurgery for her MRI.  She states she intermittent fasting.  She was a previous smoker but has quit.  Pt states she was told PT initially told her her migraines were due to nicotine withdrawal.       Past Medical History:   Diagnosis Date    Abnormal maternal glucose tolerance, complicating pregnancy, childbirth, or the puerperium, unspecified as to episode of care     Acute upper respiratory infections of unspecified site     Allergic rhinitis, cause unspecified     Anxiety     Carpal tunnel syndrome     HERNAN (obstructive sleep apnea) 3/15/22-[facility]    AHI-5    Other B-complex deficiencies     Unspecified vitamin D deficiency       Past Surgical History:   Procedure Laterality Date    COLONOSCOPY  6/10/16    D & C  1/1/99    ENDOMETRIAL ABLATION      TONSILLECTOMY        Family  History   Problem Relation Age of Onset    Other (type 2 diabetes) Father       Social History     Socioeconomic History    Marital status:    Tobacco Use    Smoking status: Former     Packs/day: 0.30     Years: 9.00     Additional pack years: 0.00     Total pack years: 2.70     Types: Cigarettes     Quit date:      Years since quittin.2    Smokeless tobacco: Never   Vaping Use    Vaping Use: Never used   Substance and Sexual Activity    Alcohol use: Yes     Alcohol/week: 3.0 standard drinks of alcohol     Types: 3 Standard drinks or equivalent per week    Drug use: Yes     Types: Cannabis     Comment: 1-2 times monthly; edibles/smoking   Other Topics Concern    Caffeine Concern Yes    Exercise Yes      Allergies   Allergen Reactions    Flagyl [Metronidazole Hcl] HIVES and SWELLING      Current Medications:  Current Outpatient Medications   Medication Sig Dispense Refill    buPROPion  MG Oral Tablet 12 Hr Take 1 tablet (150 mg total) by mouth daily. 90 tablet 1    ALPRAZolam 0.25 MG Oral Tab Take 1-2 tablets up to twice daily for anxiety 30 tablet 1    SUMAtriptan (IMITREX) 25 MG Oral Tab Take 1 tablet (25 mg total) by mouth every 2 (two) hours as needed for Migraine. Use at onset; repeat once after 2 HRS-ONLY 2 IN 24 HR MAX 9 tablet 1    ALPRAZOLAM 0.25 MG Oral Tab TAKE 1 TABLET BY MOUTH DAILY AS NEEDED (Patient not taking: Reported on 2024) 14 tablet 0    VITAMIN B COMPLEX-C OR Take by mouth.      Cholecalciferol (VITAMIN D-3) 5000 UNITS Oral Tab Take 1 tablet (5,000 Units total) by mouth.          REVIEW OF SYSTEMS   Comprehensive review of systems done. Negative except what is outlined in the above HPI.     PHYSICAL EXAMINATION    vitals were not taken for this visit.   GENERAL: Very pleasant patient is in no apparent distress. Sitting comfortably in the examination chair.   HEENT: Normocephalic, atraumatic.  RESPIRATORY RATE: Easy and Even   SKIN: Warm and dry  NEURO: Awake, alert and  orientated. Speech fluent, comprehension intact, answering questions appropriately.  Pupils 3 mm and PERRL.  EOMI.  VFF.  Face appears symmetric.  Tongue midline.  Uvula elevates symmetrically.  Shoulder shrug equal bilaterally.  The remainder of CN GI.    SPINE:  Gait/Coordination: Intact, non-antalgic gait. Able to heel and toe balance without difficulty.  Able tandem walk without difficulty.  Negative Romberg.  Dysmetria on the L.    Sensation: Sensory deficits noted on bilateral upper and lower extremities to light touch: None   Upper Extremity Strength:     Deltoid  Biceps  Triceps  W. extension F. extension Thumb adduction Thumb abduction   Intrinsics      Right 5 5 5 5 5 5 5 5 5     Left 5 5 5 5 5 5 5 5 5   Lower Extremity Strength:     Iliopsoas  Hamstrings   Quads    D-flexion P-flexion Great Toe   Right       5         5       5         5 5 5   Left       5         5       5         5 5 5   Negative Lhermittes    DATA:   None    IMAGING:   Mri brain w/ w/o shows left sided likely vestibular schwannoma with cystic component causing shift of the brainstem    MEDICAL DECISION MAKING:     ASSESSMENT and PLAN:  Vestibular schwannoma    PLAN:   1. Medication: None  2. Imaging:    - Reviewed today:    - MRI brain w/ w/o    - Ordered today:    - Discussed surveillance imaging vs surgical resection.  If she chooses to observe, recommend repeat MRI in 3 months  3. Referral: Audiology for hearing exam pre op  4. Dr Liu discussed retrosigmoid craniectomy for surgical resection of the tumor and debulking of the cystic component.  Dr. Liu discussed that the goal of surgery is to decompress the nerve but we cannot guarantee resection would improve her vertigo.  Pt was not recommended for radiosurgery given the size of the tumor.  Discussed that patient has likely had this since 2020 so this is not emergent.  Dr. Liu discussed risks and benefits in detail.  Risks include bleeding, infection,  pseudomeningocele, temporary or permanent damage to the facial nerve acoustic nerve, no improvement in symptoms.  This surgery would be done with neuro monitoring to avoid nerve damage   5. Follow up After audiogram or follow up sooner or go to the ED for any new, worsening or concerning signs or symptoms.  Alternatively pt can call if she would like to proceed with surgery for surgical case request order      Dr. Liu evaluated pt.  I, Ann Bragg, am acting as scribe      Total visit time:  30 minutes  More than 50% spent coordinating care, providing patient education, reviewing imaging and counseling.    Thank you very much for the kind referral.  Respectfully yours,    MATILDE Beltran, MATILDE  [address], [address] [address]  [phone]  3/19/2024 2:13 PM

## 2024-03-20 ENCOUNTER — TELEPHONE (OUTPATIENT)
Dept: SURGERY | Facility: CLINIC | Age: 59
End: 2024-03-20

## 2024-03-20 ENCOUNTER — APPOINTMENT (OUTPATIENT)
Dept: PHYSICAL THERAPY | Facility: HOSPITAL | Age: 59
End: 2024-03-20
Attending: INTERNAL MEDICINE
Payer: MEDICAID

## 2024-03-20 DIAGNOSIS — D33.3 VESTIBULAR SCHWANNOMA (HCC): Primary | ICD-10-CM

## 2024-03-20 NOTE — TELEPHONE ENCOUNTER
You are scheduled for Left image guided retrosigmoid craniectomy for tumor resection on 4.15.24 with  at [facility]    PCP clearance is needed.  We have faxed a letter requesting medical clearance to Dr. Tong.  Please contact their office for appointment.  Other specialist clearances may be requested by your PCP (cardiac, pulmonary, hematology etc.).     [facility] Aud[facility] exam is needed.  We have faxed a letter requesting an Audiology exam to Dr. Okeefe.  Please contact their office for appointment.  Other specialist clearances may be requested by your PCP (cardiac, pulmonary, hematology etc.).     You will need pre-op testing.  Please contact the Pre-admission department at [phone] to schedule your labs, COVID test, EKG and chest x-ray if ordered.  The COVID test needs to be done 48-72 hours before surgery regardless of vaccination status. If they do not answer when you call, please leave a message and they will call you back, they return calls in surgical order, it may be a few days before they return your call.    Do not take any blood thinning medications, over the counter non-steroidal antiinflammatories, (Advil, Aleve, Naproxen, Ibuprofen), herbal supplements (garlic,tumeric etc.), Vitamins, fish oil or krill oil for at least 7-14 days prior to surgery.     If you were on blood thinners (such as Coumadin, Pradaxa, Xarelto, etc) prior to surgery that we had you stop for surgery, please make sure you get instructions about when to resume the medication before you are discharged from the hospital    You may only take Tylenol, Extra Strength Tylenol, Arthritis Tylenol, or prescription Norco or Tramadol for pain if something is needed.    You should have nothing to eat or drink after 11:00pm  the night prior to surgery EXCEPT GATORADE.    Do drink 12 ounces of regular Gatorade (NOT RED) 12 hours and 4 hours prior to your scheduled surgery time. Do not drink any other liquids (including  water) before your surgery. Do not chew gum or eat candies before surgery.  Take 1000 mg of Tylenol (Acetaminophen) 4 hours before your scheduled surgery time, take this with your scheduled Gatorade.    The hospital will contact you 1-2 days before surgery with your arrival time.     If an MRI is needed for your surgery, it may be scheduled a day before or the morning of your surgery.                             Our office will get authorization for surgery. We will attempt to contact you 3 days before surgery if we run into any complications or have not received your surgery authorization. We will continue to attempt to get authorization until 2pm the day before surgery. If authorization is not received we will give you a call to discuss next steps. Our goal is to make sure you do not proceed with an un-authorized surgery so that you do not end up having to pay for this surgery out of pocket.  You will be in the Intensive Care Unit overnight, it is an average 3-5 days inpatient stay.  This is an estimate and varies from person to person.  Ultimately, the surgeon will determine when you are ready to be discharged.    You can expect to have some facial swelling on side of surgical site, which may migrate to opposite side. This is normal.     Post operative appointments to be made 2 and 6 weeks after surgery.      Your 2 week post-op appointment is on 4.30.24 at 2:00 pm with Dr Liu in the Neuro Oncology Clinic.  Your 6 week post-op appointment is on 5.28.24 at 2:00 pm with Dr Liu in the Neuro Oncology Clinic.    Please make sure to arrive at least 15 minutes prior to your scheduled appointment in order to get checked in and roomed in a timely manner.  Depending on provider availability, late patients may be required to reschedule.  REFILLS:  After surgery, please remember that we do have a 48 hour refill policy that does not include weekends, please make sure to request your medications in a timely manner so  that you do not go days without medication.  *Refills should be requested through your pharmacy or through the refill request in [employer] (log in, go to medications, then select refill request).    [employer] MESSAGING:  Please remember that our office is closed during the weekend and no one is available for [employer] messages. If you have an urgent or emergent matter please go to a walk-in center or the emergency room. Also please remember your [employer] messages are part of your legal medical record and should not be utilized as a personal email with our providers as it is visible to all [facility] employees. Also, Since [employer] messages are not for emergent matters it may be several days before there is a response to your message.    FMLA/PAPERWORK COMPLETED BY OUR MEDICAL FORMS DEPARTMENT:  If you require FMLA paperwork for your surgery, please make sure to either Drop it off or have it faxed to our office at [phone]. Make sure it has your NAME, , and has your signature. You will need to have a Release of Information on file. To facilitate this process we ask that you requested it at the  on your way out and sign it. Without a signed HANNA or signature on the form we will not be able to fax it and this will cause a delay with your forms. Fees charged for forms are $25 for initial submission and $15 for recertifications. If you have questions on the status of your forms please call the forms department at [phone].  **We do have a 3 week policy for all forms and paperwork, please make sure to allow plenty of time for completion. Same day paperwork will not be completed. **    Please visit the following website for the detailed and up-to-date visitor screening and restriction policy at [facility] [email or web address].    For office use only:    Medical Clearances requested: PCP, ENT for Audiology exam  PA needed: [facility]  CPT codes:

## 2024-03-20 NOTE — TELEPHONE ENCOUNTER
Surgical case request pended.  Pt will need baseline audiology evaluation completed pre op but does not need follow up appointment unless she has questions or concerns

## 2024-03-20 NOTE — TELEPHONE ENCOUNTER
I placed an order yesterday for [facility] as pt has an appointment with Dr. Okeefe.  It might show up under ENT

## 2024-03-26 ENCOUNTER — OFFICE VISIT (OUTPATIENT)
Dept: PHYSICAL THERAPY | Facility: HOSPITAL | Age: 59
End: 2024-03-26
Attending: INTERNAL MEDICINE
Payer: MEDICAID

## 2024-03-26 PROCEDURE — 97116 GAIT TRAINING THERAPY: CPT

## 2024-03-26 PROCEDURE — 97112 NEUROMUSCULAR REEDUCATION: CPT

## 2024-03-26 NOTE — TELEPHONE ENCOUNTER
Patient is scheduled for Left image guided retrosigmoid craniectomy for tumor resection on 4.15.24 with Dr Liu at [facility].    NA pre-op apt scheduled (if sx is more than 30 days from last apt)  NApre-op apt scheduled with RN spine navigator  Y Surgical instructions reviewed by nursing staff with patient  Y  form completed  Y Surgery order signed   Y Placed sx on surgery sheet  Y Placed on outlook calendar  Y [employer] message sent to patient with sx instructions  Y Faxed pre-op clearance request to PCP [facility]   NA Faxed letter to prescribing provider requesting anticoagulants be held for surgery  NA E-mail sent to [employer] [facility]  Y Post-op appointments made  Y PA NEEDED. Routed to PA team to initiate.  Y Post-Op outreach pt reminder placed.   Y Entire Neurosurgery Checklist Completed    Clearances: PCP, ENT  PA: DARR[facility]  CPT Codes: 03161, 33315, 72941

## 2024-03-26 NOTE — PROGRESS NOTES
Patient Name: Irma Garcia, : 1965, MRN: [id number]   Date:  3/26/2024  Referring Physician:  Waylon Tong    Diagnosis: left peripheral neuritis    Discharge Summary  Pt has attended 11 visits in physical therapy.    Progress Note Start Date: 24  End Date: 3/26/24    Subjective: Pt. Reports that her dizziness is about the same.  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  Pt. Reports that she has questions re: rehab following surgery.  Pt. Reports that she has not been able to exercise regularly due to arrangements for surgery, but has been trying to as she is able and reports that doing some exercise helps.    Assessment: Pt. Had been progressing in PT, but has had some new onset of changes in sensation around left side of lips/mouth and headache.  MRI on 3/18 revealed left vestibular schwannoma, and pt. Is scheduled for surgical resection.  Pt. Has had some improvement in gait stability and balance since beginning PT, as evidenced by functional testing.  She is a good candidate for rehab following her surgery.  Discussed post-surgical rehab process, and encouraged pt. To remain as active as possible until surgery.  No indication for further PT at this time, but pt. encouraged to discuss rehab for post-surgical recovery with her surgeon.        Objective:   Gait trainin min  Functional Gait Assessment   Item Description Score (0 worst, 3 best)    ___3____1. Gait Level Surface-  Time: ____5.2______seconds  ___3____2. Change in gait speed  ___2____3. Gait with horizontal head turns   ___2____4. Gait with vertical head turns  ___3____5. Gait and pivot turn  ___3____6. Step over obstacle  ___2____7. Gait with narrow base of support-  # of steps___8_____  ___2____8. Gait with eyes closed-  Time: ___7______seconds  ___3___9. Ambulating backward  ___3____10. Steps    TOTAL SCORE: ____26__/30  (was )    (less than 22/30 = fall risk)    Gait with horiz and vert head turns 40 ft x 2  F/B gait with turns 40 ft x 4    Neuromuscular re-education: 15 min  Romberg: EO 30 sec, EC 30 sec with increased sway  Romberg on Foam: EO 30 sec, EC unable  Tandem Stance: 30 sec (was 10 sec) ea foot forward- increased challenge  SLS: R EO 10 sec, EC 0 sec; L EO 10 sec, EC 0 sec        Goals    1.  Improved Functional Gait Assessment score to at least 27/30 to reflect an improvement in gait and functional balance.  (GOAL NOT MET)  2.  Pt. Able to  10 objects from the floor without loss of balance or dizziness in order to improve function for household chores and gardening. (GOAL MET)  3. Indep on stairs with reciprocal pattern, carrying 15 lbs, in order to improve functional mobility for household chores.  (GOAL MET)  4.  Indep climbing up/down 6 ft ladder without dizziness. (GOAL NOT MET)  5.  Indep gait on even and uneven surfaces including hills for up to 30 min in order to return to community gait and walking for exercise.  (GOAL MET)         Charges: NMR x 1, Gait x 2      Total Timed Treatment: 40 min  Total Treatment Time: 40 min    Plan: Discharge from PT with pt. To continue with home program as prescribed in order to maintain functional gains.    Thank you for your referral. If you have any questions, please contact me at [phone].    Sincerely,  Nely Joy PT, NCS    Electronically signed by therapist:  Nely Joy PT, JULIO

## 2024-03-27 ENCOUNTER — OFFICE VISIT (OUTPATIENT)
Facility: LOCATION | Age: 59
End: 2024-03-27
Payer: MEDICAID

## 2024-03-27 ENCOUNTER — PATIENT MESSAGE (OUTPATIENT)
Dept: NEUROLOGY | Facility: CLINIC | Age: 59
End: 2024-03-27

## 2024-03-27 ENCOUNTER — PATIENT MESSAGE (OUTPATIENT)
Dept: INTERNAL MEDICINE CLINIC | Facility: CLINIC | Age: 59
End: 2024-03-27

## 2024-03-27 DIAGNOSIS — R42 VERTIGO: Primary | ICD-10-CM

## 2024-03-27 DIAGNOSIS — R09.82 POST-NASAL DRAINAGE: ICD-10-CM

## 2024-03-27 DIAGNOSIS — H93.292 ABNORMAL AUDITORY PERCEPTION OF LEFT EAR: Primary | ICD-10-CM

## 2024-03-27 DIAGNOSIS — H90.3 ASYMMETRICAL SENSORINEURAL HEARING LOSS: ICD-10-CM

## 2024-03-27 PROCEDURE — 92567 TYMPANOMETRY: CPT | Performed by: AUDIOLOGIST

## 2024-03-27 PROCEDURE — 99204 OFFICE O/P NEW MOD 45 MIN: CPT | Performed by: OTOLARYNGOLOGY

## 2024-03-27 PROCEDURE — 92557 COMPREHENSIVE HEARING TEST: CPT | Performed by: AUDIOLOGIST

## 2024-03-27 RX ORDER — SODIUM CHLORIDE, SODIUM LACTATE, POTASSIUM CHLORIDE, CALCIUM CHLORIDE 600; 310; 30; 20 MG/100ML; MG/100ML; MG/100ML; MG/100ML
INJECTION, SOLUTION INTRAVENOUS CONTINUOUS
Status: CANCELLED | OUTPATIENT
Start: 2024-03-27

## 2024-03-27 RX ORDER — IPRATROPIUM BROMIDE 42 UG/1
1 SPRAY, METERED NASAL 2 TIMES DAILY
Qty: 1 EACH | Refills: 5 | Status: SHIPPED | OUTPATIENT
Start: 2024-03-27

## 2024-03-27 NOTE — PROGRESS NOTES
Irma Garcia was seen for an audiometric evaluation and tympanogram today. Referred back to physician.    Mandy New M.A. CORINAA

## 2024-03-27 NOTE — TELEPHONE ENCOUNTER
Received a request for CPT Code clarification regarding surgery from prior autherization.     Checked CPT Codes with  [facility]  - Quin states the correct coding for this procedure would be 82549, 27409, 07533    Case change request placed to correct coding.   Routed to the Prior auth team to be informed of change in codes.

## 2024-03-27 NOTE — PROGRESS NOTES
Irma Garcia is a 58 year old female.   Chief Complaint   Patient presents with    Ear Problem     HPI:   She has a history of hearing loss in the left ear.  She also has tinnitus.  She has had dizziness.  She was diagnosed with a brain tumor and she is to undergo surgery on April 15.  She also gets sinus issues including postnasal drip.  She tends to get halitosis associated.  Current Outpatient Medications   Medication Sig Dispense Refill    ipratropium 0.06 % Nasal Solution 1 spray by Nasal route 2 (two) times daily. 1 each 5    buPROPion  MG Oral Tablet 12 Hr Take 1 tablet (150 mg total) by mouth daily. 90 tablet 1    ALPRAZolam 0.25 MG Oral Tab Take 1-2 tablets up to twice daily for anxiety 30 tablet 1    SUMAtriptan (IMITREX) 25 MG Oral Tab Take 1 tablet (25 mg total) by mouth every 2 (two) hours as needed for Migraine. Use at onset; repeat once after 2 HRS-ONLY 2 IN 24 HR MAX 9 tablet 1    ALPRAZOLAM 0.25 MG Oral Tab TAKE 1 TABLET BY MOUTH DAILY AS NEEDED (Patient not taking: Reported on 2024) 14 tablet 0    VITAMIN B COMPLEX-C OR Take by mouth.      Cholecalciferol (VITAMIN D-3) 5000 UNITS Oral Tab Take 1 tablet (5,000 Units total) by mouth.        Past Medical History:   Diagnosis Date    Abnormal maternal glucose tolerance, complicating pregnancy, childbirth, or the puerperium, unspecified as to episode of care     Acute upper respiratory infections of unspecified site     Allergic rhinitis, cause unspecified     Anxiety     Carpal tunnel syndrome     HERNAN (obstructive sleep apnea) 3/15/22-[facility] sleep [facility]    AHI-5    Other B-complex deficiencies     Unspecified vitamin D deficiency       Social History:  Social History     Socioeconomic History    Marital status:    Tobacco Use    Smoking status: Former     Packs/day: 0.30     Years: 9.00     Additional pack years: 0.00     Total pack years: 2.70     Types: Cigarettes     Quit date:      Years since quittin.2    Smokeless  tobacco: Never   Vaping Use    Vaping Use: Never used   Substance and Sexual Activity    Alcohol use: Yes     Alcohol/week: 3.0 standard drinks of alcohol     Types: 3 Standard drinks or equivalent per week    Drug use: Yes     Types: Cannabis     Comment: 1-2 times monthly; edibles/smoking   Other Topics Concern    Caffeine Concern Yes    Exercise Yes      Past Surgical History:   Procedure Laterality Date    COLONOSCOPY  6/10/16    D & C  1/1/99    ENDOMETRIAL ABLATION      TONSILLECTOMY           REVIEW OF SYSTEMS:   GENERAL HEALTH: feels well otherwise  GENERAL : denies fever, chills, sweats, weight loss, weight gain  SKIN: denies any unusual skin lesions or rashes  RESPIRATORY: denies shortness of breath with exertion  NEURO: denies headaches    EXAM:   There were no vitals taken for this visit.    System Findings Details   Constitutional  Overall appearance - Normal.   Psychiatric  Orientation - Oriented to time, place, person & situation. Appropriate mood and affect.   Head/Face  Facial features -- Normal. Skull - Normal.   Eyes  Pupils equal ,round ,react to light and accomidate   Ears, Nose, Throat, Neck  Ears are clear no fluid or wax nose reveals some erythema congestion oropharynx clear neck no masses   Neurological  Memory - Normal. Cranial nerves - Cranial nerves II through XII grossly intact.   Lymph Detail  Submental. Submandibular. Anterior cervical. Posterior cervical. Supraclavicular.     Latest Audiogram Result (Hz) Exam performed: 3/27/2024 9:07 AM Last edited by Mandy New AUD on 3/27/2024 9:32 AM        125 250  1500 2000 3000 4000 6000 8000    Right air:  10 20  20  10  5 25 65    Left air:   35 30 20  15  15 40 40    Left air (masked):  45             Right mastoid bone:   15  20  0  5      Left mastoid bone:     20  15  10      Left mastoid bone (masked):  30 35               Reliability:  Good    Transducer:  Headphones    Technique:  Conventional Audiometry     Comments:            Latest Speech Audiometry  Last edited by Mandy New AUD on 3/27/2024 9:29 AM       Ear Method PTA SAT SRT [facility] Test/list Score (%) Intensity Mask/noise Notes    right    15    100 60      left    25    72 65 50      Comments: Patient could not tolerate intensity levels louder than 65dB in the left ear.                  Latest Tympanogram Result       Probe Tone (Hz): Unknown Exam performed: 3/27/2024 9:08 AM Last edited by Mandy New AUD on 3/27/2024 9:32 AM      Tympanograms  These were drawn by a user, not generated from device data      Right Ear Left Ear                     Right Ear Left Ear    Tympanogram type: Type A Type A    Canal volume (mL): 0.82 0.99    Peak pressure (daPa): -65 0    Peak amplitude (mL): 1.6 1.35    Tympanogram width (daPa):        Comments:                    Latest Audiogram and Tympanogram Result Text  Last edited by Mandy New AUD on 3/27/2024  9:32 AM      Study Result                 Narrative & Impression  Irma Garcia was seen today for an audiological evaluation.  Patient was diagnosed with a vestibular schwannoma 2 weeks ago and will have surgery in 2 weeks for removal.        Audiogram:    Right Ear= support essentially normal to borderline normal hearing 250-4000Hz sloping to a mild to moderately severe high frequency loss at 6-8kHz.    Left Ear= supports a moderate rising to mild sensorineural loss 250-750Hz, with normal hearing noted 1000-4000Hz, sloping to a mild loss at 6-8kHz.      Word recognition ability in quiet:   Right ear= excellent, 100%  Left ear= fair, 72%    Tympanograms: normal middle ear pressure and tympanic membrane mobility, bilaterally.      RECS: Follow-up with ENT.    Mandy New M.A. CCC-A  Audiologist [phone]                   ASSESSMENT AND PLAN:   1. Vertigo  Associated with tumor.  She is to undergo surgery on April 15.  Audiogram was reviewed with the patient which shows a moderate  sensorineural hearing loss in the left ear.  I am happy to see her back for repeat audiogram in the future.    2. Post-nasal drainage  Gets chronic postnasal drip and it may be associated with halitosis.  She will use nasal saline and Atrovent nasal spray regularly.  We will likely do this trial when she heals from her upcoming surgery.      The patient indicates understanding of these issues and agrees to the plan.    No follow-ups on file.    Andrea Okeefe MD  3/27/2024  2:12 PM

## 2024-03-27 NOTE — TELEPHONE ENCOUNTER
Prior Authorization for Inpatient surgery initiated with Alisha RAJPUT at [facility] [phone].  Requesting coverage for:Left image guided retrosigmoid craniectomy for tumor resection   Date of Service: 04/15/24   Inpatient days requested:  3 Days  CPT codes: 18843, 77591, 29248    Request for surgery pending review, pending reference #[id number]. Clinical notes faxed to [phone], confirmation received.

## 2024-03-28 ENCOUNTER — TELEPHONE (OUTPATIENT)
Dept: INTERNAL MEDICINE CLINIC | Facility: CLINIC | Age: 59
End: 2024-03-28

## 2024-03-28 ENCOUNTER — APPOINTMENT (OUTPATIENT)
Dept: CT IMAGING | Facility: HOSPITAL | Age: 59
End: 2024-03-28
Attending: EMERGENCY MEDICINE
Payer: MEDICAID

## 2024-03-28 ENCOUNTER — HOSPITAL ENCOUNTER (EMERGENCY)
Facility: HOSPITAL | Age: 59
Discharge: HOME | End: 2024-03-28
Attending: EMERGENCY MEDICINE
Payer: MEDICAID

## 2024-03-28 VITALS
TEMPERATURE: 99 F | HEART RATE: 76 BPM | OXYGEN SATURATION: 100 % | WEIGHT: 106.94 LBS | BODY MASS INDEX: 22 KG/M2 | RESPIRATION RATE: 17 BRPM | SYSTOLIC BLOOD PRESSURE: 124 MMHG | DIASTOLIC BLOOD PRESSURE: 85 MMHG

## 2024-03-28 DIAGNOSIS — D33.3 LEFT ACOUSTIC NEUROMA (HCC): ICD-10-CM

## 2024-03-28 DIAGNOSIS — G43.809 OTHER MIGRAINE WITHOUT STATUS MIGRAINOSUS, NOT INTRACTABLE: Primary | ICD-10-CM

## 2024-03-28 LAB
ALBUMIN SERPL-MCNC: 3.7 G/DL (ref 3.4–5)
ALBUMIN/GLOB SERPL: 1.2 {RATIO} (ref 1–2)
ALP LIVER SERPL-CCNC: 75 U/L
ALT SERPL-CCNC: 18 U/L
ANION GAP SERPL CALC-SCNC: 5 MMOL/L (ref 0–18)
AST SERPL-CCNC: 15 U/L (ref 15–37)
BASOPHILS # BLD AUTO: 0.03 X10(3) UL (ref 0–0.2)
BASOPHILS NFR BLD AUTO: 0.5 %
BILIRUB SERPL-MCNC: 0.4 MG/DL (ref 0.1–2)
BUN BLD-MCNC: 16 MG/DL (ref 9–23)
CALCIUM BLD-MCNC: 8.8 MG/DL (ref 8.5–10.1)
CHLORIDE SERPL-SCNC: 104 MMOL/L (ref 98–112)
CO2 SERPL-SCNC: 29 MMOL/L (ref 21–32)
CREAT BLD-MCNC: 1.01 MG/DL
EGFRCR SERPLBLD CKD-EPI 2021: 65 ML/MIN/1.73M2 (ref 60–?)
EOSINOPHIL # BLD AUTO: 0.08 X10(3) UL (ref 0–0.7)
EOSINOPHIL NFR BLD AUTO: 1.3 %
ERYTHROCYTE [DISTWIDTH] IN BLOOD BY AUTOMATED COUNT: 12.8 %
GLOBULIN PLAS-MCNC: 3.1 G/DL (ref 2.8–4.4)
GLUCOSE BLD-MCNC: 114 MG/DL (ref 70–99)
HCT VFR BLD AUTO: 39.8 %
HGB BLD-MCNC: 13.4 G/DL
IMM GRANULOCYTES # BLD AUTO: 0.02 X10(3) UL (ref 0–1)
IMM GRANULOCYTES NFR BLD: 0.3 %
LYMPHOCYTES # BLD AUTO: 1.61 X10(3) UL (ref 1–4)
LYMPHOCYTES NFR BLD AUTO: 25.6 %
MCH RBC QN AUTO: 30 PG (ref 26–34)
MCHC RBC AUTO-ENTMCNC: 33.7 G/DL (ref 31–37)
MCV RBC AUTO: 89.2 FL
MONOCYTES # BLD AUTO: 0.47 X10(3) UL (ref 0.1–1)
MONOCYTES NFR BLD AUTO: 7.5 %
NEUTROPHILS # BLD AUTO: 4.07 X10 (3) UL (ref 1.5–7.7)
NEUTROPHILS # BLD AUTO: 4.07 X10(3) UL (ref 1.5–7.7)
NEUTROPHILS NFR BLD AUTO: 64.8 %
OSMOLALITY SERPL CALC.SUM OF ELEC: 288 MOSM/KG (ref 275–295)
PLATELET # BLD AUTO: 259 10(3)UL (ref 150–450)
PLATELETS.RETICULATED NFR BLD AUTO: 2.9 % (ref 0–7)
POTASSIUM SERPL-SCNC: 3.4 MMOL/L (ref 3.5–5.1)
PROT SERPL-MCNC: 6.8 G/DL (ref 6.4–8.2)
RBC # BLD AUTO: 4.46 X10(6)UL
SODIUM SERPL-SCNC: 138 MMOL/L (ref 136–145)
WBC # BLD AUTO: 6.3 X10(3) UL (ref 4–11)

## 2024-03-28 PROCEDURE — 85025 COMPLETE CBC W/AUTO DIFF WBC: CPT | Performed by: EMERGENCY MEDICINE

## 2024-03-28 PROCEDURE — 80053 COMPREHEN METABOLIC PANEL: CPT | Performed by: EMERGENCY MEDICINE

## 2024-03-28 PROCEDURE — 70450 CT HEAD/BRAIN W/O DYE: CPT | Performed by: EMERGENCY MEDICINE

## 2024-03-28 PROCEDURE — 96374 THER/PROPH/DIAG INJ IV PUSH: CPT

## 2024-03-28 PROCEDURE — 99284 EMERGENCY DEPT VISIT MOD MDM: CPT

## 2024-03-28 RX ORDER — KETOROLAC TROMETHAMINE 15 MG/ML
15 INJECTION, SOLUTION INTRAMUSCULAR; INTRAVENOUS ONCE
Status: COMPLETED | OUTPATIENT
Start: 2024-03-28 | End: 2024-03-28

## 2024-03-28 RX ORDER — METOCLOPRAMIDE 10 MG/1
10 TABLET ORAL 3 TIMES DAILY PRN
Qty: 20 TABLET | Refills: 0 | Status: SHIPPED | OUTPATIENT
Start: 2024-03-28 | End: 2024-04-27

## 2024-03-28 NOTE — TELEPHONE ENCOUNTER
From: Irma Garcia  To: Randall Liu  Sent: 3/27/2024 6:16 PM CDT  Subject: Fainted and vomited     Hi Dr Liu, just wanted to let you know that last night I fainted while having a hot flash or something. Then I vomited a few times. Was Sweating profusely and no energy. Today I feel nauseous and headaches. Probably just a flu bug, right? Let me know if there should be any concern. Irma Garcia

## 2024-03-28 NOTE — TELEPHONE ENCOUNTER
From: Irma Garcia  To: Waylon Tong  Sent: 3/27/2024 6:18 PM CDT  Subject: Fainted and vomited    Hi Dr. Aric Liu, just wanted to let you know that last night I fainted while having a hot flash or something. Then I vomited a few times. Was Sweating profusely and no energy. Today I feel nauseous and headaches. Probably just a flu bug, right? Let me know if there should be any concern. I also sent this message to Dr. Aguilar. Irma Garcia

## 2024-03-28 NOTE — DISCHARGE INSTRUCTIONS
Take 1000 mg acetaminophen (2 Tylenol tablets) every 6 hours as needed for pain or fever.    I prescribed Reglan which helps with headache and nausea/vomiting.

## 2024-03-28 NOTE — TELEPHONE ENCOUNTER
Message below noted.    Response sent to patient informing her to go to ED or to immediate care for work up and to also contact her pcp.     Patient is scheduled for tumor resection on 4.15.24, however sudden syncope episode with onset of vomiting, sweating profusely, lack of energy, and headache are concerning and need proper work up.

## 2024-03-28 NOTE — TELEPHONE ENCOUNTER
Prior authorization request completed for: Left image guided retrosigmoid craniectomy for tumor resection    Authorization #[id number]  Authorization dates: 04/15/24-04/18/24  CPT codes approved: 88223, 32064, 83709  Number of visits/dates of service approved: Inpatient (3 Days)   Physician: Noe   Location: [facility]    Call Ref#: [id number]  Representative Name: Anna Marie   Insurance Carrier: [facility]

## 2024-03-28 NOTE — TELEPHONE ENCOUNTER
Patient has not yet reviewed her mychart. Called and informed pt of recommendation to go to ED or Immediate care.

## 2024-03-28 NOTE — TELEPHONE ENCOUNTER
Pt is calling to give AD an FYI she is heading to the ER per her Surgeon. No call back required. She will call back to schedule ER follow up if needed.

## 2024-03-28 NOTE — ED INITIAL ASSESSMENT (HPI)
Increased and different dizziness than her normal. Seen dentists, TMJ/ENT with no relief. Facial numbness, was diagnosed with a schwannoma. Removal in 2 weeks. Had two beers two days ago, headaches. Had syncopal episode two days ago. Told PCP who told patient to come to ED for eval for stroke/heart attack.

## 2024-03-28 NOTE — ED PROVIDER NOTES
Patient Seen in: [facility]      History     Chief Complaint   Patient presents with    Headache    Syncope     Stated Complaint: syncope episode & vomiting episode on 3/26th    Subjective:     HPI    58-year-old woman with HERNAN, anxiety, and migraine who was fairly recently diagnosed with a left-sided schwannoma at the CP angle.  2 days ago she had passed out.  Her boyfriend says she was snoring.  There was no seizure activity noticed.  The patient had vomiting afterward.  She did have a headache and dizziness since then that she attributes to a migraine.  She took some sumatriptan which did not help.  The headache persisted.  This morning she took ibuprofen which did help.  She was advised by primary care to come to the emergency department for evaluation.      Objective:   Past Medical History:   Diagnosis Date    Abnormal maternal glucose tolerance, complicating pregnancy, childbirth, or the puerperium, unspecified as to episode of care     Acute upper respiratory infections of unspecified site     Allergic rhinitis, cause unspecified     Anxiety     Carpal tunnel syndrome     HERNAN (obstructive sleep apnea) 3/15/22-[facility]    AHI-5    Other B-complex deficiencies     Sleep apnea     Mild sleep apnea - no treatment    Unspecified vitamin D deficiency     Visual impairment     glasses              Past Surgical History:   Procedure Laterality Date    COLONOSCOPY  6/10/16    D & C  99    ENDOMETRIAL ABLATION      TONSILLECTOMY                  Social History     Socioeconomic History    Marital status:    Tobacco Use    Smoking status: Former     Packs/day: 0.30     Years: 9.00     Additional pack years: 0.00     Total pack years: 2.70     Types: Cigarettes     Quit date:      Years since quittin.2    Smokeless tobacco: Never   Vaping Use    Vaping Use: Never used   Substance and Sexual Activity    Alcohol use: Yes     Alcohol/week: 3.0 standard drinks of alcohol      Types: 3 Standard drinks or equivalent per week    Drug use: Not Currently     Types: Cannabis     Comment: 1-2 times monthly; edibles/smoking   Other Topics Concern    Caffeine Concern Yes    Exercise Yes              Review of Systems    Positive for stated complaint: syncope episode & vomiting episode on 3/26th  Other systems are as noted in HPI.  Constitutional and vital signs reviewed.      All other systems reviewed and negative except as noted above.    Physical Exam     ED Triage Vitals [03/28/24 1028]   BP (!) 131/92   Pulse 90   Resp 16   Temp 99 °F (37.2 °C)   Temp src Temporal   SpO2 100 %   O2 Device None (Room air)       Current:/85   Pulse 76   Temp 99 °F (37.2 °C) (Temporal)   Resp 17   Wt 48.5 kg   LMP  (LMP Unknown)   SpO2 100%   BMI 22.35 kg/m²       General:  Vitals as listed.  No acute distress   HEENT: Sclerae anicteric.  Conjunctivae show no pallor.  Oropharynx clear, mucous membranes moist   Lungs: good air exchange   Extremities: no edema, normal peripheral pulses   Neuro: Alert oriented and nonfocal    ED Course     Labs Reviewed   COMP METABOLIC PANEL (14) - Abnormal; Notable for the following components:       Result Value    Glucose 114 (*)     Potassium 3.4 (*)     All other components within normal limits   CBC WITH DIFFERENTIAL WITH PLATELET    Narrative:     The following orders were created for panel order CBC With Differential With Platelet.  Procedure                               Abnormality         Status                     ---------                               -----------         ------                     CBC W/ DIFFERENTIAL[[id number]]                              Final result                 Please view results for these tests on the individual orders.   RAINBOW DRAW LAVENDER   RAINBOW DRAW LIGHT GREEN   RAINBOW DRAW BLUE   CBC W/ DIFFERENTIAL     CT BRAIN OR HEAD ([id number])    Result Date: 3/28/2024  CONCLUSION: 1. No acute intracranial findings.  2. Stable  hypodense lobulated mass centered in the left cerebellopontine angle which is better evaluated on prior MRI from 3/18/2024.     LOCATION:  [facility]   Dictated by (CST): Susana Dickey MD on 3/28/2024 at 12:08 PM     Finalized by (CST): Susana Dickey MD on 3/28/2024 at 12:11 PM        ED COURSE and MDM     Differential diagnosis before testing included headache due to migraine versus intracranial bleed, a medical condition that poses a threat to life.    I reviewed prior external notes including ENT note from yesterday by Dr. Okeefe when the patient presented with left hearing loss and tinnitus.  She was diagnosed with brain tumor and is scheduled for surgery on 4/15/2024.  MRI done 3/18/2024 showed a left CP angle mass likely a schwannoma/acoustic neuroma.    Given Toradol for pain control.  To use Tylenol/ibuprofen at home.  Also prescribe Reglan.    I have discussed with the patient the results of testing, differential diagnosis, and treatment plan. They expressed clear understanding of these instructions and agrees to the plan provided.    Disposition and Plan     Clinical Impression:  1. Other migraine without status migrainosus, not intractable    2. Left acoustic neuroma (HCC)         Disposition:  Discharge  3/28/2024 12:58 pm    Follow-up:  Waylon Tong MD  [address] [place] [id number]  [phone]    Schedule an appointment as soon as possible for a visit in 3 day(s)          Medications Prescribed:  Discharge Medication List as of 3/28/2024  1:01 PM        START taking these medications    Details   metoclopramide 10 MG Oral Tab Take 1 tablet (10 mg total) by mouth 3 (three) times daily as needed., Normal, Disp-20 tablet, R-0

## 2024-03-29 ENCOUNTER — HOSPITAL ENCOUNTER (OUTPATIENT)
Dept: GENERAL RADIOLOGY | Age: 59
Discharge: HOME | End: 2024-03-29
Attending: NEUROLOGICAL SURGERY
Payer: MEDICAID

## 2024-03-29 DIAGNOSIS — D33.3 VESTIBULAR SCHWANNOMA (HCC): ICD-10-CM

## 2024-03-29 PROCEDURE — 71046 X-RAY EXAM CHEST 2 VIEWS: CPT | Performed by: NEUROLOGICAL SURGERY

## 2024-04-01 ENCOUNTER — LAB ENCOUNTER (OUTPATIENT)
Dept: LAB | Age: 59
End: 2024-04-01
Attending: NEUROLOGICAL SURGERY
Payer: MEDICAID

## 2024-04-01 ENCOUNTER — TELEPHONE (OUTPATIENT)
Facility: LOCATION | Age: 59
End: 2024-04-01

## 2024-04-01 ENCOUNTER — TELEPHONE (OUTPATIENT)
Dept: INTERNAL MEDICINE CLINIC | Facility: CLINIC | Age: 59
End: 2024-04-01

## 2024-04-01 ENCOUNTER — OFFICE VISIT (OUTPATIENT)
Dept: INTERNAL MEDICINE CLINIC | Facility: CLINIC | Age: 59
End: 2024-04-01
Payer: MEDICAID

## 2024-04-01 VITALS
WEIGHT: 108.44 LBS | SYSTOLIC BLOOD PRESSURE: 120 MMHG | HEART RATE: 81 BPM | DIASTOLIC BLOOD PRESSURE: 84 MMHG | BODY MASS INDEX: 22.46 KG/M2 | HEIGHT: 58.25 IN | TEMPERATURE: 98 F

## 2024-04-01 DIAGNOSIS — D33.3 VESTIBULAR SCHWANNOMA (HCC): ICD-10-CM

## 2024-04-01 DIAGNOSIS — Z01.818 PREOP EXAMINATION: Primary | ICD-10-CM

## 2024-04-01 DIAGNOSIS — R42 VERTIGO: ICD-10-CM

## 2024-04-01 DIAGNOSIS — F06.30 MOOD DISORDER IN CONDITIONS CLASSIFIED ELSEWHERE: ICD-10-CM

## 2024-04-01 PROBLEM — N81.6 FEMALE PROCTOCELE WITHOUT UTERINE PROLAPSE: Status: ACTIVE | Noted: 2024-04-01

## 2024-04-01 PROBLEM — N81.4 UTERINE PROLAPSE: Status: ACTIVE | Noted: 2024-04-01

## 2024-04-01 LAB
ALBUMIN SERPL-MCNC: 4 G/DL (ref 3.4–5)
ALBUMIN/GLOB SERPL: 1.3 {RATIO} (ref 1–2)
ALP LIVER SERPL-CCNC: 67 U/L
ALT SERPL-CCNC: 18 U/L
ANION GAP SERPL CALC-SCNC: 5 MMOL/L (ref 0–18)
ANTIBODY SCREEN: NEGATIVE
APTT PPP: 29.8 SECONDS (ref 23.3–35.6)
AST SERPL-CCNC: 10 U/L (ref 15–37)
ATRIAL RATE: 68 BPM
BASOPHILS # BLD AUTO: 0.03 X10(3) UL (ref 0–0.2)
BASOPHILS NFR BLD AUTO: 0.5 %
BILIRUB SERPL-MCNC: 0.5 MG/DL (ref 0.1–2)
BUN BLD-MCNC: 13 MG/DL (ref 9–23)
CALCIUM BLD-MCNC: 9.5 MG/DL (ref 8.5–10.1)
CHLORIDE SERPL-SCNC: 105 MMOL/L (ref 98–112)
CO2 SERPL-SCNC: 30 MMOL/L (ref 21–32)
CREAT BLD-MCNC: 0.83 MG/DL
EGFRCR SERPLBLD CKD-EPI 2021: 82 ML/MIN/1.73M2 (ref 60–?)
EOSINOPHIL # BLD AUTO: 0.11 X10(3) UL (ref 0–0.7)
EOSINOPHIL NFR BLD AUTO: 1.8 %
ERYTHROCYTE [DISTWIDTH] IN BLOOD BY AUTOMATED COUNT: 13 %
GLOBULIN PLAS-MCNC: 3.1 G/DL (ref 2.8–4.4)
GLUCOSE BLD-MCNC: 96 MG/DL (ref 70–99)
HCT VFR BLD AUTO: 41.2 %
HGB BLD-MCNC: 13.4 G/DL
IMM GRANULOCYTES # BLD AUTO: 0.02 X10(3) UL (ref 0–1)
IMM GRANULOCYTES NFR BLD: 0.3 %
INR BLD: 1.07 (ref 0.8–1.2)
LYMPHOCYTES # BLD AUTO: 1.96 X10(3) UL (ref 1–4)
LYMPHOCYTES NFR BLD AUTO: 31.4 %
MCH RBC QN AUTO: 29.5 PG (ref 26–34)
MCHC RBC AUTO-ENTMCNC: 32.5 G/DL (ref 31–37)
MCV RBC AUTO: 90.5 FL
MONOCYTES # BLD AUTO: 0.44 X10(3) UL (ref 0.1–1)
MONOCYTES NFR BLD AUTO: 7.1 %
NEUTROPHILS # BLD AUTO: 3.68 X10 (3) UL (ref 1.5–7.7)
NEUTROPHILS # BLD AUTO: 3.68 X10(3) UL (ref 1.5–7.7)
NEUTROPHILS NFR BLD AUTO: 58.9 %
OSMOLALITY SERPL CALC.SUM OF ELEC: 290 MOSM/KG (ref 275–295)
P AXIS: 73 DEGREES
P-R INTERVAL: 116 MS
PLATELET # BLD AUTO: 250 10(3)UL (ref 150–450)
POTASSIUM SERPL-SCNC: 4.1 MMOL/L (ref 3.5–5.1)
PROT SERPL-MCNC: 7.1 G/DL (ref 6.4–8.2)
PROTHROMBIN TIME: 13.9 SECONDS (ref 11.6–14.8)
Q-T INTERVAL: 400 MS
QRS DURATION: 90 MS
QTC CALCULATION (BEZET): 425 MS
R AXIS: 73 DEGREES
RBC # BLD AUTO: 4.55 X10(6)UL
RH BLOOD TYPE: POSITIVE
SODIUM SERPL-SCNC: 140 MMOL/L (ref 136–145)
T AXIS: 59 DEGREES
VENTRICULAR RATE: 68 BPM
WBC # BLD AUTO: 6.2 X10(3) UL (ref 4–11)

## 2024-04-01 PROCEDURE — 86901 BLOOD TYPING SEROLOGIC RH(D): CPT

## 2024-04-01 PROCEDURE — 36415 COLL VENOUS BLD VENIPUNCTURE: CPT

## 2024-04-01 PROCEDURE — 80053 COMPREHEN METABOLIC PANEL: CPT

## 2024-04-01 PROCEDURE — 85025 COMPLETE CBC W/AUTO DIFF WBC: CPT

## 2024-04-01 PROCEDURE — 86850 RBC ANTIBODY SCREEN: CPT

## 2024-04-01 PROCEDURE — 85730 THROMBOPLASTIN TIME PARTIAL: CPT

## 2024-04-01 PROCEDURE — 85610 PROTHROMBIN TIME: CPT

## 2024-04-01 PROCEDURE — 86900 BLOOD TYPING SEROLOGIC ABO: CPT

## 2024-04-01 RX ORDER — ALPRAZOLAM 0.25 MG/1
0.25 TABLET ORAL DAILY PRN
Qty: 30 TABLET | Refills: 0 | Status: SHIPPED | OUTPATIENT
Start: 2024-04-01

## 2024-04-01 NOTE — TELEPHONE ENCOUNTER
Received HANNA from [facility] requesting progress/office notes, brain mri reports. Sending a copy to [employer] and scanning for further processing.

## 2024-04-01 NOTE — PROGRESS NOTES
[facility]  PRE OP RISK ASSESSMENT    REASON FOR CONSULT: Pre-op risk assessment for surgical procedure left image-guided retrosigmoid craniectomy for tumor resection planned for/15/2024 with general anesthesia.    REQUESTING PHYSICIAN: MD Noe    CHIEF COMPLAINT:   Chief Complaint   Patient presents with    Pre-Op Exam     Pt is having brain surgery on 4/15/24 with Dr. Liu. Paper work is in the red folder.Surgery was  to 4/15/24 per surgeon.      Dizziness     Pt c/o headaches, nausea, numbness/tingling lips, nose, left ear    Cold     Hands and feet       HISTORY OF PRESENT ILLNESS:   The patient is a 58 year old female who presents for preoperative evaluation.    Patient has experienced acute worsening of chronic vertigo, and perioral numbness and tingling prompting MRI evaluation revealing a vestibular schwannoma for which she is scheduled for a craniectomy and tumor resection.  No acute concerns.    Past Medical History:    Past Medical History:   Diagnosis Date    Abnormal maternal glucose tolerance, complicating pregnancy, childbirth, or the puerperium, unspecified as to episode of care     Acute upper respiratory infections of unspecified site     Allergic rhinitis, cause unspecified     Anxiety     Carpal tunnel syndrome     HERNAN (obstructive sleep apnea) 3/15/22-[facility] sleep st[facility]    AHI-5    Other B-complex deficiencies     Sleep apnea     Mild sleep apnea - no treatment    Unspecified vitamin D deficiency     Visual impairment     glasses        Past Surgical History:    Past Surgical History:   Procedure Laterality Date    COLONOSCOPY  6/10/16    D & C  1/1/99    ENDOMETRIAL ABLATION      TONSILLECTOMY         Current Medications:    Current Outpatient Medications   Medication Sig Dispense Refill    ALPRAZolam 0.25 MG Oral Tab Take 1 tablet (0.25 mg total) by mouth daily as needed. 30 tablet 0    metoclopramide 10 MG Oral Tab Take 1 tablet (10 mg total) by mouth 3 (three) times daily  as needed. 20 tablet 0    buPROPion  MG Oral Tablet 12 Hr Take 1 tablet (150 mg total) by mouth daily. 90 tablet 1    VITAMIN B COMPLEX-C OR Take by mouth.      Cholecalciferol (VITAMIN D-3) 5000 UNITS Oral Tab Take 1 tablet (5,000 Units total) by mouth.      ipratropium 0.06 % Nasal Solution 1 spray by Nasal route 2 (two) times daily. (Patient not taking: Reported on 2024) 1 each 5    SUMAtriptan (IMITREX) 25 MG Oral Tab Take 1 tablet (25 mg total) by mouth every 2 (two) hours as needed for Migraine. Use at onset; repeat once after 2 HRS-ONLY 2 IN 24 HR MAX (Patient not taking: Reported on 2024) 9 tablet 1        Allergies:    Allergies as of 2024 - Review Complete 2024   Allergen Reaction Noted    Flagyl [metronidazole hcl] HIVES and SWELLING 2012    Metronidazole HIVES 2020       SOCIAL HISTORY:   Social History     Socioeconomic History    Marital status:      Spouse name: Not on file    Number of children: Not on file    Years of education: Not on file    Highest education level: Not on file   Occupational History    Not on file   Tobacco Use    Smoking status: Former     Packs/day: 0.30     Years: 9.00     Additional pack years: 0.00     Total pack years: 2.70     Types: Cigarettes     Quit date:      Years since quittin.2    Smokeless tobacco: Never   Vaping Use    Vaping Use: Never used   Substance and Sexual Activity    Alcohol use: Yes     Alcohol/week: 3.0 standard drinks of alcohol     Types: 3 Standard drinks or equivalent per week    Drug use: Not Currently     Types: Cannabis     Comment: 1-2 times monthly; edibles/smoking    Sexual activity: Not on file   Other Topics Concern     Service Not Asked    Blood Transfusions Not Asked    Caffeine Concern Yes    Occupational Exposure Not Asked    Hobby Hazards Not Asked    Sleep Concern Not Asked    Stress Concern Not Asked    Weight Concern Not Asked    Special Diet Not Asked    Back Care Not  Asked    Exercise Yes    Bike Helmet Not Asked    Seat Belt Not Asked    Self-Exams Not Asked   Social History Narrative    Not on file     Social Determinants of Health     Financial Resource Strain: Not on file   Food Insecurity: Not on file   Transportation Needs: Not on file   Physical Activity: Not on file   Stress: Not on file   Social Connections: Not on file   Housing Stability: Not on file        FAMILY HISTORY:   Family History   Problem Relation Age of Onset    Other (type 2 diabetes) Father         REVIEW OF SYSTEMS:    GENERAL: feels well otherwise  SKIN: denies any unusual skin lesions  HEENT: denies blurred vision or double vision denies nasal congestion  LUNGS: denies shortness of breath with exertion  CARDIOVASCULAR: denies chest pain on exertion  GI: denies abdominal pain, denies heartburn  : denies dysuria, urgency, frequency, hematuria  MUSCULOSKELETAL: denies back pain  NEURO: denies headaches    PRE-OP ROS  NSAIDS/PLATELET INH: Holding NSAID therapy  DIABETIC MEDICATIONS: No  HERNAN: Mild; no current treatment  Hx of VTE: No  BLEEDING DISORDER: No  LOOSE DENTITION OR DENTAL APPLIANCES: No  URI, COUGH, CP, FEVER: No  CERVICAL SPINE RESTRICTION: No known.  No history of rheumatoid arthritis.    PHYSICAL EXAM:  /84   Pulse 81   Temp 97.9 °F (36.6 °C)   Ht 4' 10.25\" (1.48 m)   Wt 108 lb 7 oz (49.2 kg)   LMP  (LMP Unknown)   BMI 22.47 kg/m²   GENERAL: Well developed, well nourished,in no apparent distress  SKIN: No rashes,no suspicious lesions  EYES: Bilateral conjunctiva are clear  HEENT: atraumatic, normocephalic  NECK: supple,no adenopathy,no bruits  LUNGS: clear to auscultation  CARDIO: RRR without murmur  GI: good BS's,no masses, HSM or tenderness    LABS:  Pending collection    ASSESSMENT AND PLAN:    1. Preop examination  The patient is at acceptable risk of complications for the planned procedure  Able to achieve at least 4 METS  - EKG with interpretation and Report -IN OFFICE  [99536]: Normal sinus rhythm with ventricular rate of 68 bpm, QRS duration of 90 ms, QTc of 425 ms, NC interval of 116 with no acute ST or T wave changes    2. Vestibular schwannoma (HCC)  Manifesting with worsening chronic vertigo and perioral numbness    3. Vertigo    4. Mood disorder in conditions classified elsewhere  - ALPRAZolam 0.25 MG Oral Tab; Take 1 tablet (0.25 mg total) by mouth daily as needed.  Dispense: 30 tablet; Refill: 0      Encounter Diagnoses   Name Primary?    Preop examination Yes    Vestibular schwannoma (HCC)     Vertigo     Mood disorder in conditions classified elsewhere        No orders of the defined types were placed in this encounter.      Imaging & Consults:  ELECTROCARDIOGRAM, COMPLETE    Waylon Tong MD

## 2024-04-01 NOTE — TELEPHONE ENCOUNTER
CRISTINA DENG (Key: [id number]) ipratropium    form thumbnail  Your information has been submitted to [employer]. [employer] is reviewing the PA request and you will receive an electronic response. You may check for the updated outcome later by reopening this request. The standard fax determination will also be sent to you directly.    If you have any questions about your PA submission, contact [employer] at [phone].

## 2024-04-02 ENCOUNTER — APPOINTMENT (OUTPATIENT)
Dept: PHYSICAL THERAPY | Facility: HOSPITAL | Age: 59
End: 2024-04-02
Attending: INTERNAL MEDICINE
Payer: MEDICAID

## 2024-04-02 RX ORDER — AZELASTINE 1 MG/ML
2 SPRAY, METERED NASAL 2 TIMES DAILY
Qty: 30 ML | Refills: 5 | Status: SHIPPED | OUTPATIENT
Start: 2024-04-02

## 2024-04-02 NOTE — TELEPHONE ENCOUNTER
[facility] denied pt's prescription of Ipratropium Bromide Nasal Solution 0.06%. Preferred drugs are: Flunisolide (0.025%), Fluticasone (50mcg nasal spray), azelastine (0.1%, 0.15% spray), or olopatadine (0.6% nasal spray). Thank you.

## 2024-04-09 NOTE — TELEPHONE ENCOUNTER
PCP presurgical clearance received per OV note in chart dated 4.1.24, \"The patient is at acceptable risk of complications for the planned procedure\"

## 2024-04-12 ENCOUNTER — LAB ENCOUNTER (OUTPATIENT)
Dept: LAB | Age: 59
End: 2024-04-12
Attending: NEUROLOGICAL SURGERY
Payer: MEDICAID

## 2024-04-12 DIAGNOSIS — Z01.812 ENCOUNTER FOR PREPROCEDURE SCREENING LABORATORY TESTING FOR COVID-19: ICD-10-CM

## 2024-04-12 DIAGNOSIS — Z11.52 ENCOUNTER FOR PREPROCEDURE SCREENING LABORATORY TESTING FOR COVID-19: ICD-10-CM

## 2024-04-12 PROCEDURE — 87635 SARS-COV-2 COVID-19 AMP PRB: CPT

## 2024-04-13 LAB — SARS-COV-2 RNA RESP QL NAA+PROBE: NOT DETECTED

## 2024-04-15 ENCOUNTER — ANESTHESIA (OUTPATIENT)
Dept: SURGERY | Facility: HOSPITAL | Age: 59
End: 2024-04-15
Payer: MEDICAID

## 2024-04-15 ENCOUNTER — ANESTHESIA EVENT (OUTPATIENT)
Dept: SURGERY | Facility: HOSPITAL | Age: 59
End: 2024-04-15
Payer: MEDICAID

## 2024-04-15 ENCOUNTER — HOSPITAL ENCOUNTER (INPATIENT)
Facility: HOSPITAL | Age: 59
LOS: 3 days | Discharge: HOME HEALTH | End: 2024-04-18
Attending: NEUROLOGICAL SURGERY | Admitting: NEUROLOGICAL SURGERY
Payer: MEDICAID

## 2024-04-15 DIAGNOSIS — G89.18 POST-OP PAIN: ICD-10-CM

## 2024-04-15 DIAGNOSIS — D33.3 VESTIBULAR SCHWANNOMA (HCC): ICD-10-CM

## 2024-04-15 DIAGNOSIS — Z11.52 ENCOUNTER FOR PREPROCEDURE SCREENING LABORATORY TESTING FOR COVID-19: Primary | ICD-10-CM

## 2024-04-15 DIAGNOSIS — Z01.812 ENCOUNTER FOR PREPROCEDURE SCREENING LABORATORY TESTING FOR COVID-19: Primary | ICD-10-CM

## 2024-04-15 PROBLEM — F41.9 ANXIETY: Status: ACTIVE | Noted: 2024-04-15

## 2024-04-15 PROBLEM — R29.810 FACIAL DROOP: Status: ACTIVE | Noted: 2024-04-15

## 2024-04-15 PROBLEM — Z72.0 TOBACCO USE: Status: ACTIVE | Noted: 2024-04-15

## 2024-04-15 LAB
B-HCG UR QL: NEGATIVE
GLUCOSE BLD-MCNC: 155 MG/DL (ref 70–99)
MRSA DNA SPEC QL NAA+PROBE: NEGATIVE

## 2024-04-15 PROCEDURE — 00BC0ZZ EXCISION OF CEREBELLUM, OPEN APPROACH: ICD-10-PCS | Performed by: NEUROLOGICAL SURGERY

## 2024-04-15 PROCEDURE — 99254 IP/OBS CNSLTJ NEW/EST MOD 60: CPT | Performed by: STUDENT IN AN ORGANIZED HEALTH CARE EDUCATION/TRAINING PROGRAM

## 2024-04-15 PROCEDURE — 00U20KZ SUPPLEMENT DURA MATER WITH NONAUTOLOGOUS TISSUE SUBSTITUTE, OPEN APPROACH: ICD-10-PCS | Performed by: NEUROLOGICAL SURGERY

## 2024-04-15 PROCEDURE — 4A10X4G MONITORING OF CENTRAL NERVOUS ELECTRICAL ACTIVITY, INTRAOPERATIVE, EXTERNAL APPROACH: ICD-10-PCS | Performed by: NEUROLOGICAL SURGERY

## 2024-04-15 PROCEDURE — 8E09XBZ COMPUTER ASSISTED PROCEDURE OF HEAD AND NECK REGION: ICD-10-PCS | Performed by: NEUROLOGICAL SURGERY

## 2024-04-15 PROCEDURE — 99291 CRITICAL CARE FIRST HOUR: CPT | Performed by: INTERNAL MEDICINE

## 2024-04-15 DEVICE — SCREW, AXS, SELF-DRILLING
Type: IMPLANTABLE DEVICE | Site: HEAD | Status: FUNCTIONAL
Brand: UNIVERSAL NEURO 3

## 2024-04-15 DEVICE — VISTASEAL FIBRIN SEAL 4 ML: Type: IMPLANTABLE DEVICE | Site: HEAD | Status: FUNCTIONAL

## 2024-04-15 DEVICE — 1,5/1,7MM DYNAMIC MESH-MALL-MEDIUM: Type: IMPLANTABLE DEVICE | Site: HEAD | Status: FUNCTIONAL

## 2024-04-15 DEVICE — COLLAGEN DURAL REGENERATION MEMBRANE 1IN X 1IN (2.5CM X 2.5CM)
Type: IMPLANTABLE DEVICE | Site: HEAD | Status: FUNCTIONAL
Brand: DURAMATRIX-ONLAY PLUS

## 2024-04-15 RX ORDER — MANNITOL 20 G/100ML
INJECTION, SOLUTION INTRAVENOUS AS NEEDED
Status: DISCONTINUED | OUTPATIENT
Start: 2024-04-15 | End: 2024-04-15 | Stop reason: SURG

## 2024-04-15 RX ORDER — LIDOCAINE HYDROCHLORIDE 40 MG/ML
SOLUTION TOPICAL AS NEEDED
Status: DISCONTINUED | OUTPATIENT
Start: 2024-04-15 | End: 2024-04-15 | Stop reason: SURG

## 2024-04-15 RX ORDER — BUPROPION HYDROCHLORIDE 150 MG/1
150 TABLET, EXTENDED RELEASE ORAL DAILY
Status: DISCONTINUED | OUTPATIENT
Start: 2024-04-16 | End: 2024-04-18

## 2024-04-15 RX ORDER — ENEMA 19; 7 G/133ML; G/133ML
1 ENEMA RECTAL ONCE AS NEEDED
Status: DISCONTINUED | OUTPATIENT
Start: 2024-04-15 | End: 2024-04-18

## 2024-04-15 RX ORDER — HYDROMORPHONE HYDROCHLORIDE 1 MG/ML
0.8 INJECTION, SOLUTION INTRAMUSCULAR; INTRAVENOUS; SUBCUTANEOUS EVERY 2 HOUR PRN
Status: DISCONTINUED | OUTPATIENT
Start: 2024-04-15 | End: 2024-04-18

## 2024-04-15 RX ORDER — HYDROMORPHONE HYDROCHLORIDE 1 MG/ML
0.2 INJECTION, SOLUTION INTRAMUSCULAR; INTRAVENOUS; SUBCUTANEOUS EVERY 5 MIN PRN
Status: DISCONTINUED | OUTPATIENT
Start: 2024-04-15 | End: 2024-04-15 | Stop reason: HOSPADM

## 2024-04-15 RX ORDER — LABETALOL HYDROCHLORIDE 5 MG/ML
10 INJECTION, SOLUTION INTRAVENOUS EVERY 10 MIN PRN
Status: DISCONTINUED | OUTPATIENT
Start: 2024-04-15 | End: 2024-04-18

## 2024-04-15 RX ORDER — MEPERIDINE HYDROCHLORIDE 25 MG/ML
12.5 INJECTION INTRAMUSCULAR; INTRAVENOUS; SUBCUTANEOUS AS NEEDED
Status: DISCONTINUED | OUTPATIENT
Start: 2024-04-15 | End: 2024-04-15 | Stop reason: HOSPADM

## 2024-04-15 RX ORDER — ACETAMINOPHEN 10 MG/ML
1000 INJECTION, SOLUTION INTRAVENOUS EVERY 6 HOURS
Status: DISCONTINUED | OUTPATIENT
Start: 2024-04-15 | End: 2024-04-17

## 2024-04-15 RX ORDER — ONDANSETRON 2 MG/ML
4 INJECTION INTRAMUSCULAR; INTRAVENOUS EVERY 6 HOURS PRN
Status: DISCONTINUED | OUTPATIENT
Start: 2024-04-15 | End: 2024-04-15 | Stop reason: HOSPADM

## 2024-04-15 RX ORDER — PROCHLORPERAZINE EDISYLATE 5 MG/ML
5 INJECTION INTRAMUSCULAR; INTRAVENOUS EVERY 8 HOURS PRN
Status: DISCONTINUED | OUTPATIENT
Start: 2024-04-15 | End: 2024-04-15 | Stop reason: HOSPADM

## 2024-04-15 RX ORDER — ONDANSETRON 2 MG/ML
4 INJECTION INTRAMUSCULAR; INTRAVENOUS EVERY 6 HOURS PRN
Status: DISCONTINUED | OUTPATIENT
Start: 2024-04-15 | End: 2024-04-18

## 2024-04-15 RX ORDER — ACETAMINOPHEN 500 MG
1000 TABLET ORAL ONCE
Status: DISCONTINUED | OUTPATIENT
Start: 2024-04-15 | End: 2024-04-15 | Stop reason: HOSPADM

## 2024-04-15 RX ORDER — CEFAZOLIN SODIUM/WATER 2 G/20 ML
SYRINGE (ML) INTRAVENOUS
Status: DISPENSED
Start: 2024-04-15 | End: 2024-04-15

## 2024-04-15 RX ORDER — PHENYLEPHRINE HCL 10 MG/ML
VIAL (ML) INJECTION AS NEEDED
Status: DISCONTINUED | OUTPATIENT
Start: 2024-04-15 | End: 2024-04-15 | Stop reason: SURG

## 2024-04-15 RX ORDER — OXYCODONE HYDROCHLORIDE 5 MG/1
5 TABLET ORAL EVERY 4 HOURS PRN
Status: DISCONTINUED | OUTPATIENT
Start: 2024-04-15 | End: 2024-04-18

## 2024-04-15 RX ORDER — NICOTINE 21 MG/24HR
1 PATCH, TRANSDERMAL 24 HOURS TRANSDERMAL DAILY
Status: DISCONTINUED | OUTPATIENT
Start: 2024-04-15 | End: 2024-04-18

## 2024-04-15 RX ORDER — DIPHENHYDRAMINE HYDROCHLORIDE 50 MG/ML
12.5 INJECTION INTRAMUSCULAR; INTRAVENOUS AS NEEDED
Status: DISCONTINUED | OUTPATIENT
Start: 2024-04-15 | End: 2024-04-15 | Stop reason: HOSPADM

## 2024-04-15 RX ORDER — ACETAMINOPHEN 10 MG/ML
INJECTION, SOLUTION INTRAVENOUS
Status: COMPLETED
Start: 2024-04-15 | End: 2024-04-15

## 2024-04-15 RX ORDER — HYDRALAZINE HYDROCHLORIDE 20 MG/ML
10 INJECTION INTRAMUSCULAR; INTRAVENOUS
Status: DISCONTINUED | OUTPATIENT
Start: 2024-04-15 | End: 2024-04-18

## 2024-04-15 RX ORDER — ALPRAZOLAM 0.25 MG/1
0.25 TABLET ORAL EVERY 8 HOURS PRN
Status: DISCONTINUED | OUTPATIENT
Start: 2024-04-15 | End: 2024-04-15

## 2024-04-15 RX ORDER — FAMOTIDINE 10 MG/ML
20 INJECTION, SOLUTION INTRAVENOUS 2 TIMES DAILY
Status: DISCONTINUED | OUTPATIENT
Start: 2024-04-15 | End: 2024-04-18

## 2024-04-15 RX ORDER — LORAZEPAM 2 MG/ML
1 INJECTION INTRAMUSCULAR EVERY 6 HOURS PRN
Status: DISCONTINUED | OUTPATIENT
Start: 2024-04-15 | End: 2024-04-18

## 2024-04-15 RX ORDER — CEFAZOLIN SODIUM/WATER 2 G/20 ML
2 SYRINGE (ML) INTRAVENOUS EVERY 8 HOURS
Qty: 40 ML | Refills: 0 | Status: COMPLETED | OUTPATIENT
Start: 2024-04-15 | End: 2024-04-16

## 2024-04-15 RX ORDER — OXYCODONE HYDROCHLORIDE 10 MG/1
10 TABLET ORAL EVERY 4 HOURS PRN
Status: DISCONTINUED | OUTPATIENT
Start: 2024-04-15 | End: 2024-04-18

## 2024-04-15 RX ORDER — CEFAZOLIN SODIUM/WATER 2 G/20 ML
2 SYRINGE (ML) INTRAVENOUS ONCE
Status: COMPLETED | OUTPATIENT
Start: 2024-04-15 | End: 2024-04-15

## 2024-04-15 RX ORDER — LABETALOL HYDROCHLORIDE 5 MG/ML
INJECTION, SOLUTION INTRAVENOUS
Status: DISCONTINUED
Start: 2024-04-15 | End: 2024-04-15 | Stop reason: WASHOUT

## 2024-04-15 RX ORDER — DEXAMETHASONE SODIUM PHOSPHATE 4 MG/ML
4 VIAL (ML) INJECTION EVERY 6 HOURS
Status: DISCONTINUED | OUTPATIENT
Start: 2024-04-15 | End: 2024-04-17

## 2024-04-15 RX ORDER — BUPIVACAINE HYDROCHLORIDE AND EPINEPHRINE 2.5; 5 MG/ML; UG/ML
INJECTION, SOLUTION EPIDURAL; INFILTRATION; INTRACAUDAL; PERINEURAL AS NEEDED
Status: DISCONTINUED | OUTPATIENT
Start: 2024-04-15 | End: 2024-04-15 | Stop reason: HOSPADM

## 2024-04-15 RX ORDER — DEXAMETHASONE SODIUM PHOSPHATE 4 MG/ML
VIAL (ML) INJECTION AS NEEDED
Status: DISCONTINUED | OUTPATIENT
Start: 2024-04-15 | End: 2024-04-15 | Stop reason: SURG

## 2024-04-15 RX ORDER — BISACODYL 10 MG
10 SUPPOSITORY, RECTAL RECTAL
Status: DISCONTINUED | OUTPATIENT
Start: 2024-04-15 | End: 2024-04-18

## 2024-04-15 RX ORDER — HYDROMORPHONE HYDROCHLORIDE 1 MG/ML
0.4 INJECTION, SOLUTION INTRAMUSCULAR; INTRAVENOUS; SUBCUTANEOUS EVERY 5 MIN PRN
Status: DISCONTINUED | OUTPATIENT
Start: 2024-04-15 | End: 2024-04-15 | Stop reason: HOSPADM

## 2024-04-15 RX ORDER — MIDAZOLAM HYDROCHLORIDE 1 MG/ML
INJECTION INTRAMUSCULAR; INTRAVENOUS AS NEEDED
Status: DISCONTINUED | OUTPATIENT
Start: 2024-04-15 | End: 2024-04-15 | Stop reason: SURG

## 2024-04-15 RX ORDER — HYDROMORPHONE HYDROCHLORIDE 1 MG/ML
0.6 INJECTION, SOLUTION INTRAMUSCULAR; INTRAVENOUS; SUBCUTANEOUS EVERY 5 MIN PRN
Status: DISCONTINUED | OUTPATIENT
Start: 2024-04-15 | End: 2024-04-15 | Stop reason: HOSPADM

## 2024-04-15 RX ORDER — HYDROMORPHONE HYDROCHLORIDE 1 MG/ML
0.4 INJECTION, SOLUTION INTRAMUSCULAR; INTRAVENOUS; SUBCUTANEOUS EVERY 2 HOUR PRN
Status: DISCONTINUED | OUTPATIENT
Start: 2024-04-15 | End: 2024-04-18

## 2024-04-15 RX ORDER — ROCURONIUM BROMIDE 10 MG/ML
INJECTION, SOLUTION INTRAVENOUS AS NEEDED
Status: DISCONTINUED | OUTPATIENT
Start: 2024-04-15 | End: 2024-04-15 | Stop reason: SURG

## 2024-04-15 RX ORDER — LORAZEPAM 2 MG/ML
0.5 INJECTION INTRAMUSCULAR EVERY 6 HOURS PRN
Status: DISCONTINUED | OUTPATIENT
Start: 2024-04-15 | End: 2024-04-18

## 2024-04-15 RX ORDER — ONDANSETRON 2 MG/ML
INJECTION INTRAMUSCULAR; INTRAVENOUS AS NEEDED
Status: DISCONTINUED | OUTPATIENT
Start: 2024-04-15 | End: 2024-04-15 | Stop reason: SURG

## 2024-04-15 RX ORDER — SODIUM CHLORIDE 9 MG/ML
INJECTION, SOLUTION INTRAVENOUS CONTINUOUS
Status: DISCONTINUED | OUTPATIENT
Start: 2024-04-15 | End: 2024-04-15

## 2024-04-15 RX ORDER — SODIUM CHLORIDE, SODIUM LACTATE, POTASSIUM CHLORIDE, CALCIUM CHLORIDE 600; 310; 30; 20 MG/100ML; MG/100ML; MG/100ML; MG/100ML
INJECTION, SOLUTION INTRAVENOUS CONTINUOUS
Status: DISCONTINUED | OUTPATIENT
Start: 2024-04-15 | End: 2024-04-15 | Stop reason: HOSPADM

## 2024-04-15 RX ORDER — POLYETHYLENE GLYCOL 3350 17 G/17G
17 POWDER, FOR SOLUTION ORAL DAILY PRN
Status: DISCONTINUED | OUTPATIENT
Start: 2024-04-15 | End: 2024-04-18

## 2024-04-15 RX ORDER — SODIUM CHLORIDE 9 MG/ML
INJECTION, SOLUTION INTRAVENOUS CONTINUOUS PRN
Status: DISCONTINUED | OUTPATIENT
Start: 2024-04-15 | End: 2024-04-15 | Stop reason: SURG

## 2024-04-15 RX ORDER — MIDAZOLAM HYDROCHLORIDE 1 MG/ML
1 INJECTION INTRAMUSCULAR; INTRAVENOUS EVERY 5 MIN PRN
Status: DISCONTINUED | OUTPATIENT
Start: 2024-04-15 | End: 2024-04-15 | Stop reason: HOSPADM

## 2024-04-15 RX ORDER — ALPRAZOLAM 0.25 MG/1
0.25 TABLET ORAL 3 TIMES DAILY PRN
Status: DISCONTINUED | OUTPATIENT
Start: 2024-04-15 | End: 2024-04-18

## 2024-04-15 RX ORDER — SODIUM CHLORIDE 9 MG/ML
INJECTION, SOLUTION INTRAVENOUS CONTINUOUS
Status: DISCONTINUED | OUTPATIENT
Start: 2024-04-15 | End: 2024-04-17

## 2024-04-15 RX ORDER — NALOXONE HYDROCHLORIDE 0.4 MG/ML
0.08 INJECTION, SOLUTION INTRAMUSCULAR; INTRAVENOUS; SUBCUTANEOUS AS NEEDED
Status: DISCONTINUED | OUTPATIENT
Start: 2024-04-15 | End: 2024-04-15 | Stop reason: HOSPADM

## 2024-04-15 RX ORDER — SENNOSIDES 8.6 MG
17.2 TABLET ORAL NIGHTLY PRN
Status: DISCONTINUED | OUTPATIENT
Start: 2024-04-15 | End: 2024-04-18

## 2024-04-15 RX ORDER — FAMOTIDINE 20 MG/1
20 TABLET, FILM COATED ORAL 2 TIMES DAILY
Status: DISCONTINUED | OUTPATIENT
Start: 2024-04-15 | End: 2024-04-18

## 2024-04-15 RX ADMIN — ONDANSETRON 4 MG: 2 INJECTION INTRAMUSCULAR; INTRAVENOUS at 11:45:00

## 2024-04-15 RX ADMIN — PHENYLEPHRINE HCL 200 MCG: 10 MG/ML VIAL (ML) INJECTION at 09:12:00

## 2024-04-15 RX ADMIN — MIDAZOLAM HYDROCHLORIDE 2 MG: 1 INJECTION INTRAMUSCULAR; INTRAVENOUS at 07:37:00

## 2024-04-15 RX ADMIN — ROCURONIUM BROMIDE 50 MG: 10 INJECTION, SOLUTION INTRAVENOUS at 07:40:00

## 2024-04-15 RX ADMIN — CEFAZOLIN SODIUM/WATER 2 G: 2 G/20 ML SYRINGE (ML) INTRAVENOUS at 08:45:00

## 2024-04-15 RX ADMIN — LIDOCAINE HYDROCHLORIDE 4 ML: 40 SOLUTION TOPICAL at 07:44:00

## 2024-04-15 RX ADMIN — SODIUM CHLORIDE: 9 INJECTION, SOLUTION INTRAVENOUS at 10:28:00

## 2024-04-15 RX ADMIN — SODIUM CHLORIDE: 9 INJECTION, SOLUTION INTRAVENOUS at 07:55:00

## 2024-04-15 RX ADMIN — DEXAMETHASONE SODIUM PHOSPHATE 10 MG: 4 MG/ML VIAL (ML) INJECTION at 07:40:00

## 2024-04-15 RX ADMIN — PHENYLEPHRINE HCL 200 MCG: 10 MG/ML VIAL (ML) INJECTION at 07:52:00

## 2024-04-15 RX ADMIN — MANNITOL 250 ML: 20 INJECTION, SOLUTION INTRAVENOUS at 09:00:00

## 2024-04-15 NOTE — INTERVAL H&P NOTE
Pre-op Diagnosis: Vestibular schwannoma (HCC) [D33.3]    The above referenced H&P was reviewed by MATILDE Beltran on 4/15/2024, the patient was examined and no significant changes have occurred in the patient's condition since the H&P was performed.  I discussed with the patient and/or legal representative the potential benefits, risks and side effects of this procedure; the likelihood of the patient achieving goals; and potential problems that might occur during recuperation.  I discussed reasonable alternatives to the procedure, including risks, benefits and side effects related to the alternatives and risks related to not receiving this procedure.  We will proceed with procedure as planned.

## 2024-04-15 NOTE — ANESTHESIA PREPROCEDURE EVALUATION
PRE-OP EVALUATION    Patient Name: Irma Garcia    Admit Diagnosis: Vestibular schwannoma (HCC) [D33.3]    Pre-op Diagnosis: Vestibular schwannoma (HCC) [D33.3]    LEFT IMAGE GUIDED RETROSIGMOID CRANIECTOMY FOR TUMOR RESECTION, NEUROMONITORING    Anesthesia Procedure: LEFT IMAGE GUIDED RETROSIGMOID CRANIECTOMY FOR TUMOR RESECTION, NEUROMONITORING (Left)  INTRAOPERATIVE NEURO MONITORING (Left)  NAVIGATION SYSTEM NEURO (Left)    Surgeons and Role:     * Randall Liu MD - Primary    Pre-op vitals reviewed.  Temp: 98 °F (36.7 °C)  Pulse: 70  Resp: 18  BP: 113/85  SpO2: 97 %  Body mass index is 22.7 kg/m².    Current medications reviewed.  Hospital Medications:  • acetaminophen (Tylenol Extra Strength) tab 1,000 mg  1,000 mg Oral Once   • ceFAZolin (Ancef) 2 g in 20mL IV syringe premix  2 g Intravenous Once   • sodium chloride 0.9% infusion   Intravenous Continuous   • ceFAZolin (Ancef) 2 g/20mL IV syringe premix           Outpatient Medications:     Medications Prior to Admission   Medication Sig Dispense Refill Last Dose   • ALPRAZolam 0.25 MG Oral Tab Take 1 tablet (0.25 mg total) by mouth daily as needed. 30 tablet 0 4/14/2024 at 1900   • metoclopramide 10 MG Oral Tab Take 1 tablet (10 mg total) by mouth 3 (three) times daily as needed. 20 tablet 0 Past Week   • buPROPion  MG Oral Tablet 12 Hr Take 1 tablet (150 mg total) by mouth daily. 90 tablet 1 4/15/2024 at 0300   • azelastine 0.1 % Nasal Solution 2 sprays by Nasal route 2 (two) times daily. 30 mL 5 More than a month   • VITAMIN B COMPLEX-C OR Take 1 tablet by mouth daily.   More than a month   • Cholecalciferol (VITAMIN D-3) 5000 UNITS Oral Tab Take 1 tablet (5,000 Units total) by mouth.   More than a month       Allergies: Flagyl [metronidazole hcl] and Metronidazole      Anesthesia Evaluation    Patient summary reviewed.    Anesthetic Complications  (-) history of anesthetic complications         GI/Hepatic/Renal    Negative GI/hepatic/renal  ROS.                             Cardiovascular      ECG reviewed.  Exercise tolerance: good     MET: >4         (+) hyperlipidemia                                  Endo/Other    Negative endo/other ROS.                              Pulmonary    Negative pulmonary ROS.                (+) sleep apnea       Neuro/Psych      (+) depression  (+) anxiety                          Past Surgical History:   Procedure Laterality Date   • Colonoscopy  6/10/16   • D & c  99   • Endometrial ablation     • Tonsillectomy       Social History     Socioeconomic History   • Marital status:    Tobacco Use   • Smoking status: Former     Current packs/day: 0.00     Average packs/day: 0.3 packs/day for 9.0 years (2.7 ttl pk-yrs)     Types: Cigarettes     Start date:      Quit date:      Years since quittin.2   • Smokeless tobacco: Never   Vaping Use   • Vaping status: Never Used   Substance and Sexual Activity   • Alcohol use: Yes     Alcohol/week: 3.0 standard drinks of alcohol     Types: 3 Standard drinks or equivalent per week   • Drug use: Not Currently     Types: Cannabis     Comment: 1-2 times monthly; edibles/smoking   Other Topics Concern   • Caffeine Concern Yes   • Exercise Yes     History   Drug Use Unknown     Comment: 1-2 times monthly; edibles/smoking     Available pre-op labs reviewed.  Lab Results   Component Value Date    WBC 6.2 2024    WBC 5.9 2024    RBC 4.55 2024    RBC 4.71 2024    HGB 13.4 2024    HGB 14.2 2024    HCT 41.2 2024    HCT 42.0 2024    MCV 90.5 2024    MCV 89.2 2024    MCH 29.5 2024    MCH 30.1 2024    MCHC 32.5 2024    MCHC 33.8 2024    RDW 13.0 2024    RDW 12.0 2024    .0 2024     2024     Lab Results   Component Value Date     2024     2024    K 4.1 2024    K 4.3 2024     2024     2024    CO2 30.0  04/01/2024    CO2 32 02/21/2024    BUN 13 04/01/2024    BUN 15 02/21/2024    CREATSERUM 0.83 04/01/2024    CREATSERUM 0.82 02/21/2024    GLU 96 04/01/2024    GLU 87 02/21/2024    CA 9.5 04/01/2024    CA 9.6 02/21/2024     Lab Results   Component Value Date    INR 1.07 04/01/2024         Airway      Mallampati: II  Mouth opening: >3 FB  TM distance: > 6 cm  Neck ROM: full Cardiovascular    Cardiovascular exam normal.  Rhythm: regular  Rate: normal     Dental             Pulmonary    Pulmonary exam normal.  Breath sounds clear to auscultation bilaterally.               Other findings        ASA: 2   Plan: general  NPO status verified and patient meets guidelines.    Post-procedure pain management plan discussed with surgeon and patient.      Plan/risks discussed with: patient            Present on Admission:  **None**

## 2024-04-15 NOTE — ANESTHESIA POSTPROCEDURE EVALUATION
[facility]    Irma Garcia Patient Status:  Inpatient   Age/Gender 58 year old female MRN [id number]   Location [facility] SURGERY Attending Randall Liu MD   Hosp Day # 0 PCP Waylon Tong MD       Anesthesia Post-op Note    LEFT IMAGE GUIDED RETROSIGMOID CRANIECTOMY FOR TUMOR RESECTION, NEUROMONITORING    Procedure Summary       Date: 04/15/24 Room / Location:  MAIN OR 16 /  MAIN OR    Anesthesia Start: 0734 Anesthesia Stop: 1210    Procedures:       LEFT IMAGE GUIDED RETROSIGMOID CRANIECTOMY FOR TUMOR RESECTION, NEUROMONITORING (Left: Head)      INTRAOPERATIVE NEURO MONITORING (Left)      NAVIGATION SYSTEM NEURO (Left) Diagnosis:       Vestibular schwannoma (HCC)      (Vestibular schwannoma (HCC) [D33.3])    Surgeons: Randall Liu MD Anesthesiologist: Yung Diamond MD    Anesthesia Type: general ASA Status: 2            Anesthesia Type: general    Vitals Value Taken Time   /83 04/15/24 1208   Temp 98.3 04/15/24 1212   Pulse 100 04/15/24 1212   Resp 11 04/15/24 1212   SpO2 100 % 04/15/24 1212   Vitals shown include unfiled device data.    Patient Location: PACU    Anesthesia Type: general    Airway Patency: extubated    Postop Pain Control: adequate    Mental Status: mildly sedated but able to meaningfully participate in the post-anesthesia evaluation    Nausea/Vomiting: none    Cardiopulmonary/Hydration status: stable euvolemic    Complications: no apparent anesthesia related complications    Postop vital signs: stable    Dental Exam: Unchanged from Preop    Patient to be discharged from PACU when criteria met.

## 2024-04-15 NOTE — ANESTHESIA PROCEDURE NOTES
Arterial Line    Date/Time: 4/15/2024 7:47 AM    Performed by: Yung Diamond MD  Authorized by: Yung Diamond MD    General Information and Staff    Procedure Start:  4/15/2024 7:47 AM  Procedure End:  4/15/2024 7:52 AM  Anesthesiologist:  Yung Diamond MD  Performed By:  Anesthesiologist  Patient Location:  OR  Indication: continuous blood pressure monitoring and blood sampling needed    Site Identification: surface landmarks    Preanesthetic Checklist: 2 patient identifiers, IV checked, risks and benefits discussed, monitors and equipment checked, pre-op evaluation, timeout performed, anesthesia consent and sterile technique used    Procedure Details    Catheter Size:  20 G  Catheter Length:  1 and 3/4 inch  Catheter Type:  Arrow  Seldinger Technique?: Yes    Laterality:  Right  Site:  Radial artery  Site Prep: chlorhexidine    Line Secured:  Wrist Brace, tape and Tegaderm    Assessment    Events: patient tolerated procedure well with no complications      Medications  4/15/2024 7:47 AM      Additional Comments

## 2024-04-15 NOTE — PLAN OF CARE
Pt from recovery room this afternoon just after 1400. S/p crani for tumor. Left side facial weakness noted, facial droop, unable to close left eye completely, dizziness with nystagmus. Pt alert and oriented, pretty \"miserable\". C/o headache, incisional pain and anxiety. Neuro check q1h. VSS. NSR on monitor. BP stable. Taking clear liquids but had an episode of nausea this evening and emesis x1. Zofran given. Xanax ordered and given for anxiety however pt threw up right after receiving. Dilaudid for pain control. Family at bedside, questions encouraged and answered. Emotional support given.     Problem: SKIN/TISSUE INTEGRITY - ADULT  Goal: Skin integrity remains intact  Description: INTERVENTIONS  - Assess and document risk factors for pressure ulcer development  - Assess and document skin integrity  - Monitor for areas of redness and/or skin breakdown  - Initiate interventions, skin care algorithm/standards of care as needed  Outcome: Progressing     Problem: NEUROLOGICAL - ADULT  Goal: Achieves stable or improved neurological status  Description: INTERVENTIONS  - Assess for and report changes in neurological status  - Initiate measures to prevent increased intracranial pressure  - Maintain blood pressure and fluid volume within ordered parameters to optimize cerebral perfusion and minimize risk of hemorrhage  - Monitor temperature, glucose, and sodium. Initiate appropriate interventions as ordered  Outcome: Progressing

## 2024-04-15 NOTE — BRIEF OP NOTE
Pre-Operative Diagnosis: Vestibular schwannoma (HCC) [D33.3]     Post-Operative Diagnosis: Vestibular schwannoma (HCC) [D33.3]      Procedure Performed:   LEFT IMAGE GUIDED RETROSIGMOID CRANIECTOMY FOR TUMOR RESECTION, NEUROMONITORING    Surgeons and Role:     * Randall Liu MD - Primary    Assistant(s):  APN: Ann Bragg APN     Surgical Findings: see dictation     Specimen: Left CP angle tumor     Estimated Blood Loss: Blood Output: 50 mL (4/15/2024 11:42 AM)          Randall Liu MD  4/15/2024  12:13 PM

## 2024-04-15 NOTE — ANESTHESIA PROCEDURE NOTES
Peripheral IV  Date/Time: 4/15/2024 7:55 AM  Inserted by: Yung Diamond MD    Placement  Needle size: 18 G  Laterality: right  Location: hand  Site prep: alcohol  Attempts: 1

## 2024-04-15 NOTE — OPERATIVE REPORT
Operative Note    Patient Name: Irma Garcia    Preoperative Diagnosis: Vestibular schwannoma (HCC) [D33.3]    Postoperative Diagnosis: same    Primary Surgeon: Randall Liu MD    Assistant: MATILDE Beltran, was essential the case including opening, closing and retraction    Procedures: Left image guided retrosigmoid craniectomy for resection of tumor, neuromonitoring, use of microscope    Surgical Findings: See dictation    Anesthesia: General    Complications: none    Implants: Frederic plates and screws, DuraGen onlay    Specimen: Left CP angle tumor    Drains: None    Condition: Stable    Estimated Blood Loss: 50 mL    Indication for Procedure: 58-year-old female who presented to neurology clinic with finding of a new tumor PEEP.  Patient is being worked up for facial issues as well as dizziness and imbalance.  Patient found to have a large left CP angle tumor.  Patient was seen in clinic.  After the discussion patient wished to proceed with surgical resection of the tumor and decompression of the cyst.  Patient plan for surgery.    Procedure: Patient properly identified brought back to the OR 16.  Patient placed on the OR table.  Patient placed under general anesthesia by anesthesia staff.  Brown attached to the patient's head.  Patient was then positioned and all pressure points padded.  The Brown was attached to the OR table.  Neuromonitoring was applied.  The navigation was then registered to the patient.  The incision was then planned out.  Patient was then sterilely prepped and draped in usual fashion.  10 mL of quarter percent Marcaine with epinephrine was given as local anesthetic.  Incision was made dissection down to fascia.  A subperiosteal dissection was then performed.  Retractors were placed.  Navigation was then used to plan out our craniectomy site.  The high-speed drill then used to thin out the bone.  We then used Kerrisons to finish off our craniectomy.  Navigation confirmed  the correct position.  The dura was then opened with a 15 blade.  The dura was then opened in a cruciate fashion.  We then released CSF.  The brain relax nicely.  The microscope was brought to the field.  With mild retraction of the cerebellum were able to encounter the tumor.  This was clearly evident.  Nerves IX to XI were stimulated.  Nerve VII was unseen at the moment.  We then retracted the cerebellum a little more.  We are able to start seeing more tumor.  The tumor was then stimulated.  No nerve function was seen.  The capsule of the tumor was then coagulated.  We then entered the tumor and wrist decompressed the tumor.  The tumor became more slack.  We able to start retracting around the edges with better dissection planes.  We then able to retract superiorly and and able to find the 7th nerve.  This was stimulated several times.  We able to dissect the tumor off the similar freely.  The 7th nerve stayed continuity.  There was never any EMG firings of the nerve.  Were able to resect as much tumor as possible.  We are able to actually get into the cyst and decompress that as well.  We searched in all directions due to navigation confirmed around the tumor in all directions.  We did not attempt to remove the capsule off the brainstem.  We stimulated 7 again noted be working well.  Hemostasis was achieved.  Surgicel was placed in the operative bed.  The dura was then closed with 4-0 Nurolon.  A DuraGen onlay was placed over this.  A Brooks mesh was then screwed into position.  This was covered with Vistaseal.  The wound was then closed in layers.  The Brown was attached to the patient's head.  The patient was awakened by anesthesia and taken recovery.    Neuromonitoring was at baseline through the entire case with no EMG firing of 7 throughout the case.  Stimulation of nerve was appropriate throughout the case.  Dictated not proofread

## 2024-04-15 NOTE — CONSULTS
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     Past Medical History:    Abnormal maternal glucose tolerance, complicating pregnancy, childbirth, or the puerperium, unspecified as to episode of care    Acute upper respiratory infections of unspecified site    Allergic rhinitis, cause unspecified    Anxiety    Carpal tunnel syndrome    HERNAN (obstructive sleep apnea)    AHI-5    Other B-complex deficiencies    Sleep apnea    Mild sleep apnea - no treatment    Unspecified vitamin D deficiency    Visual impairment    glasses       Past Surgical History:   Procedure Laterality Date    Colonoscopy  6/10/16    D & c  99    Endometrial ablation      Tonsillectomy         Medications Prior to Admission   Medication Sig Dispense Refill Last Dose    ALPRAZolam 0.25 MG Oral Tab Take 1 tablet (0.25 mg total) by mouth daily as needed. 30 tablet 0 2024 at 1900    metoclopramide 10 MG Oral Tab Take 1 tablet (10 mg total) by mouth 3 (three) times daily as needed. 20 tablet 0 Past Week    buPROPion  MG Oral Tablet 12 Hr Take 1 tablet (150 mg total) by mouth daily. 90 tablet 1 4/15/2024 at 0300    azelastine 0.1 % Nasal Solution 2 sprays by Nasal route 2 (two) times daily. 30 mL 5 More than a month    VITAMIN B COMPLEX-C OR Take 1 tablet by mouth daily.   More than a month    Cholecalciferol (VITAMIN D-3) 5000 UNITS Oral Tab Take 1 tablet (5,000 Units total) by mouth.   More than a month      Allergies   Allergen Reactions    Flagyl [Metronidazole Hcl] HIVES and SWELLING    Metronidazole HIVES     Social History     Socioeconomic History    Marital status:    Tobacco Use    Smoking status: Former     Current packs/day: 0.00     Average packs/day: 0.3 packs/day for 9.0 years (2.7 ttl pk-yrs)     Types: Cigarettes     Start date:      Quit date:      Years since quittin.2    Smokeless tobacco: Never   Vaping Use    Vaping status: Never Used   Substance and Sexual Activity    Alcohol use: Yes     Alcohol/week: 3.0 standard drinks of alcohol     Types: 3 Standard drinks or equivalent per week    Drug use: Not Currently     Types: Cannabis     Comment: 1-2 times monthly; edibles/smoking   Other Topics Concern    Caffeine Concern Yes    Exercise Yes     Family History   Problem Relation Age of Onset    Other (type 2 diabetes) Father        Current Meds:  Current Facility-Administered Medications   Medication Dose Route Frequency    [Transfer Hold] acetaminophen (Tylenol Extra Strength) tab 1,000 mg  1,000 mg Oral Once    sodium chloride 0.9% infusion   Intravenous Continuous    ceFAZolin (Ancef) 2 g/20mL IV syringe premix        bupivacaine 0.25%-EPINEPHrine 1:200,000 PF (Marcaine/Epinephrine PF) injection    PRN    gelatin adsorbable (Gelfoam) 100 external spone    PRN    Gelatin Absorbable (GELFOAM) powder POWD    PRN    thrombin (Thrombin-JMI) 79645 units topical solution    PRN     Facility-Administered Medications Ordered in Other Encounters   Medication Dose Route Frequency    midazolam (Versed) 2 MG/2ML injection   Intravenous PRN    fentaNYL (Sublimaze) 50 mcg/mL injection   Intravenous PRN    propofol (Diprivan) 10 MG/ML injection   Intravenous PRN    rocuronium (Zemuron) 50 mg/5mL injection   Intravenous PRN    dexamethasone (Decadron) 4 MG/ML injection   Intravenous PRN    phenylephrine (James-Synephrine) 10 MG/ML injection   Intravenous PRN    Lidocaine HCl (LARYNG-O-SET) 4 %  solution   Endotracheal PRN    sodium chloride 0.9% infusion   Intravenous Continuous PRN    mannitol (Osmitrol) 20% IVPB premix   Intravenous PRN    propofol (Diprivan) 10 mg/mL infusion premix   Intravenous Continuous PRN    phenylephrine (James-Synephrine) 50 mg in sodium chloride 0.9% 250 mL infusion   Intravenous Continuous PRN       Review of Systems:     Constitutional:    Denies unusual weight loss or weight gain, fever/chills or night sweats.  HEENT:                Denies changes in vision or difficulty swallowing.  Pulm:                    Denies dyspnea, cough, or sputum production  Cardiac:               Denies chest pain, palpitations or lower extremity edema.  GI:                         Denies constipation, heartburn or melena.  :                       Denies dysuria or hematuria.  Skin:                     Denies rashes or open areas.  Neuro:                  As per HPI    All other systems were reviewed and are negative.    Vitals:   Temp:  [98 °F (36.7 °C)] 98 °F (36.7 °C)  Pulse:  [70] 70  Resp:  [18] 18  BP: (113)/(85) 113/85  SpO2:  [97 %] 97 %  Body mass index is 22.7 kg/m².    Intake/Output:    Intake/Output Summary (Last 24 hours) at 4/15/2024 1049  Last data filed at 4/15/2024 1028  Gross per 24 hour   Intake 1000 ml   Output --   Net 1000 ml       Physical Examination:   General- No acute distress  CV- RRR  Resp- CTA Bilat  Neuro-  Mental status- awake and alert, regards and follows commands, oriented x3, speech fluent  Cranial nerves- pupils equally round and reactive to light, extraocular muscles intact, +L facial weakness  Motor- 5/5 throughout  Sens-  Intact to light touch    Diagnostics:   No results found.    Lab Review   No results for input(s): \"NA\", \"K\", \"CL\", \"CO2\", \"GLU\", \"BUN\", \"CREATSERUM\" in the last 168 hours.  No results for input(s): \"WBC\", \"HGB\", \"PLT\" in the last 168 hours.    Assesment/Plan:     Neuro:  L cerebello-pontine angle mass -c/f vestibular schwannoma  Now s/p  elective L retrosigmoid crani for tumor resection.   Postop and steroids per Neurosurg.   Cont neurochecks/pt/ot/st.  Cardiac:  sbp goal 100-150  Pulmonary:  HERNAN- stable on RA  Renal:  IVFs, monitor BUN/Cr  GI:  PO as tolerated  Heme/ID:  Afebrile, no leukocytosis  Endocrine/Rheum:  Monitor glucose, sliding scale insulin prn  DVT Prophylaxis:  SCD’s    Goals of the Day: neurochecks   A total of 45 minutes of critical care time (exclusive of billable procedures) was administered to manage and/or prevent neurologic instability. This involved direct patient intervention, complex decision making, and/or extensive discussions with the patient, family, and clinical staff.    Thank you for allowing me to participate in the care of this patient.     Kin Olivares MD, [facility]  Medical Director of System Neurosciences  Chief, Section of Neurocritical Care  [facility]

## 2024-04-15 NOTE — CONSULTS
[place] HOSPITALIST  CONSULT     Irma Garcia Patient Status:  Inpatient    1965 MRN [id number]   Location [facility] 6NE-A Attending Randall Liu MD   Hosp Day # 0 PCP Waylon Tong MD     Reason for consult: Medical management    Requested by: Randall Campbell    History of Present Illness: Irma Garcia is a 58 year old female with PMHx HERNAN, restless leg syndrome, hyperlipidemia, who presents for elective resection of vestibular schwannoma.     Patient with a history of dizziness, left-sided facial numbness found to have vestibular schwannoma on the left side.  Underwent left-sided craniectomy for tumor resection today with neurosurgery.  Patient tolerated procedure well though quite anxious postop, notes that she does have a history of anxiety, having nausea, vomiting, increased shortness of breath.  Patient also noted to have left-sided facial droop following surgery.    Past Medical History:  Past Medical History:    Abnormal maternal glucose tolerance, complicating pregnancy, childbirth, or the puerperium, unspecified as to episode of care    Acute upper respiratory infections of unspecified site    Allergic rhinitis, cause unspecified    Anxiety    Carpal tunnel syndrome    HERNAN (obstructive sleep apnea)    AHI-5    Other B-complex deficiencies    Sleep apnea    Mild sleep apnea - no treatment    Unspecified vitamin D deficiency    Visual impairment    glasses        Past Surgical History:   Past Surgical History:   Procedure Laterality Date    Colonoscopy  6/10/16    D & c  99    Endometrial ablation      Tonsillectomy         Social History:  reports that she quit smoking about 3 months ago. Her smoking use included cigarettes. She started smoking about 9 years ago. She has a 2.7 pack-year smoking history. She has never used smokeless tobacco. She reports current alcohol use of about 3.0 standard drinks of alcohol per week. She reports that she does not currently use drugs  after having used the following drugs: Cannabis.    Family History:   Family History   Problem Relation Age of Onset    Other (type 2 diabetes) Father        Allergies:   Allergies   Allergen Reactions    Flagyl [Metronidazole Hcl] HIVES and SWELLING    Metronidazole HIVES       Medications:    Current Facility-Administered Medications on File Prior to Encounter   Medication Dose Route Frequency Provider Last Rate Last Admin    [COMPLETED] gadoterate meglumine (Dotarem) 10 MMOL/20ML injection 20 mL  20 mL Intravenous ONCE PRN Waylon Tong MD   20 mL at 03/18/24 0849     Current Outpatient Medications on File Prior to Encounter   Medication Sig Dispense Refill    buPROPion  MG Oral Tablet 12 Hr Take 1 tablet (150 mg total) by mouth daily. 90 tablet 1    VITAMIN B COMPLEX-C OR Take 1 tablet by mouth daily.      Cholecalciferol (VITAMIN D-3) 5000 UNITS Oral Tab Take 1 tablet (5,000 Units total) by mouth.         Review of Systems:   A comprehensive 14 point review of systems was completed.    Pertinent positives and negatives noted in the HPI.    Physical Exam:    BP (!) 126/91   Pulse 87   Temp 97.9 °F (36.6 °C) (Temporal)   Resp 11   Ht 4' 10\" (1.473 m)   Wt 108 lb 9.6 oz (49.3 kg)   LMP  (LMP Unknown)   SpO2 96%   BMI 22.70 kg/m²   General: No acute distress. Alert and oriented x 3.  HEENT: Normocephalic atraumatic. Moist mucous membranes. EOM-I. PERRLA. Anicteric.  Neck: No lymphadenopathy. No JVD. No carotid bruits.  Respiratory: Clear to auscultation bilaterally. No wheezes. No rhonchi.  Cardiovascular: S1, S2. Regular rate and rhythm. No murmurs, rubs or gallops. Equal pulses.   Chest and Back: No tenderness or deformity.  Abdomen: Soft, nontender, nondistended.  Positive bowel sounds. No rebound, guarding or organomegaly.  Neurologic: Left-sided facial droop involving the left forehead.  Other cranial nerves intact.  Musculoskeletal: Moves all extremities.  Extremities: No edema or  cyanosis.  Integument: No rashes or lesions.   Psychiatric: Appropriate mood and affect.      Diagnostic Data:      Labs:  No results for input(s): \"WBC\", \"HGB\", \"MCV\", \"PLT\", \"BAND\", \"INR\" in the last 168 hours.    Invalid input(s): \"LYM#\", \"MONO#\", \"BASOS#\", \"EOSIN#\"    No results for input(s): \"GLU\", \"BUN\", \"CREATSERUM\", \"GFRAA\", \"GFRNAA\", \"CA\", \"ALB\", \"NA\", \"K\", \"CL\", \"CO2\", \"ALKPHO\", \"AST\", \"ALT\", \"BILT\", \"TP\" in the last 168 hours.    No results for input(s): \"PTP\", \"INR\" in the last 168 hours.    No results for input(s): \"TROP\", \"CK\" in the last 168 hours.    Imaging: Imaging data reviewed in Epic.      ASSESSMENT / PLAN:     # Left Vestibular schwannoma s/p resection  # Bell's palsy on left     - Primary management per neurosurgery     # Anxiety - Ativan PRN   # Obstructive Sleep Apnea - CPAP at night per protocol    # RLS   # Hyperlipidemia - diet controlled    # Tobacco use - continue wellbutrin, nicotine patch     Quality:  DVT Prophylaxis: SCDs  CODE status: FULL  Leroy: Per surgery    Plan of care discussed with Pt, RN    Paul Garcia MD  4/15/2024

## 2024-04-15 NOTE — ANESTHESIA PROCEDURE NOTES
Airway  Date/Time: 4/15/2024 7:44 AM  Urgency: elective      General Information and Staff    Patient location during procedure: OR  Anesthesiologist: Yung Diamond MD  Performed: anesthesiologist   Performed by: Yung Diamond MD  Authorized by: Yung Daimond MD      Indications and Patient Condition  Indications for airway management: anesthesia  Sedation level: deep  Preoxygenated: yes  Patient position: sniffing  Mask difficulty assessment: 1 - vent by mask    Final Airway Details  Final airway type: endotracheal airway      Successful airway: ETT  Cuffed: yes   Successful intubation technique: direct laryngoscopy  Endotracheal tube insertion site: oral  Blade: Shanique  Blade size: #3  ETT size (mm): 7.0    Cormack-Lehane Classification: grade I - full view of glottis  Placement verified by: capnometry   Measured from: lips  ETT to lips (cm): 20  Number of attempts at approach: 1

## 2024-04-15 NOTE — SLP NOTE
ADULT SWALLOWING EVALUATION    ASSESSMENT    ASSESSMENT/OVERALL IMPRESSION:  Patient is a 58 year old female admitted on this date for a craniectomy for tumor resection with dx of vestibular schwannoma. Patient with vertigo since 2020. She reports to this writer that facial numbness starting 1 year ago. Today's assessment of oral motor mechanism exam revealed left facial droop at rest and on protrusion and retraction with intact labial and lingual movements with report of some labial numbness on the left. Speech is clear. Facial droop is new.   Assessed patient with thin liquids via spoon, cup, and straw, puree, and solids.   Oral phase revealed functional oral acceptance, retrieval and mastication of solids with timely and mild difficulty of propulsion of solids with mild residue on the left side of lip which cleared with liquid wash and patient able to wipe clear.   Pharyngeal phase revealed an apparent timely initiation of the pharyngeal phase with functional hyolaryngeal elevation on palpation. No coughing or throat clearing. Patient presents with functional oral phase and apparent intact pharyngeal phase of swallow without any overt clinical s/s of aspiration. Recommended to patient to utilize compensatory strategies to facilitate A-P movement of the bolus.     Will follow up to ensure safety with diet and educate pt on compensatory strategies/swallow precautions. Collaborated with RN who was in agreement. Patient and patient's significant other were educated on results and recommendations with understanding verbalized.          RECOMMENDATIONS   Diet Recommendations - Solids: Regular  Diet Recommendations - Liquids: Thin Liquids        Compensatory Strategies Recommended: Small bites and sips (place food and liquid on the right side of mouth)  Aspiration Precautions: Upright position;Slow rate (placement on the right side)  Medication Administration Recommendations: One pill at a time  Treatment  Plan/Recommendations: Aspiration precautions (meal monitor)    HISTORY   MEDICAL HISTORY  Reason for Referral: RN dysphagia screen    Problem List  Active Problems:    Vestibular schwannoma (HCC)      Past Medical History  Past Medical History:    Abnormal maternal glucose tolerance, complicating pregnancy, childbirth, or the puerperium, unspecified as to episode of care    Acute upper respiratory infections of unspecified site    Allergic rhinitis, cause unspecified    Anxiety    Carpal tunnel syndrome    HERNAN (obstructive sleep apnea)    AHI-5    Other B-complex deficiencies    Sleep apnea    Mild sleep apnea - no treatment    Unspecified vitamin D deficiency    Visual impairment    glasses       Prior Living Situation: Home alone  Diet Prior to Admission: Regular;Thin liquids  Precautions: Aspiration    Patient/Family Goals: To eat and drink    SWALLOWING HISTORY  Current Diet Consistency: NPO  Dysphagia History: No past history reported  Imaging Results:   CT of Brain on 3/28/24:  1. No acute intracranial findings.    2. Stable hypodense lobulated mass centered in the left cerebellopontine angle  SUBJECTIVE       OBJECTIVE   ORAL MOTOR EXAMINATION  Dentition: Natural;Functional  Symmetry: Reduced left facial  Strength: Reduced left facial  Tone: Reduced left facial  Range of Motion: Within Functional Limits  Rate of Motion: Within Functional Limits    Voice Quality: Clear  Respiratory Status: Unlabored  Consistencies Trialed: Thin liquids;Puree;Hard solid  Method of Presentation: Self presentation;Spoon;Cup;Straw;Single sips;Consecutive swallows  Patient Positioning: Upright;Midline    Oral Phase of Swallow: Within Functional Limits despite facial droop.     Pharyngeal Phase of Swallow: Within Functional Limits           (Please note: Silent aspiration cannot be evaluated clinically. Videofluoroscopic Swallow Study is required to rule-out silent aspiration.)    Esophageal Phase of Swallow: No complaints  consistent with possible esophageal involvement  Comments:               GOALS  Goal #1 The patient will tolerate regular consistency and thin liquids without overt signs or symptoms of aspiration with 95 % accuracy over 1-2 session(s).  In Progress   Goal #2 The patient/family/caregiver will demonstrate understanding and implementation of aspiration precautions and swallow strategies independently over 1-2 session(s).    In Progress     FOLLOW UP  Treatment Plan/Recommendations: Aspiration precautions (meal monitor)  Number of Visits to Meet Established Goals: 2  Follow Up Needed (Documentation Required): Yes  SLP Follow-up Date: 04/16/24    Thank you for your referral.   If you have any questions, please contact [facility]

## 2024-04-16 PROBLEM — Z11.52 ENCOUNTER FOR PREPROCEDURE SCREENING LABORATORY TESTING FOR COVID-19: Status: ACTIVE | Noted: 2024-04-16

## 2024-04-16 PROBLEM — Z01.812 ENCOUNTER FOR PREPROCEDURE SCREENING LABORATORY TESTING FOR COVID-19: Status: ACTIVE | Noted: 2024-04-16

## 2024-04-16 LAB
ANION GAP SERPL CALC-SCNC: 4 MMOL/L (ref 0–18)
BUN BLD-MCNC: 12 MG/DL (ref 9–23)
CALCIUM BLD-MCNC: 8.3 MG/DL (ref 8.5–10.1)
CHLORIDE SERPL-SCNC: 109 MMOL/L (ref 98–112)
CO2 SERPL-SCNC: 25 MMOL/L (ref 21–32)
CREAT BLD-MCNC: 0.68 MG/DL
EGFRCR SERPLBLD CKD-EPI 2021: 102 ML/MIN/1.73M2 (ref 60–?)
ERYTHROCYTE [DISTWIDTH] IN BLOOD BY AUTOMATED COUNT: 12.5 %
GLUCOSE BLD-MCNC: 116 MG/DL (ref 70–99)
GLUCOSE BLD-MCNC: 121 MG/DL (ref 70–99)
GLUCOSE BLD-MCNC: 122 MG/DL (ref 70–99)
GLUCOSE BLD-MCNC: 126 MG/DL (ref 70–99)
GLUCOSE BLD-MCNC: 131 MG/DL (ref 70–99)
HCT VFR BLD AUTO: 35 %
HGB BLD-MCNC: 11.6 G/DL
MCH RBC QN AUTO: 29 PG (ref 26–34)
MCHC RBC AUTO-ENTMCNC: 33.1 G/DL (ref 31–37)
MCV RBC AUTO: 87.5 FL
OSMOLALITY SERPL CALC.SUM OF ELEC: 288 MOSM/KG (ref 275–295)
PLATELET # BLD AUTO: 230 10(3)UL (ref 150–450)
POTASSIUM SERPL-SCNC: 3.9 MMOL/L (ref 3.5–5.1)
RBC # BLD AUTO: 4 X10(6)UL
SODIUM SERPL-SCNC: 138 MMOL/L (ref 136–145)
WBC # BLD AUTO: 12.3 X10(3) UL (ref 4–11)

## 2024-04-16 PROCEDURE — 99232 SBSQ HOSP IP/OBS MODERATE 35: CPT | Performed by: INTERNAL MEDICINE

## 2024-04-16 PROCEDURE — 99291 CRITICAL CARE FIRST HOUR: CPT | Performed by: INTERNAL MEDICINE

## 2024-04-16 NOTE — PLAN OF CARE
Assumed care this morning. Pt oob in chair c/o \"feeling like shit\". Tired, dizzy, nausea, headache. Left facial droop remains. Left eye not able to close all the way. Facial sensation improved. Nystagmus remains in both eyes but improved from yesterday. Neuro checks now q4h. NSR on monitor, BP stable. RA withs sats >95%. Able to work with PT/OT, ambulating in halls but threw up after walking. Zofran given. Not able to eat this morning but attempting to take more this afternoon. Oxy given for pain and xanax given for anxiety. Boyfriend at bedside. Questions encouraged and answered. Awaiting MRI.     Problem: SKIN/TISSUE INTEGRITY - ADULT  Goal: Skin integrity remains intact  Description: INTERVENTIONS  - Assess and document risk factors for pressure ulcer development  - Assess and document skin integrity  - Monitor for areas of redness and/or skin breakdown  - Initiate interventions, skin care algorithm/standards of care as needed  Outcome: Progressing     Problem: NEUROLOGICAL - ADULT  Goal: Achieves stable or improved neurological status  Description: INTERVENTIONS  - Assess for and report changes in neurological status  - Initiate measures to prevent increased intracranial pressure  - Maintain blood pressure and fluid volume within ordered parameters to optimize cerebral perfusion and minimize risk of hemorrhage  - Monitor temperature, glucose, and sodium. Initiate appropriate interventions as ordered  Outcome: Progressing

## 2024-04-16 NOTE — PROGRESS NOTES
[facility]  Neurosurgery Progress Note    Irma Garcia Patient Status:  Inpatient    1965 MRN [id number]   Location [facility] 6NE-A Attending Randall Liu MD   Hosp Day # 1 PCP Waylon Tong MD     Chief Complaint:  No chief complaint on file.      Subjective:  Pt examined, reports expected post op pain.  Pt had nausea and vomiting yesterday and anxiety which is improved today.  Pt has new left facial weakness from surgery.  Today she is unable to fully close her left eye.  No other new complaints    Objective/Physical Exam:    Vital Signs:  Blood pressure 102/73, pulse 87, temperature 98 °F (36.7 °C), temperature source Temporal, resp. rate 14, height 58\", weight 110 lb 4.8 oz (50 kg), SpO2 97%, not currently breastfeeding.    Respiratory:  Respirations nonlabored    CV:  NSR on tele    General: NAD, Speech Fluent, Mood Appropriate    Neurologic: This patient is alert and orientated x 3.  Able to follow commands.  L facial droop.  Remainder of CN 2-12 GI,  Sensation to light touch is intact bilaterally.  BROWN x 4.  Gait deferred    Skin: Surgical incision approximated with glue       Labs:  Lab Results   Component Value Date    WBC 12.3 2024    HGB 11.6 2024    HCT 35.0 2024    .0 2024    CREATSERUM 0.68 2024    BUN 12 2024     2024    K 3.9 2024     2024    CO2 25.0 2024     2024    CA 8.3 2024    PGLU 122 2024       Imaging:  Mri brain pending    Assessment:  S/p L retrosigmoid craniectomy for tumor resection POD #1  L Facial nerve palsy    Plan:  Pt seen and examined with Dr. Liu  Neuro checks q 4  Medical management per hospitalist  DVT prophylaxis SCDs TEDs  Pain medications as ordered  MRI brain as ordered  PT/OT/ST evals  Decadron 4 q 6  Dr. Liu discussed with patient and spouse.  Transient facial nerve palsy after vestibular schwannoma resection is not uncommon and  spontaneously resolves in the majority of patient.  Pt and spouse verbalized understanding        MATILDE Beltran  [facility]  4/16/2024, 8:31 AM

## 2024-04-16 NOTE — PROGRESS NOTES
[facility]   part of [facility]     Hospitalist Progress Note     Irma Garcia Patient Status:  Inpatient    1965 MRN [id number]   Location [facility] 6NE-A Attending Randall Liu MD   Hosp Day # 1 PCP Waylon Tong MD     Reason for consult: Medical management     Requested by: Randall Campbell    Subjective:     Patient feeling well, pain controlled, passing flatus. L facial droop    Objective:    Review of Systems:   A comprehensive review of systems was completed; pertinent positive and negatives stated in subjective.    Vital signs:  Temp:  [97.8 °F (36.6 °C)-98.7 °F (37.1 °C)] 98.3 °F (36.8 °C)  Pulse:  [] 77  Resp:  [8-21] 11  BP: ()/(53-97) 101/71  SpO2:  [93 %-100 %] 96 %  AO: ()/() 88/77    Physical Exam:    General: No acute distress  Respiratory: No wheezes, no rhonchi  Cardiovascular: S1, S2, regular rate and rhythm  Abdomen: Soft, Non-tender, non-distended, positive bowel sounds  Neuro: No new focal deficits. +L facial droop; L sided surgical incision c/d/I with staples   Extremities: No edema    Diagnostic Data:    Labs:  Recent Labs   Lab 24  0417   WBC 12.3*   HGB 11.6*   MCV 87.5   .0       Recent Labs   Lab 24  0417   *   BUN 12   CREATSERUM 0.68   CA 8.3*      K 3.9      CO2 25.0       Estimated Creatinine Clearance: 72 mL/min (based on SCr of 0.68 mg/dL).    No results for input(s): \"TROP\", \"TROPHS\", \"CK\" in the last 168 hours.    No results for input(s): \"PTP\", \"INR\" in the last 168 hours.     Microbiology    No results found for this visit on 04/15/24.      Imaging: Reviewed in Epic.    Medications:    acetaminophen  1,000 mg Intravenous Q6H    famotidine  20 mg Oral BID    Or    famotidine  20 mg Intravenous BID    dexamethasone  4 mg Intravenous Q6H    buPROPion SR  150 mg Oral Daily    nicotine  1 patch Transdermal Daily       Assessment & Plan:      # Left Vestibular schwannoma   - s/p elective  L retrosigmoid crani/resection on 4/15  - NS primary  - pain control  - PT/OT  - DVT ppx per primary service    # Bell's palsy on left     - Primary management per neurosurgery    #Leukocytosis, suspect reactive    #Hyperglycemia, suspect reactive      # Anxiety   - Ativan PRN, bupropion     # Obstructive Sleep Apnea   - CPAP at night per protocol      # RLS     # Hyperlipidemia - diet controlled      # Tobacco use   - continue wellbutrin, nicotine patch       Haylie Cruz, DO    Supplementary Documentation:     Quality:  DVT Mechanical Prophylaxis: ARELIS hose, MARIA Ts,    DVT Pharmacologic Prophylaxis   Medication   None                Code Status: Full Code  Leroy: No urinary catheter in place    The 21st Century Cures Act makes medical notes like these available to patients in the interest of transparency. Please be advised this is a medical document. Medical documents are intended to carry relevant information, facts as evident, and the clinical opinion of the practitioner. The medical note is intended as peer to peer communication and may appear blunt or direct. It is written in medical language and may contain abbreviations or verbiage that are unfamiliar.

## 2024-04-16 NOTE — OCCUPATIONAL THERAPY NOTE
OCCUPATIONAL THERAPY EVALUATION - INPATIENT     Room Number: 6601/6601-A  Evaluation Date: 4/16/2024  Type of Evaluation: Initial  Presenting Problem: 4/15 LEFT IMAGE GUIDED RETROSIGMOID CRANIECTOMY FOR TUMOR RESECTION    Physician Order: IP Consult to Occupational Therapy  Reason for Therapy: ADL/IADL Dysfunction and Discharge Planning    OCCUPATIONAL THERAPY ASSESSMENT   Patient is currently functioning near baseline with toileting, lower body dressing, and transfers. Prior to admission, patient's baseline is independent.  Patient is requiring stand-by assist and contact guard assist as a result of the following impairments:  dizziness . Occupational Therapy will continue to follow for duration of hospitalization.    Patient will benefit from continued skilled OT Services For duration of hospitalization, however, given the patient is functioning near baseline level do not anticipate skilled therapy needs at discharge       History Related to Current Admission: Patient is a 58 year old female admitted on 4/15/2024 with Presenting Problem: 4/15 LEFT IMAGE GUIDED RETROSIGMOID CRANIECTOMY FOR TUMOR RESECTION. Co-Morbidities : vertigo, ADD, neuroma.    Surgery 4/16  Pre-Operative Diagnosis: Vestibular schwannoma (HCC) [D33.3]  Post-Operative Diagnosis: Vestibular schwannoma (HCC) [D33.3]    Procedure Performed:   LEFT IMAGE GUIDED RETROSIGMOID CRANIECTOMY FOR TUMOR RESECTION, NEUROMONITORING        WEIGHT BEARING RESTRICTION  Weight Bearing Restriction: None                Recommendations for nursing staff:   Transfers: supervision  Toileting location: toilet    EVALUATION SESSION:  Patient Start of Session: seated  FUNCTIONAL TRANSFER ASSESSMENT  Sit to Stand: Chair  Chair: Supervision  Toilet Transfer: Supervision    BED MOBILITY  Supine to Sit : Not tested  Sit to Supine (OT): Supervision    O2 SATURATIONS  Oxygen Therapy  SPO2% on Room Air at Rest: 97    COGNITION  Overall Cognitive Status:  WFL - within functional  limits    Upper Extremity   ROM: within functional limits   Strength: within functional limits   Coordination  Gross motor:   Fine motor: intact  Sensation: Light touch:  intact    EDUCATION PROVIDED  Patient: Role of Occupational Therapy; Plan of Care; Compensatory ADL Techniques  Patient's Response to Education: Returned Demonstration; Verbalized Understanding    Equipment used: rw       Therapist comments: seated in the chair.  present during the session.  Per pt and , pt has had ongoing \"vertigo\" dizziness since 2020.  Fine motor intact. Able to read the clock. Per pt, she typically sees things better with glasses (don't have one today). Pt was observed squinting her R eye intermittently when she was ambulating in the hallway.  Pt denied double vision. During horizontal saccades, slight nystagmus noted when pt was tracking from midline to R. Educated the pt about gaze stabilization.  Supervision toilet transfer, independent hygiene care, supervision sink side hand hygiene. Dizziness,nausea, and emesis in the sink.   RN in the room to administer medication for nausea.   Supervision sit to supine. Alarm on for safety.      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 Pt lives with her  and Bear, their dog. 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  Pt's  works from home.  Per  and chart, ongoing dizziness since 2020. Pt had syncope episode once last month. Per pt, she catches herself when she loses  balance.    PAIN ASSESSMENT  Ratin          OBJECTIVE  Precautions: Bed/chair alarm         ASSESSMENTS    AM-PAC ‘6-Clicks’ Inpatient Daily Activity Short Form  -   Putting on and taking off regular lower body clothing?: A Little  -   Bathing (including washing, rinsing, drying)?: A Little  -   Toileting, which includes using toilet, bedpan or urinal? : A Little  -   Putting on and taking off regular upper body clothing?: A Little  -   Taking care of personal grooming such as brushing teeth?: None  -   Eating meals?: None    AM-PAC Score:  Score: 20  Approx Degree of Impairment: 38.32%  Standardized Score (AM-PAC Scale): 42.03    ADDITIONAL TESTS     NEUROLOGICAL FINDINGS  Coordination - Finger to Nose: Symmetrical  Coordination - Rapid Alternating Movement: Symmetrical  Coordination - Finger Opposition: Symmetrical     COGNITION ASSESSMENTS       PLAN  OT Treatment Plan: Balance activities;Energy conservation/work simplification techniques;ADL training;Functional transfer training;Patient/Family education;Patient/Family training;Compensatory technique education;Visual perceptual training  Rehab Potential : Good  Frequency: 3-5x/week  Number of Visits to Meet Established Goals: 5    ADL Goals   Patient will perform lower body dressing:  with supervision    Functional Transfer Goals  Patient will transfer to toilet:  with supervision    Additional Goals  Pt will perform at least 3 visual perception exercises in order to improve visual perception skills that are needed for all ADL.      Therapist Goals  Clock Drawing      Patient Evaluation Complexity Level:   Occupational Profile/Medical History LOW - Brief history including review of medical or therapy records    Specific performance deficits impacting engagement in ADL/IADL LOW  1 - 3 performance deficits    Client Assessment/Performance Deficits MODERATE - Comorbidities and min to mod modifications of tasks    Clinical Decision Making LOW - Analysis of  occupational profile, problem-focused assessments, limited treatment options    Overall Complexity LOW     OT Session Time: 22 minutes  Self-Care Home Management: 8 minutes  Therapeutic Activity:  6minutes  Neuromuscular Re-education: 3 minutes

## 2024-04-16 NOTE — CM/SW NOTE
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 Will need follow up with HH list.    Ange Maldonado, MSN RN, CM  [phone]

## 2024-04-16 NOTE — PROGRESS NOTES
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    Vitals:   Temp:  [97.8 °F (36.6 °C)-98.7 °F (37.1 °C)] 98.3 °F (36.8 °C)  Pulse:  [] 89  Resp:  [8-21] 13  BP: ()/(53-97) 106/79  SpO2:  [93 %-100 %] 97 %  AO: ()/() 88/77  Body mass index is 23.15 kg/m².    Intake/Output:    Intake/Output Summary (Last 24 hours) at 2024 0908  Last data filed at 2024 0646  Gross per 24 hour   Intake 3105 ml   Output 2925 ml   Net 180 ml     Current Meds:  Current Facility-Administered Medications   Medication Dose Route Frequency    sodium chloride 0.9% infusion   Intravenous Continuous    acetaminophen (Ofirmev) 10 mg/mL infusion premix 1,000 mg  1,000 mg Intravenous Q6H    oxyCODONE immediate release tab 5 mg  5 mg Oral Q4H PRN    Or    oxyCODONE immediate release tab 10 mg  10 mg Oral Q4H PRN    HYDROmorphone (Dilaudid) 1 MG/ML injection 0.4 mg  0.4 mg Intravenous Q2H PRN    Or    HYDROmorphone (Dilaudid) 1 MG/ML injection 0.8 mg  0.8 mg Intravenous Q2H PRN    labetalol (Trandate) 5 mg/mL injection 10 mg  10 mg Intravenous Q10 Min PRN    hydrALAzine (Apresoline) 20 mg/mL injection 10 mg  10 mg Intravenous Q1H PRN    polyethylene glycol (PEG 3350) (Miralax) 17 g oral packet 17 g  17 g Oral Daily PRN    sennosides (Senokot) tab 17.2 mg  17.2 mg Oral Nightly PRN    bisacodyl (Dulcolax) 10 MG rectal suppository 10 mg  10 mg Rectal Daily PRN    fleet enema (Fleet) 7-19 GM/118ML rectal enema 133 mL  1 enema Rectal Once PRN    ondansetron (Zofran) 4 MG/2ML  injection 4 mg  4 mg Intravenous Q6H PRN    famotidine (Pepcid) tab 20 mg  20 mg Oral BID    Or    famotidine (Pepcid) 20 mg/2mL injection 20 mg  20 mg Intravenous BID    dexamethasone (Decadron) 4 MG/ML injection 4 mg  4 mg Intravenous Q6H    buPROPion SR (WELLBUTRIN SR) 12 hr tab 150 mg  150 mg Oral Daily    nicotine (Nicoderm CQ) 14 MG/24HR patch 1 patch  1 patch Transdermal Daily    ALPRAZolam (Xanax) tab 0.25 mg  0.25 mg Oral TID PRN    LORazepam (Ativan) 2 mg/mL injection 0.5 mg  0.5 mg Intravenous Q6H PRN    Or    LORazepam (Ativan) 2 mg/mL injection 1 mg  1 mg Intravenous Q6H PRN     Physical Examination:   General- No acute distress  CV- RRR  Resp- CTA Bilat  Neuro-  Mental status- awake and alert, regards and follows commands, oriented x3, speech fluent  Cranial nerves- pupils equally round and reactive to light, extraocular muscles intact, +L facial weakness  Motor- ames x4  Sens-  Intact to light touch       Diagnostics:   No results found.  Lab Review     Recent Labs   Lab 04/16/24  0417      K 3.9      CO2 25.0   *   BUN 12   CREATSERUM 0.68     Recent Labs   Lab 04/16/24 0417   WBC 12.3*   HGB 11.6*   .0     Assesment/Plan:     Neuro:  L cerebello-pontine angle mass -c/f vestibular schwannoma  Now s/p elective L retrosigmoid crani for tumor resection.   Postop, steroids, and mri per Neurosurg.   Cont neurochecks/pt/ot/st.  No further neurocritical care needs at this time, further management and outpt follow-up per Neurosurg, will follow peripherally please call with any questions.   Cardiac:  sbp goal 100-150  Pulmonary:  HERNAN- stable on RA  Renal:  IVFs  GI:  PO as tolerated  Heme/ID:  Afebrile, trend leukocytosis  Endocrine/Rheum:  Monitor glucose, sliding scale insulin prn  DVT Prophylaxis:  SCD’s    Goals of the Day: neurochecks, mri   A total of 40 minutes of critical care time (exclusive of billable procedures) was administered to manage and/or prevent neurologic  instability. This involved direct patient intervention, complex decision making, and/or extensive discussions with the patient, family, and clinical staff.    Thank you for allowing me to participate in the care of this patient.     Kin Olivares MD, [facility]  Medical Director of System Neurosciences  Chief, Section of Neurocritical Care  [facility]

## 2024-04-16 NOTE — PLAN OF CARE
Patient VSS.  Neuro assessments intact with no acute changes.  Leory and A-line removed.  Patient has no continuing issues with nausea and vomiting.  Patient in better control.  Anxiety better.  Patient and significant other updated with plan of care and will continue to monitor progress closely.

## 2024-04-16 NOTE — PHYSICAL THERAPY NOTE
PHYSICAL THERAPY EVALUATION - INPATIENT     Room Number: 6601/6601-A  Evaluation Date: 4/16/2024  Type of Evaluation: Initial  Physician Order: PT Eval and Treat    Presenting Problem: S/p Left image guided retrosigmoid craniectomy for resection of tumor, neuromonitoring, use of microscope 4/15  Co-Morbidities : vertigo, ADD, neuroma  Reason for Therapy: Mobility Dysfunction and Discharge Planning    PHYSICAL THERAPY ASSESSMENT   Patient is currently functioning below baseline with gait and stair negotiation.  Prior to admission, patient's baseline is indep althrough reported constant dizziness and vision deficits prior to surgery which are continued.  Patient is requiring contact guard assist and minimal assist as a result of the following impairments: impaired dynamic balance and dizziness .  Episode of emesis after ambulation - RN notified and present at end of session. Physical Therapy will continue to follow for duration of hospitalization.    Patient will benefit from continued skilled PT Services at discharge to promote functional independence in home.  Anticipate patient will return home with OP PT.    PLAN  PT Treatment Plan: Bed mobility;Body mechanics;Coordination;Endurance;Energy conservation;Patient education;Family education;Gait training;Neuromuscular re-educate;Range of motion;Strengthening;Stoop training;Stair training;Transfer training;Balance training  Rehab Potential : Good  Frequency (Obs): 3x/week  Number of Visits to Meet Established Goals: 5      CURRENT GOALS    Goal #1 Patient is able to demonstrate supine - sit EOB @ level: supervision     Goal #2 Patient is able to demonstrate transfers EOB to/from Chair/Wheelchair at assistance level: supervision     Goal #3 Patient is able to ambulate 150 feet with assist device:  LRAD  at assistance level: supervision     Goal #4 Patient will navigate stairs with rail and SBA   Goal #5    Goal #6    Goal Comments: Goals established on  2024      PHYSICAL THERAPY MEDICAL/SOCIAL HISTORY  History related to current admission: Patient is a 58 year old female admitted on 4/15/2024 from home for planned procedure - S/p Left image guided retrosigmoid craniectomy for resection of tumor, neuromonitoring, use of microscope 4/15 for Vestibular schwannoma      HOME SITUATION  Type of Home: House   Home Layout: Two level;Able to live on main level                Lives With: Spouse (bear the aliyah)  Drives: No  Patient Owned Equipment: None  Patient Regularly Uses: Glasses    Prior Level of [place]: 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), fainted x1 month ago, intermittent tripping, constant dizziness and vision deficits noted for the past 4 years- has been working with OPPT at [facility], spouse works from home and able to assist PRN, Bear the aliyah will be staying with a friend for a bit    SUBJECTIVE  \" Its better if I close by R eye\"       OBJECTIVE  Precautions: Bed/chair alarm  Fall Risk: Standard fall risk    WEIGHT BEARING RESTRICTION  Weight Bearing Restriction: None                PAIN ASSESSMENT  Ratin          COGNITION  Overall Cognitive Status:  WFL - within functional limits    RANGE OF MOTION AND STRENGTH ASSESSMENT  Upper extremity ROM and strength are within functional limits     Lower extremity ROM is within functional limits     Lower extremity strength is within functional limits       BALANCE  Static Sitting: Good  Dynamic Sitting: Good  Static Standing: Fair -  Dynamic Standing: Fair -    ADDITIONAL TESTS                                    ACTIVITY TOLERANCE           BP: 107/82  BP Location: Right arm  BP Method: Automatic  Patient Position: Sitting    O2 WALK       NEUROLOGICAL FINDINGS                        AM-PAC '6-Clicks' INPATIENT SHORT FORM - BASIC MOBILITY  How much difficulty does the patient currently have...  Patient Difficulty: Turning over in bed (including adjusting bedclothes, sheets and blankets)?: None    Patient Difficulty: Sitting down on and standing up from a chair with arms (e.g., wheelchair, bedside commode, etc.): None   Patient Difficulty: Moving from lying on back to sitting on the side of the bed?: None   How much help from another person does the patient currently need...   Help from Another: Moving to and from a bed to a chair (including a wheelchair)?: None   Help from Another: Need to walk in hospital room?: A Little   Help from Another: Climbing 3-5 steps with a railing?: A Little       AM-PAC Score:  Raw Score: 22   Approx Degree of Impairment: 20.91%   Standardized Score (AM-PAC Scale): 53.28   CMS Modifier (G-Code): CJ    FUNCTIONAL ABILITY STATUS  Gait Assessment   Functional Mobility/Gait Assessment  Gait Assistance: Contact guard assist  Distance (ft): 150  Assistive Device: Rolling walker  Pattern: Within Functional Limits    Skilled Therapy Provided     Bed Mobility:   Sit to supine: SBA     Transfer Mobility:  Sit to stand: SBA   Stand to sit: SBA  Gait = CGA    Therapist's Comments: R nystagmus noted with R horizontal gaze with pt reporting some vision deficits inconsistently (improved when she closed her R eye), educated on gaze stabilization and focal point while gait trained with RW, educated on  mechanics for turning with RW and with recommendation for segmented turn. Recommend continued use of RW at this time.     Exercise/Education Provided:  Gait training  Neuromuscular re-educate    Patient End of Session: In bed;With  staff;Needs met;Call light within reach;RN aware of session/findings;All patient questions and concerns addressed;Alarm set;Family present      Patient Evaluation Complexity Level:  History Moderate - 1 or 2 personal factors and/or co-morbidities   Examination of body systems Moderate - addressing a total of 3 or more elements   Clinical Presentation Moderate - Evolving   Clinical Decision Making Moderate - Evolving       PT Session Time: 24 minutes  Gait Training:  10 minutes  Therapeutic Activity:  minutes  Neuromuscular Re-education:  minutes  Therapeutic Exercise:  minutes

## 2024-04-17 ENCOUNTER — APPOINTMENT (OUTPATIENT)
Dept: MRI IMAGING | Facility: HOSPITAL | Age: 59
End: 2024-04-17
Attending: NURSE PRACTITIONER
Payer: MEDICAID

## 2024-04-17 LAB
ANION GAP SERPL CALC-SCNC: 3 MMOL/L (ref 0–18)
BLOOD TYPE BARCODE: 6200
BUN BLD-MCNC: 14 MG/DL (ref 9–23)
CALCIUM BLD-MCNC: 8.3 MG/DL (ref 8.5–10.1)
CHLORIDE SERPL-SCNC: 109 MMOL/L (ref 98–112)
CO2 SERPL-SCNC: 28 MMOL/L (ref 21–32)
CREAT BLD-MCNC: 0.64 MG/DL
EGFRCR SERPLBLD CKD-EPI 2021: 103 ML/MIN/1.73M2 (ref 60–?)
ERYTHROCYTE [DISTWIDTH] IN BLOOD BY AUTOMATED COUNT: 13 %
GLUCOSE BLD-MCNC: 114 MG/DL (ref 70–99)
GLUCOSE BLD-MCNC: 124 MG/DL (ref 70–99)
GLUCOSE BLD-MCNC: 124 MG/DL (ref 70–99)
GLUCOSE BLD-MCNC: 126 MG/DL (ref 70–99)
GLUCOSE BLD-MCNC: 148 MG/DL (ref 70–99)
HCT VFR BLD AUTO: 31.7 %
HGB BLD-MCNC: 10.8 G/DL
MCH RBC QN AUTO: 29.9 PG (ref 26–34)
MCHC RBC AUTO-ENTMCNC: 34.1 G/DL (ref 31–37)
MCV RBC AUTO: 87.8 FL
OSMOLALITY SERPL CALC.SUM OF ELEC: 292 MOSM/KG (ref 275–295)
PLATELET # BLD AUTO: 218 10(3)UL (ref 150–450)
POTASSIUM SERPL-SCNC: 4 MMOL/L (ref 3.5–5.1)
RBC # BLD AUTO: 3.61 X10(6)UL
SODIUM SERPL-SCNC: 140 MMOL/L (ref 136–145)
UNIT VOLUME: 350 ML
WBC # BLD AUTO: 10.7 X10(3) UL (ref 4–11)

## 2024-04-17 PROCEDURE — 70553 MRI BRAIN STEM W/O & W/DYE: CPT | Performed by: NURSE PRACTITIONER

## 2024-04-17 PROCEDURE — 99232 SBSQ HOSP IP/OBS MODERATE 35: CPT | Performed by: INTERNAL MEDICINE

## 2024-04-17 RX ORDER — ACETAMINOPHEN 500 MG
TABLET ORAL EVERY 6 HOURS PRN
Status: DISCONTINUED | OUTPATIENT
Start: 2024-04-17 | End: 2024-04-18

## 2024-04-17 RX ORDER — DEXAMETHASONE 4 MG/1
4 TABLET ORAL EVERY 8 HOURS SCHEDULED
Status: DISCONTINUED | OUTPATIENT
Start: 2024-04-17 | End: 2024-04-18

## 2024-04-17 RX ORDER — MINERAL OIL AND PETROLATUM 150; 830 MG/G; MG/G
OINTMENT OPHTHALMIC 3 TIMES DAILY
Status: DISCONTINUED | OUTPATIENT
Start: 2024-04-17 | End: 2024-04-18

## 2024-04-17 RX ORDER — GADOTERATE MEGLUMINE 376.9 MG/ML
15 INJECTION INTRAVENOUS
Status: COMPLETED | OUTPATIENT
Start: 2024-04-17 | End: 2024-04-17

## 2024-04-17 RX ORDER — METOCLOPRAMIDE HYDROCHLORIDE 5 MG/ML
10 INJECTION INTRAMUSCULAR; INTRAVENOUS EVERY 6 HOURS PRN
Status: DISCONTINUED | OUTPATIENT
Start: 2024-04-17 | End: 2024-04-18

## 2024-04-17 RX ORDER — METOCLOPRAMIDE 10 MG/1
10 TABLET ORAL EVERY 6 HOURS PRN
Status: DISCONTINUED | OUTPATIENT
Start: 2024-04-17 | End: 2024-04-18

## 2024-04-17 NOTE — PROGRESS NOTES
[facility]   part of [facility]     Hospitalist Progress Note     Irma Garcia Patient Status:  Inpatient    1965 MRN [id number]   Location [facility] 6NE-A Attending Randall Liu MD   Hosp Day # 2 PCP Waylon Tong MD     Reason for consult: Medical management     Requested by: Randall Campbell    Subjective:     Patient states she feels fine. Did ambulate in the halls yesterday. Still with left facial droop and left facial weakness.     Objective:    Review of Systems:   A comprehensive review of systems was completed; pertinent positive and negatives stated in subjective.    Vital signs:  Temp:  [97.8 °F (36.6 °C)-98.8 °F (37.1 °C)] 98 °F (36.7 °C)  Pulse:  [65-90] 78  Resp:  [10-18] 14  BP: ()/(61-83) 98/75  SpO2:  [94 %-100 %] 100 %    Physical Exam:    General: No acute distress, awake and alert  Respiratory: No wheezes, no rhonchi  Cardiovascular: S1, S2, regular rate and rhythm  Abdomen: Soft, Non-tender, non-distended, positive bowel sounds  Neuro: No new focal deficits. +L facial droop and unable to close left eye; L sided surgical incision c/d/I with staples   Extremities: No edema    Diagnostic Data:    Labs:  Recent Labs   Lab 24  0417 24  0338   WBC 12.3* 10.7   HGB 11.6* 10.8*   MCV 87.5 87.8   .0 218.0     Recent Labs   Lab 24  0417 24  0338   * 124*   BUN 12 14   CREATSERUM 0.68 0.64   CA 8.3* 8.3*    140   K 3.9 4.0    109   CO2 25.0 28.0     Estimated Creatinine Clearance: 76.6 mL/min (based on SCr of 0.64 mg/dL).    No results for input(s): \"TROP\", \"TROPHS\", \"CK\" in the last 168 hours.    No results for input(s): \"PTP\", \"INR\" in the last 168 hours.     Microbiology  No results found for this visit on 04/15/24.    Imaging: Reviewed in Epic.    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  -Primary management per neurosurgery  -MRI brain    #Leukocytosis, suspect reactive  -Resolved    #Anxiety  -Ativan PRN, bupropion     #Obstructive Sleep Apnea   -CPAP at night per protocol      #Hyperlipidemia   -Diet controlled      #Tobacco use   -Continue wellbutrin, nicotine patch     #RLS      Sam Garcia,       Supplementary Documentation:     Quality:  DVT Mechanical Prophylaxis: ARELIS hose, SCDs,    DVT Pharmacologic Prophylaxis   Medication   None     Code Status: Full Code  Leroy: No urinary catheter in place    The 21st Century Cures Act makes medical notes like these available to patients in the interest of transparency. Please be advised this is a medical document. Medical documents are intended to carry relevant information, facts as evident, and the clinical opinion of the practitioner. The medical note is intended as peer to peer communication and may appear blunt or direct. It is written in medical language and may contain abbreviations or verbiage that are unfamiliar.

## 2024-04-17 NOTE — PLAN OF CARE
Assumed care at 0730  A&Ox4, VSS, up with standby assist   Oxy given for pain   No change in neuro status   L back of head incision- GLENN  MRI complete  Patient and family updated on POC   All questions answered   Call light within reach

## 2024-04-17 NOTE — PROGRESS NOTES
[facility]  Neurosurgery Progress Note    Irma Garcia Patient Status:  Inpatient    1965 MRN [id number]   Location [facility] 7NE-A Attending Randall Liu MD   Hosp Day # 2 PCP Waylon Tong MD     Chief Complaint:  No chief complaint on file.      Subjective:  Pt examined, reports anxiety yesterday but she feels better today.  No other complaints    Objective/Physical Exam:    Vital Signs:  Blood pressure 124/89, pulse 76, temperature 98.3 °F (36.8 °C), temperature source Oral, resp. rate 15, height 58\", weight 110 lb 4.8 oz (50 kg), SpO2 99%, not currently breastfeeding.    Respiratory:  Respirations nonlabored    CV:  NSR on tele    General: NAD, Speech Fluent, Mood Appropriate    Neurologic: This patient is alert and orientated x 3.  Able to follow commands.  L facial droop.  Unable to fully close L eye.  Remainder of CN 2-12 GI,  Sensation to light touch is intact bilaterally.  BROWN x 4.  Gait steady with walker      Skin: Surgical incision approximated with glue      Labs:  Lab Results   Component Value Date    WBC 10.7 2024    HGB 10.8 2024    HCT 31.7 2024    .0 2024    CREATSERUM 0.64 2024    BUN 14 2024     2024    K 4.0 2024     2024    CO2 28.0 2024     2024    CA 8.3 2024    PGLU 126 2024       Imaging:  Mri brain pending    Assessment:  S/p L retrosigmoid craniectomy for tumor resection POD #2  L Facial nerve palsy    Plan:  Pt seen and examined with Dr. Noe Joshua 4 q 8  Pain medications as ordered  Medical management per hospitalist  DVT prophylaxis SCDs TEDs  Possible dc home tomorrow      MATILDE Beltran  [facility] [place]  2024, 10:14 AM

## 2024-04-17 NOTE — CM/SW NOTE
CM spoke to patient for discharge planning follow up and provided home health agency quality information for review (one accepting HH agency at this time).  Patient stated she is agreeable to services with [facility] and stated she will be staying at her boyfriend's house upon discharge (Address: [address] [place] [address] , [place] [address]).  CM reserved HH agency in aidin system and provided address where patient will be staying.  Agency contact information included in discharge summary.     India Roach RN Case Manager [phone]

## 2024-04-17 NOTE — DISCHARGE INSTRUCTIONS
Sometimes managing your health at home requires assistance.  The [facility] team has recognized your preference to use [facility], Phone: [phone].  A representative from the home health agency will contact you or your family to schedule your first visit.

## 2024-04-17 NOTE — SLP NOTE
SPEECH DAILY NOTE - INPATIENT    ASSESSMENT & PLAN   ASSESSMENT  Pt seen for dysphagia tx to assess tolerance with recommended diet, ensure proper utilization of aspiration precautions and provide pt/family education.  Patient alert and up in bed with  present and supportive. Left facial droop persists but she reports pre surgery left facial numbness is improved. Her speech is 100% intelligible but it is mildly dysarthric. She reports reduced appetite but denied any coughing or choking with po. Due to previous numbness, she reports she tends to chew on the right side of her mouth. She was able to self present thin liquids by straw and demonstrated no overt signs of aspiration throughout. Discussed monitoring left facial weakness for improvement and considering following up with OP therapy after allowing post surgical recovery in anticipation of spontaneous improvement. Patient and  in agreement.     Diet Recommendations - Solids: Regular  Diet Recommendations - Liquids: Thin Liquids    Compensatory Strategies Recommended: Small bites and sips (place food and liquid on the right side of mouth)  Aspiration Precautions: Upright position;Slow rate (placement on the right side)  Medication Administration Recommendations: One pill at a time    Patient Experiencing Pain: No                Treatment Plan  Treatment Plan/Recommendations: No further inpatient SLP service warranted    Interdisciplinary Communication: Discussed with RN            GOALS  Goal #1 The patient will tolerate regular consistency and thin liquids without overt signs or symptoms of aspiration with 95 % accuracy over 1-2 session(s).  Met   Goal #2 The patient/family/caregiver will demonstrate understanding and implementation of aspiration precautions and swallow strategies independently over 1-2 session(s).     Met       FOLLOW UP  Follow Up Needed (Documentation Required): No  SLP Follow-up Date: 04/16/24  Number of Visits to Meet  Established Goals: 2    Session: 1    If you have any questions, please contact Bran Calvillo MS CCC-SLP  Pager [phone]

## 2024-04-17 NOTE — PLAN OF CARE
Assumed care of Irma at 1930.alert/oriented x 4. She is neuro logically stable w/  L droop, L  facial weakness with unable to close L eye, decrease L face sensation are all noted. With mild headache & dizziness when assisted up to the bathroom. EOMS intact but w/ nystagmus noted. Following commands, no focal weakness. Pls see neuro flow sheet for full assessment. NSR. Decadron?iV given a scheduled. Plan & schedule of care discussed. Will continue to monitor.  0630- No acute change in neuro status over night. To transfer to [facility] with hand off report given to SOUMYA Hand.

## 2024-04-18 VITALS
HEIGHT: 58 IN | DIASTOLIC BLOOD PRESSURE: 90 MMHG | SYSTOLIC BLOOD PRESSURE: 133 MMHG | HEART RATE: 83 BPM | WEIGHT: 110.31 LBS | TEMPERATURE: 98 F | RESPIRATION RATE: 13 BRPM | OXYGEN SATURATION: 98 % | BODY MASS INDEX: 23.16 KG/M2

## 2024-04-18 LAB
GLUCOSE BLD-MCNC: 104 MG/DL (ref 70–99)
GLUCOSE BLD-MCNC: 111 MG/DL (ref 70–99)

## 2024-04-18 PROCEDURE — 99232 SBSQ HOSP IP/OBS MODERATE 35: CPT | Performed by: INTERNAL MEDICINE

## 2024-04-18 RX ORDER — ONDANSETRON 4 MG/1
4 TABLET, ORALLY DISINTEGRATING ORAL EVERY 8 HOURS PRN
Qty: 20 TABLET | Refills: 0 | Status: SHIPPED | OUTPATIENT
Start: 2024-04-18

## 2024-04-18 RX ORDER — NALOXONE HYDROCHLORIDE 4 MG/.1ML
4 SPRAY NASAL AS NEEDED
Qty: 1 KIT | Refills: 0 | Status: SHIPPED | OUTPATIENT
Start: 2024-04-18

## 2024-04-18 RX ORDER — OXYCODONE HYDROCHLORIDE 5 MG/1
TABLET ORAL EVERY 4 HOURS PRN
Qty: 60 TABLET | Refills: 0 | Status: SHIPPED | OUTPATIENT
Start: 2024-04-18

## 2024-04-18 RX ORDER — DEXAMETHASONE 4 MG/1
TABLET ORAL
Qty: 13 TABLET | Refills: 0 | Status: SHIPPED | OUTPATIENT
Start: 2024-04-18 | End: 2024-04-26

## 2024-04-18 NOTE — PROGRESS NOTES
[facility]   part of [facility]     Hospitalist Progress Note     Irma Garcia Patient Status:  Inpatient    1965 MRN [id number]   Location [facility] 6NE-A Attending Randall Liu MD   Hosp Day # 3 PCP Waylon Tong MD     Reason for consult: Medical management     Requested by: Randall Campbell    Subjective:     Patient not eating much. Reports intermittent nausea, better with zofran. Pain is minimal and controlled on PRN meds.    Objective:    Review of Systems:   A comprehensive review of systems was completed; pertinent positive and negatives stated in subjective.    Vital signs:  Temp:  [97.6 °F (36.4 °C)-98.6 °F (37 °C)] 97.6 °F (36.4 °C)  Pulse:  [69-82] 69  Resp:  [12-18] 18  BP: ()/(70-94) 123/80  SpO2:  [95 %-99 %] 95 %    Physical Exam:    General: No acute distress, awake and alert  Respiratory: No wheezes, no rhonchi  Cardiovascular: S1, S2, regular rate and rhythm  Abdomen: Soft, Non-tender, non-distended, positive bowel sounds  Neuro: No new focal deficits. +L facial droop and unable to close left eye; L sided surgical incision c/d/I with staples   Extremities: No edema    Diagnostic Data:    Labs:  Recent Labs   Lab 24  0417 24  0338   WBC 12.3* 10.7   HGB 11.6* 10.8*   MCV 87.5 87.8   .0 218.0     Recent Labs   Lab 24  0417 24  0338   * 124*   BUN 12 14   CREATSERUM 0.68 0.64   CA 8.3* 8.3*    140   K 3.9 4.0    109   CO2 25.0 28.0     Estimated Creatinine Clearance: 76.6 mL/min (based on SCr of 0.64 mg/dL).    No results for input(s): \"TROP\", \"TROPHS\", \"CK\" in the last 168 hours.    No results for input(s): \"PTP\", \"INR\" in the last 168 hours.     Microbiology  No results found for this visit on 04/15/24.    Imaging: Reviewed in Epic.    Medications:    dexamethasone  4 mg Oral Q8H MAAME    mineral oil-white petrolatum   Left Eye TID    famotidine  20 mg Oral BID    Or    famotidine  20 mg Intravenous BID     buPROPion SR  150 mg Oral Daily    nicotine  1 patch Transdermal Daily       Assessment & Plan:      #Left Vestibular schwannoma   -S/p elective L retrosigmoid crani/resection on 4/15  -NS primary  -Pain control  -Decadron  -PT/OT  -MRI brain postop completed    #Facial nerve palsy after vestibular schwannoma resection  -Primary management per neurosurgery    #Leukocytosis, suspect reactive  -Resolved    #Anxiety  -Ativan PRN, bupropion     #Obstructive Sleep Apnea   -CPAP at night per protocol      #Hyperlipidemia   -Diet controlled      #Tobacco use   -Continue wellbutrin, nicotine patch     #RLS    No objections to discharge.    Sam Garcia,       Supplementary Documentation:     Quality:  DVT Mechanical Prophylaxis: ARELIS hose, SCDs,    DVT Pharmacologic Prophylaxis   Medication   None     Code Status: Full Code  Leroy: No urinary catheter in place    The 21st Century Cures Act makes medical notes like these available to patients in the interest of transparency. Please be advised this is a medical document. Medical documents are intended to carry relevant information, facts as evident, and the clinical opinion of the practitioner. The medical note is intended as peer to peer communication and may appear blunt or direct. It is written in medical language and may contain abbreviations or verbiage that are unfamiliar.

## 2024-04-18 NOTE — PLAN OF CARE
Assumed care at 1930  Alert and oriented x4   NSR on tele and on RA  Q4 neuros, no new deficits, see flowsheets  Pain, oxy given  Prn xanax given  Nausea, prn reglan given   L side of head intact, see flowsheets  Call light w/in reach

## 2024-04-18 NOTE — DISCHARGE SUMMARY
[facility]  Discharge Summary    Irma Garcia Patient Status:  Inpatient    1965 MRN [id number]   Location [facility] 7NE-A Attending No att. providers found   Hosp Day # 3 PCP Waylon Tong MD     Date of Admission: 4/15/2024    Date of Discharge: 2024  2:09 PM    Admitting Diagnosis: Vestibular schwannoma (HCC) [D33.3]  Vestibular schwannoma (HCC)    Discharge Diagnosis:   Patient Active Problem List   Diagnosis    Attention deficit hyperactivity disorder (ADHD)    Mood disorder in conditions classified elsewhere    ADD (attention deficit disorder)    History of adenomatous polyp of colon    Benign colon polyp    Vertigo    Vestibular neuritis, unspecified laterality    Depressed mood    Depression with anxiety    Lipoma of back    Neuroma    Hammer toe of right foot    Bilateral tinnitus    Left carpal tunnel syndrome    Pure hypercholesterolemia    Tobacco dependence    HERNAN (obstructive sleep apnea)    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    Uterine prolapse    Vestibular schwannoma (HCC)    Facial droop    Anxiety    Tobacco use    Encounter for preprocedure screening laboratory testing for COVID-19    Vestibular schwannoma (HCC) [D33.3]    Consultations:       Procedures    Consult to Neuro Intensivist    Consult to Hospitalist    Consult to Respiratory Care    Consult to Neuro Critical Care    Social work       Reason for Admission: Vestibular Schwannoma    Procedures: LEFT IMAGE GUIDED RETROSIGMOID CRANIECTOMY FOR TUMOR RESECTION, NEUROMONITORING     History of Present Illness: Irma Garcia is a very pleasant 58 year old female who presents today for consultation.  Pt states in  she developed vertigo.  She states she was diagnosted with vestibular neuritis.  She had completed PT with some improvement.  Pt has had some numbness of her left lip.  Pt states she attributed this to lip filler.  Pt states she also has had decreased hearing in her left ear.  She attributed this to  shingles.  Pt has had ocular migraines which was new for her.  Pt states she had increased numbness of the left side of her face.  She states she has a change in sensation in her mouth.  Pt saw her PCP for this who ordered a MRI.  Pt was referred to neurosurgery for her MRI.  She states she intermittent fasting.  She was a previous smoker but has quit.  Pt states she was told PT initially told her her migraines were due to nicotine withdrawal.      Brief Summary of Hospital Course: Pt presented for procedure as planned.  Intra op she had normal SSP and Eps for the facial nerve.  Post op she had facial nerve palsy on the left.  Pt had expected post op pain.  She did well and was recommended to discharge home.  Pt discharged once her post op MRI was completed and pain control    Complications: No significant complications    Discharge Condition: Stable    Disposition: Home or Self Care    Discharge Medications:   Discharge Medication List as of 4/18/2024 12:05 PM        START taking these medications    Details   dexamethasone (DECADRON) 4 MG tablet Take 1 tablet (4 mg total) by mouth every 8 (eight) hours for 2 days, THEN 1 tablet (4 mg total) 2 (two) times daily with meals for 2 days, THEN 1 tablet (4 mg total) daily with breakfast for 2 days, THEN 0.5 tablets (2 mg total) daily with breakfast for  2 days., Normal, Disp-13 tablet, R-0      oxyCODONE 5 MG Oral Tab Take 1-2 tablets (5-10 mg total) by mouth every 4 (four) hours as needed for Pain., Normal, Disp-60 tablet, R-0      Naloxone HCl 4 MG/0.1ML Nasal Liquid 4 mg by Nasal route as needed. If patient remains unresponsive, repeat dose in other nostril 2-5 minutes after first dose., Normal, Disp-1 kit, R-0      ondansetron 4 MG Oral Tablet Dispersible Take 1 tablet (4 mg total) by mouth every 8 (eight) hours as needed for Nausea., Normal, Disp-20 tablet, R-0           CONTINUE these medications which have NOT CHANGED    Details   buPROPion  MG Oral Tablet  12 Hr Take 1 tablet (150 mg total) by mouth daily., Normal, Disp-90 tablet, R-1             Your appointments       Date & Time Appointment Department ([place])    Apr 30, 2024 12:45 PM CDT Physical Therapy Neuro Treatment with Nely Joy, PT [facility] ([facility])    Your appointment is located at [address] [place], [place]. Please park in the [facility] parking lot and proceed to Rehab Services.        Apr 30, 2024 2:00 PM CDT Post Op Visit with Randall Liu MD [facility] ([facility])        May 07, 2024 12:45 PM CDT Physical Therapy Neuro Treatment with Nely Joy, PT [facility] ([facility])    Your appointment is located at [address], [place]. Please park in the [facility] parking lot and proceed to Rehab Services.        May 14, 2024 1:30 PM CDT Physical Therapy Neuro Treatment with Nely Joy, PT [facility] ([facility])    Your appointment is located at [address] [place], [place]. Please park in the [facility] parking lot and proceed to Rehab Services.        May 28, 2024 2:00 PM CDT Post Op Visit with Randall Liu MD [facility] ([facility]  [facility]  [address] [facility] [address]  [phone] [address] [place] [id number] [phone]             Patient Instructions:  Activity: OOB, ambulate several times a day.  Avoid prolonged immobility  Wound Care: Ok to shower, do not scrub incision.  Call with questions or concerns regarding wound    Other Discharge Instructions:  F/u as scheduled.  Call or go to ED should you develop  fever, shortness of breath, chest pain, or leg swelling    MATILDE Beltran  4/18/2024  3:02 PM

## 2024-04-18 NOTE — PLAN OF CARE
NURSING DISCHARGE NOTE    Assumed care at 0730  Alert and oriented x4  RA. Nsr on tele. VSS  Neuro checks done. No new changes. L eye blurriness/double vision  Denies pain and nausea  L side head incision intact.   Pt updated on POC  Safety precautions in place    Medically cleared for discharge. IV removed. Tele removed. Discharge instructions given to pt and  at bedside. All questions answered. Verbalized understanding. Scripts given by meds to beds.     Discharged Home via Wheelchair.  Accompanied by RN and Support staff  Belongings Taken by patient/family.

## 2024-04-18 NOTE — PROGRESS NOTES
[facility]  Neurosurgery Progress Note    Irma Garcia Patient Status:  Inpatient    1965 MRN [id number]   Location [facility] 7NE-A Attending Randall Liu MD   Hosp Day # 3 PCP Waylon Tong MD     PRINCIPLE PROBLEM:   Left vestibular schwannoma s/p L retrosigmoid craniectomy for tumor resection POD #3  Post op left facial nerve palsy    SUBJECTIVE     Pt examined, feels well, has no new complaints at present. States her left eye was drying out overnight so eye patch was applied, which is helping. Significant other at bedside.     OBJECTIVE/PHYSICAL EXAM     VITALS:  /80 (BP Location: Right arm)   Pulse 69   Temp 97.6 °F (36.4 °C) (Temporal)   Resp 18   Ht 58\"   Wt 110 lb 4.8 oz (50 kg)   LMP  (LMP Unknown)   SpO2 95%   BMI 23.15 kg/m²     GENERAL: No acute distress, non-toxic appearing, mood appropriate    RESP:  Respirations non-labored, even    CV:  NSR on tele    NEURO: This patient is alert and oriented x 3.  Able to follow commands.  L facial droop.  Unable to fully close L eye.  Remainder of CN 2-12 grossly intact. Sensation to light touch is intact bilaterally.  BROWN x 4.  Gait steady with walker     SKIN: Surgical incision approximated with glue, remains C/D/I without drainage, erythema, edema.    LABS     Lab Results   Component Value Date    PGLU 111 2024       IMAGING     MRI BRAIN (W+WO) (CPT=70553)    Result Date: 2024  CONCLUSION:  Surgical changes following craniotomy for resection of posterior fossa mass on the left.  Assessment is complicated by pre contrast increased signal on the T1 weighted images which was not present on prior consistent with some operative hemorrhage; however there is enhancement including rim enhancement, dural enhancement and enhancement extending into the left internal auditory canal and the presence of residual tumor is not excluded, and can be evaluated on follow-up MRI imaging.  No developing hydrocephalus, and slightly  less mass effect upon the left 4th ventricle.  Some increased signal is now present without enhancement involving the lateral left cerebellar hemisphere on T2/FLAIR imaging, most likely postsurgical edematous changes.   LOCATION:  [place]    Dictated by (CST): Ravin Diaz MD on 4/17/2024 at 12:49 PM     Finalized by (CST): Ravin Diaz MD on 4/17/2024 at 1:03 PM        ASSESSMENT & PLAN     ASSESSMENT:  S/p L retrosigmoid craniectomy for tumor resection POD #23  L Facial nerve palsy     PLAN:  Decadron 4 q 8  Steroid taper prescribed on discharge  Pain medications as ordered  Medical management per hospitalist  DVT prophylaxis SCDs TEDs  Okay to DC home from a Neurosurgical standpoint  Reviewed incisional care with patient. All questions answered at this time.     Plan discussed with Dr. Liu.     Graciela Luu, APRN-NP  [facility]  4/18/2024, 11:20 AM

## 2024-04-19 ENCOUNTER — PATIENT OUTREACH (OUTPATIENT)
Dept: CASE MANAGEMENT | Age: 59
End: 2024-04-19

## 2024-04-19 NOTE — PAYOR COMM NOTE
--------------  DISCHARGE REVIEW    Payor: [facility]  Subscriber #:  [id number]  Authorization Number: [id number]    Admit date: 4/15/24  Admit time:   5:15 AM  Discharge Date: 2024  2:09 PM     Admitting Physician: Randall Liu MD  Attending Physician:  No att. providers found  Primary Care Physician: Waylon Tong MD          Discharge Summary Notes        Discharge Summary signed by Ann Bragg APN at 2024  3:09 PM       Author: Ann Bragg APN Specialty: Nurse Practitioner Family, NEUROSURGERY Author Type: Nurse Practitioner    Filed: 2024  3:09 PM Date of Service: 2024  3:02 PM Status: Signed    : Ann Bragg APN (Nurse Practitioner)         [facility]  Discharge Summary    Irma Garcia Patient Status:  Inpatient    1965 MRN [id number]   Location [facility] 7NE-A Attending No att. providers found   Hosp Day # 3 PCP Waylon Tong MD     Date of Admission: 4/15/2024    Date of Discharge: 2024  2:09 PM    Admitting Diagnosis: Vestibular schwannoma (HCC) [D33.3]  Vestibular schwannoma (HCC)    Discharge Diagnosis:   Patient Active Problem List   Diagnosis    Attention deficit hyperactivity disorder (ADHD)    Mood disorder in conditions classified elsewhere    ADD (attention deficit disorder)    History of adenomatous polyp of colon    Benign colon polyp    Vertigo    Vestibular neuritis, unspecified laterality    Depressed mood    Depression with anxiety    Lipoma of back    Neuroma    Hammer toe of right foot    Bilateral tinnitus    Left carpal tunnel syndrome    Pure hypercholesterolemia    Tobacco dependence    HERNAN (obstructive sleep apnea)    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    Uterine prolapse    Vestibular schwannoma (HCC)    Facial droop    Anxiety    Tobacco use    Encounter for preprocedure screening laboratory testing for COVID-19    Vestibular schwannoma (HCC) [D33.3]    Consultations:        Procedures    Consult to Neuro Intensivist    Consult to Hospitalist    Consult to Respiratory Care    Consult to Neuro Critical Care    Social work       Reason for Admission: Vestibular Schwannoma    Procedures: LEFT IMAGE GUIDED RETROSIGMOID CRANIECTOMY FOR TUMOR RESECTION, NEUROMONITORING     History of Present Illness: Irma Garcia is a very pleasant 58 year old female who presents today for consultation.  Pt states in 2020 she developed vertigo.  She states she was diagnosted with vestibular neuritis.  She had completed PT with some improvement.  Pt has had some numbness of her left lip.  Pt states she attributed this to lip filler.  Pt states she also has had decreased hearing in her left ear.  She attributed this to shingles.  Pt has had ocular migraines which was new for her.  Pt states she had increased numbness of the left side of her face.  She states she has a change in sensation in her mouth.  Pt saw her PCP for this who ordered a MRI.  Pt was referred to neurosurgery for her MRI.  She states she intermittent fasting.  She was a previous smoker but has quit.  Pt states she was told PT initially told her her migraines were due to nicotine withdrawal.      Brief Summary of Hospital Course: Pt presented for procedure as planned.  Intra op she had normal SSP and Eps for the facial nerve.  Post op she had facial nerve palsy on the left.  Pt had expected post op pain.  She did well and was recommended to discharge home.  Pt discharged once her post op MRI was completed and pain control    Complications: No significant complications    Discharge Condition: Stable    Disposition: Home or Self Care    Discharge Medications:   Discharge Medication List as of 4/18/2024 12:05 PM        START taking these medications    Details   dexamethasone (DECADRON) 4 MG tablet Take 1 tablet (4 mg total) by mouth every 8 (eight) hours for 2 days, THEN 1 tablet (4 mg total) 2 (two) times daily with meals for 2 days, THEN 1  tablet (4 mg total) daily with breakfast for 2 days, THEN 0.5 tablets (2 mg total) daily with breakfast for  2 days., Normal, Disp-13 tablet, R-0      oxyCODONE 5 MG Oral Tab Take 1-2 tablets (5-10 mg total) by mouth every 4 (four) hours as needed for Pain., Normal, Disp-60 tablet, R-0      Naloxone HCl 4 MG/0.1ML Nasal Liquid 4 mg by Nasal route as needed. If patient remains unresponsive, repeat dose in other nostril 2-5 minutes after first dose., Normal, Disp-1 kit, R-0      ondansetron 4 MG Oral Tablet Dispersible Take 1 tablet (4 mg total) by mouth every 8 (eight) hours as needed for Nausea., Normal, Disp-20 tablet, R-0           CONTINUE these medications which have NOT CHANGED    Details   buPROPion  MG Oral Tablet 12 Hr Take 1 tablet (150 mg total) by mouth daily., Normal, Disp-90 tablet, R-1             Your appointments       Date & Time Appointment Department (Center)    Apr 30, 2024 12:45 PM CDT Physical Therapy Neuro Treatment with Nely Joy, PT [facility] ([facility])    Your appointment is located at [address], [place]. Please park in the Purple parking lot and proceed to Rehab Services.        Apr 30, 2024 2:00 PM CDT Post Op Visit with Randall Liu MD [facility] ([facility])        May 07, 2024 12:45 PM CDT Physical Therapy Neuro Treatment with Nely Joy, PT [facility] ([facility])    Your appointment is located at [address], [place]. Please park in the Purple parking lot and proceed to Rehab Services.        May 14, 2024 1:30 PM CDT Physical Therapy Neuro Treatment [facility] ([facility])    Your appointment is located at [address], [place]. Please park in the Purple parking lot and proceed to Rehab Services.        May  28, 2024 2:00 PM CDT Post Op Visit with Randall Liu MD [facility] ([facility]  [address] [address]  [phone] [address] [place] [id number] [phone]             Patient Instructions:  Activity: OOB, ambulate several times a day.  Avoid prolonged immobility  Wound Care: Ok to shower, do not scrub incision.  Call with questions or concerns regarding wound    Other Discharge Instructions:  F/u as scheduled.  Call or go to ED should you develop fever, shortness of breath, chest pain, or leg swelling    MATILDE Beltran  4/18/2024  3:02 PM      Electronically signed by Ann Bragg APN on 4/18/2024  3:09 PM         REVIEWER COMMENTS

## 2024-04-22 ENCOUNTER — TELEPHONE (OUTPATIENT)
Dept: SURGERY | Facility: CLINIC | Age: 59
End: 2024-04-22

## 2024-04-22 NOTE — TELEPHONE ENCOUNTER
Received pt reminder notice       Per pt reminder note:    \"Pt scheduled for surgery on 4.17.24 with Dr. Liu     Pt reminder placed to appear 3-5 days after sx \"      Routed to RN pool       LEFT IMAGE GUIDED RETROSIGMOID CRANIECTOMY FOR TUMOR RESECTION, NEUROMONITORING Left General   INTRAOPERATIVE NEURO MONITORING

## 2024-04-24 ENCOUNTER — TELEPHONE (OUTPATIENT)
Dept: SURGERY | Facility: CLINIC | Age: 59
End: 2024-04-24

## 2024-04-24 NOTE — TELEPHONE ENCOUNTER
Verified Name and   Patient would like a call back to discuss some concerns she has in regards to the Craniectomy for tumor resection she had on 4/15/24 . She has no facial movement on left

## 2024-04-25 ENCOUNTER — TELEPHONE (OUTPATIENT)
Dept: SURGERY | Facility: CLINIC | Age: 59
End: 2024-04-25

## 2024-04-25 ENCOUNTER — TELEPHONE (OUTPATIENT)
Dept: PHYSICAL THERAPY | Facility: HOSPITAL | Age: 59
End: 2024-04-25

## 2024-04-25 DIAGNOSIS — D33.3 VESTIBULAR SCHWANNOMA (HCC): Primary | ICD-10-CM

## 2024-04-25 DIAGNOSIS — G51.0 FACIAL NERVE PALSY: ICD-10-CM

## 2024-04-25 NOTE — TELEPHONE ENCOUNTER
Conducted Post operative outreach call with patient for brain surgery:    LEFT IMAGE GUIDED RETROSIGMOID CRANIECTOMY FOR TUMOR RESECTION, NEUROMONITORING on 4.15.24 with Dr. Liu      1) Asked how patient is feeling in general:  In general feeling well, a bit achy but nothing to significant    2) conducted verbal assessment:  Fevers NO  chills NO  Staple site check:  Redness NO  Swelling NO  Drainage NO  warmth around incision area NO    3) Discussed current Pain level with patient:  Pt states pain is currently 2-3/10, pressure type headache, using Tylenol and taking the steroid prescribed.     Discussed additional pain control methods with patient to include use of heat or cold compress to the face for facial congestion.      4) Discussed pain medication with patient and ascertained if refills were needed at this time, pt stated:  No refills needed at this time, she is only using Tylenol and Steroid, today is her last day of steroid.  After speaking with Ann, advised ok to use ibuprofen for headache.     5) Discussed [place] restrictions:  Showering okay, do not scrub incisions (ok to use baby shampoo), no pools or hot tubs, no submerging incision.    Post op restrictions:  Avoid activities that increase pressure in the head such as :    Bending over, with head low        Straining / pushing during a bowel movement    Prolonged coughing (use a cough suppressant)   Do not drive after surgery until discussed with your surgeon and avoid sitting for long periods.   Do not lift anything heavier than 5  pounds (e.g., gallon of milk), including children. Housework and yard-work are not permitted until the first follow-up office visit. Avoid gardening, mowing, vacuuming and loading / unloading the , washer, or dryer.   Do not drink alcoholic beverages while on pain medicine.      Patient acknowledged    6) Confirmed post op appointments with patient:    2 week:4.30.24  6/8 week: 5.7.24    7) Additional  issues:  Patient is concerned that she still has complete paralysis and hearing loss of left side (surgical side). This is not new onset, she woke from surgery this way. She states she can not close left eye and has been having to tape it closed. She has been having trouble swallowing, eating, and drinking. She notes distress at her appearance due to facial drooping and would like to know if she will improve.       Discussed with provider who stated 95 % of patients with post operative facial palsy improve gradually after surgery.   Also informed pt that her therapist has reached out to us to advise that she does do special therapy for facial nerves. Ann has placed a referral to Nely physical therapist for facial nerve therapy.     Pt acknowledged and will call to make these appointments.      Condition update given to provider

## 2024-04-25 NOTE — TELEPHONE ENCOUNTER
PT order placed to resume PT as she was doing prior to surgery.  We will re-evaluate her on 4/30 in clinic however it is ok to start NSAIDS for headache

## 2024-04-30 ENCOUNTER — OFFICE VISIT (OUTPATIENT)
Dept: NEUROLOGY | Facility: CLINIC | Age: 59
End: 2024-04-30
Payer: MEDICAID

## 2024-04-30 ENCOUNTER — OFFICE VISIT (OUTPATIENT)
Dept: PHYSICAL THERAPY | Facility: HOSPITAL | Age: 59
End: 2024-04-30
Attending: INTERNAL MEDICINE
Payer: MEDICAID

## 2024-04-30 ENCOUNTER — NURSE ONLY (OUTPATIENT)
Dept: HEMATOLOGY/ONCOLOGY | Facility: HOSPITAL | Age: 59
End: 2024-04-30
Attending: NEUROLOGICAL SURGERY
Payer: MEDICAID

## 2024-04-30 VITALS
SYSTOLIC BLOOD PRESSURE: 103 MMHG | DIASTOLIC BLOOD PRESSURE: 69 MMHG | TEMPERATURE: 99 F | WEIGHT: 105 LBS | HEART RATE: 84 BPM | OXYGEN SATURATION: 84 % | HEIGHT: 57.99 IN | RESPIRATION RATE: 16 BRPM | BODY MASS INDEX: 22.04 KG/M2

## 2024-04-30 DIAGNOSIS — G51.0 FACIAL NERVE PALSY: ICD-10-CM

## 2024-04-30 DIAGNOSIS — Z98.890 S/P CRANIOTOMY: ICD-10-CM

## 2024-04-30 DIAGNOSIS — D33.3 VESTIBULAR SCHWANNOMA (HCC): Primary | ICD-10-CM

## 2024-04-30 PROCEDURE — 99024 POSTOP FOLLOW-UP VISIT: CPT | Performed by: NEUROLOGICAL SURGERY

## 2024-04-30 PROCEDURE — 99211 OFF/OP EST MAY X REQ PHY/QHP: CPT

## 2024-04-30 PROCEDURE — 97112 NEUROMUSCULAR REEDUCATION: CPT

## 2024-04-30 PROCEDURE — 97162 PT EVAL MOD COMPLEX 30 MIN: CPT

## 2024-04-30 NOTE — PROGRESS NOTES
VESTIBULAR EVALUATION:   Referring Physician: Ann Bragg  Diagnosis: vestibular schwannoma (D33.3)   S/P craniotomy (Z98.890)  Left facial weakness (G51.0) Date of Service: 4/30/2024   Insurance:  [employer] [facility]      PATIENT SUMMARY   Irma Garcia is a 58 year old female who presents to therapy today with her boyfriend present.  Pt. reports left facial nerve weakness, imbalance following vestibular schwannoma resection 4/15/24.    History/onset of current condition: Pt. Reports that she had surgery 4/15, has noted improved dizziness and imbalance, improved facial sensation, worse hearing left ear and worse facial function- unable to move left side of face, unable to close left eye, which is painful.      Symptoms with cough/sneeze or loud noise: No  Falls: No  Hx of migraines: No  Hx of vision issue: Eye painful and blurry since surgery  Hx of hearing issues: impaired hearing left, has not yet had repeat audiogram    Pain:  Eye pain 4-7/10, head pain 3-5/10- pressure, different from a headache.    Current functional limitations include difficulty sleeping- unable to close eye, using tape and gel at night.  Eating- food falls out, unable to manage food,  pocketing of food on the left. Difficulty breathing through left side of nose, brushing teeth, unable to spit.  Speaking- difficulty with enunciating, difficulty talking on phone- holds cheek up, drinking- using bottle or straw, dribbles out left side.  DIfficulty sleeping- eye discomfort.      Social history:  Lives with boyfriend, usually works as hairdresser.  Not working, plans to come back mid-May.    Irma describes prior level of function:  No limitations, Walking for exercise, low impact aerobic exercise.  Pt goals include be able to move left side of face, return to work, full function.  Past medical history was reviewed with Irma. Significant findings include Carpal tunnel syndrome, vitamin D deficiency, kidney stones, H pylori infection,  vestibular schwannoma.    ASSESSMENT  Irma presents to physical therapy evaluation with primary c/o left facial weakness, eye pain and imbalance that is limiting function. The results of the objective tests and measures show impaired to absent facial function on left side, unable to achieve eye closure.  Functional deficits include but are not limited to unable to work, drive, close left eye.  Difficulty eating, Difficulty breathing through left side of nose, brushing teeth, managing liquids.  Speaking- difficulty with enunciating, difficulty talking on phone, difficulty sleeping due to eye discomfort.   PT discussed evaluation findings, pathology, plan of care and home exercise program with pt.  Pt voiced understanding and performs home exercise program correctly.  Skilled Physical Therapy is medically necessary to address the above impairments and reach functional goals.    Precautions:  Fall Risk  OBJECTIVE:   Physical Exam:  Posture/Observation: pt. Arrived without assistive device, good posture, left facial/eye droop    Facial Nerve Volitional Movement Assessment  (0%= no movement, 100%= full movement)           At eval:  Raise eyebrows 0   Bring eyebrows down and together 0   Gently close eyes 20- upper only   Squint your lower eyelids up 0   Close eyes tightly 20-upper only   Smile evenly with your lips together 0   Big wide smile with lips apart 0   Raise upper lip while wrinkling nose 0   Move corner of mouth back toward ear 0   Press lips together 0   Move center of both lips left and right 0   Pull lips back and in over your teeth 0   Push lips out as far as they will go (pucker) 0   Roll lower lip out and down (chin tightening) 0   Pull lower lip down to expose lower teeth 0   Tighten your neck 0   Make an “o” with lips 0   Blow cheeks out with air 0     Instructed pt. In eye care- using plastic patch with eye gel at night, using frequent drops with passive eye closure with fingers frequently during  day, at least hourly  Instructed pt. In self- massage for left facial mobility, eye closure- pt's boyfriend also present to support and clarify at home    Today's Treatment and Response:   Pt education was provided on exam findings, treatment diagnosis, treatment plan, expectations, and prognosis. Pt was also provided recommendations for eye care, facial massage.   Patient was instructed in and issued a Home Exercise Program to address current impairments.    Charges: PT Eval Moderate Complexity, NMR       Total Timed Treatment: 50 min     Total Treatment Time: 50 min     Based on clinical rationale and outcome measures, this evaluation involved Moderate Complexity decision making due to 3+ personal factors/comorbidities, 3 body structures involved/activity limitations, and unstable symptoms including  dizziness and imbalance .  PLAN OF CARE:    Goals: (to be met in 8 visits)  1. Improved facial function, as evidenced by improvement in Facial Disability Index to at least 50/100 for physical and social /well-being subsets.  2. Pt. Able to achieve full left eye closure indep.  3. Pt. Able to manage liquids orally without leakage when drinking from a standard cup.  4. Pt. Able to perform home exercises indep for maintenance of functional gains.    Frequency / Duration: Patient will be seen for 2 x/week or a total of 8 visits over a 90 day period. Treatment will include: home exercise program development and instruction, patient/family education, balance training as appropriate, neuromuscular re-education for facial function.    Education or treatment limitation: None   Rehab Potential: good     Patient was advised of these findings, precautions, and treatment options and has agreed to actively participate in planning and for this course of care.     Thank you for your referral. Please co-sign or sign and return this letter via fax as soon as possible to [phone]. If you have any questions, please contact me at  [phone].    Sincerely,  Electronically signed by therapist: Nely Joy PT  Physician's certification required: Yes  I certify the need for these services furnished under this plan of treatment and while under my care.    X___________________________________________________ Date____________________    Certification From: 4/30/24  to   7/28/24

## 2024-04-30 NOTE — PROGRESS NOTES
[facility]   Outpatient Neurological Surgery Follow Up    Irma Garcia  : 1965  PCP: Waylon Tong MD  Referring Provider: No ref. provider found    REASON FOR VISIT:No chief complaint on file.      HISTORY OF PRESENT ILLNESS:  Irma Garcia is a 58 year old female who presents today for a 2 week post op visit.  Pt has had no improvement in her left facial droop.  Pt has had worsening in her hearing.  Her preop facial numbness and dizziness is improved post op.  She has started PT with a specialist who specializes in facial nerve dysfunctions.      PAST MEDICAL HISTORY:  Past Medical History:    Abnormal maternal glucose tolerance, complicating pregnancy, childbirth, or the puerperium, unspecified as to episode of care    Acute upper respiratory infections of unspecified site    Allergic rhinitis, cause unspecified    Anxiety    Carpal tunnel syndrome    HERNAN (obstructive sleep apnea)    AHI-5    Other B-complex deficiencies    Sleep apnea    Mild sleep apnea - no treatment    Unspecified vitamin D deficiency    Visual impairment    glasses       PAST SURGICAL HISTORY:  Past Surgical History:   Procedure Laterality Date    Colonoscopy  6/10/16    D & c  99    Endometrial ablation      Tonsillectomy         SOCIAL HISTORY:   reports that she quit smoking about 3 months ago. Her smoking use included cigarettes. She started smoking about 9 years ago. She has a 2.7 pack-year smoking history. She has never used smokeless tobacco. She reports current alcohol use of about 3.0 standard drinks of alcohol per week. She reports that she does not currently use drugs after having used the following drugs: Cannabis.      ALLERGIES:  Allergies   Allergen Reactions    Flagyl [Metronidazole Hcl] HIVES and SWELLING    Metronidazole HIVES       MEDICATIONS:  Current Outpatient Medications   Medication Sig Dispense Refill    oxyCODONE 5 MG Oral Tab Take 1-2 tablets (5-10 mg total) by mouth  every 4 (four) hours as needed for Pain. 60 tablet 0    Naloxone HCl 4 MG/0.1ML Nasal Liquid 4 mg by Nasal route as needed. If patient remains unresponsive, repeat dose in other nostril 2-5 minutes after first dose. 1 kit 0    ondansetron 4 MG Oral Tablet Dispersible Take 1 tablet (4 mg total) by mouth every 8 (eight) hours as needed for Nausea. 20 tablet 0    buPROPion  MG Oral Tablet 12 Hr Take 1 tablet (150 mg total) by mouth daily. 90 tablet 1       REVIEW OF SYSTEMS:  Pertinent positives and negatives are noted in HPI.      PHYSICAL EXAMINATION:  VITAL SIGNS: LMP  (LMP Unknown)   GENERAL:  Patient is a 58 year old female in no apparent distress.  HEENT:  Normocephalic, atraumatic.  NEUROLOGICAL:  This patient is alert and orientated x 3.  Speech fluent. Able to follow simple commands.  L facial droop present.  L eye does not completely close.  Remainder of CN GI.  Gait steady  INTEGUMENTARY:  Surgical incision healing well, Dermabond remains in place with Rapide      IMAGING:  Post op MRI shows resection of solid tumor with small remain cyst near the brainstem    ASSESSMENT:    ICD-10-CM    1. Vestibular schwannoma (HCC)  D33.3       2. S/P craniotomy  Z98.890       3. 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 Liu reviewed imaging with pt and spouse  -Repeat MRI in 3 months for follow up  2.   Ok to gently scrub incision while showering for the next 7 days.  After that ok, to scrub more aggressively to remove remaining Dermabond  3.   F/u 3 months after imaging.  F/u as previously scheduled for 6 week post op visit  4.   Discussed driving with patient.  Pt is ok to drive at this point from a neurosurgical standpoint  5.   Discussed at tumor board.  No indication for radiation at this point        Dr. Liu evaluated pt.  Ann MCCLENDON, juan acting as scribe       Total time spent:  20 minutes  Greater than 50% of the time was spent on counseling/coordination of care.  Nature of education  / counseling: Pathology, treatment options, and expected outcomes    MATILDE Beltran  4/30/2024, 2:07 PM

## 2024-04-30 NOTE — PATIENT INSTRUCTIONS
Irma's recommendations:    Use clear plastic sheild and gel in left eye at night.  Use gel lubricating drops (Sustane) frequently during the day, do not tape shut.  At least every hour, use 2 fingers to gently lift lower lid and blink eye shut several times.    Massage face:  From cheek in and up toward corner of your eye  From cheek out to your ear- smile at the same time  From center of jawline out toward ear.  Massage your face whenever it feels good- forehead all the way down.  5. Wear sunglasses outside, not inside.   52.6

## 2024-05-07 ENCOUNTER — APPOINTMENT (OUTPATIENT)
Dept: PHYSICAL THERAPY | Facility: HOSPITAL | Age: 59
End: 2024-05-07
Attending: INTERNAL MEDICINE
Payer: MEDICAID

## 2024-05-09 ENCOUNTER — OFFICE VISIT (OUTPATIENT)
Dept: PHYSICAL THERAPY | Facility: HOSPITAL | Age: 59
End: 2024-05-09
Attending: SOCIAL WORKER
Payer: MEDICAID

## 2024-05-09 PROCEDURE — 97112 NEUROMUSCULAR REEDUCATION: CPT

## 2024-05-09 NOTE — PATIENT INSTRUCTIONS
Irma's recommendations:    Use clear plastic sheild and gel in left eye at night.  2. At least every hour, use 1 finger to gently lift lower lid and blink eye shut several times.    3. Massage face- GENTLY  From cheek in and up toward corner of your eye  From cheek out to your ear- smile at the same time  From center of jawline out toward ear.  Massage your face whenever it feels good- forehead all the way down.  4. Wear sunglasses outside, not inside.    Lying down:  5.  Support your lower lid lightly with one finger, practice tightly closing eyes x 10 reps.  Try to do this 3x/day.  6.  Wrinkle your nose and look mad x 10, 3x/day.

## 2024-05-09 NOTE — PROGRESS NOTES
Date  5/9/24           Visit Number  2/8           NMR x           Ther EX            Ther Act            Gait Training            CRM             Manual x             Dx: Left facial weakness         Insurance:  [employer]    Visit # authorized:  8 visits until 7/29/24  Referring MD: [place]   Precautions: dry eye  Medication Changes since last visit?: No  Subjective: Pt. Reports that she has not had much change in her face, eye is still burning and painful at times, unable to close it or have any active movement on left side of face.  She has been using drops during day and gel at night for left eye.  She reports that the burning is worse lately.  She has been doing massage to face regularly.  Pt. Reports no dizziness or imbalance.    Objective:   Manual therapy: 10 min  Facial massage supine with emphasis on restoring facial symmetry    Neuromuscular re-education: 30 min  Eye closure in supine x 10 with manual support of lower lid  Nasal contraction/wrinkle x 10 with manual support    Patient Education: Discussed eye care, reviewed facial massage and eye closure.  Issued revised written HEP.  See pt. Instructions section for details.  Discussed not using electrical stim due to [facility] not recommending due to the risk of dysesthesias.      Assessment: Pt. Has slightly less eye droop in sitting, able to achieve 50% eye closure in supine. Very min contraction visible around nares with nose wrinkle.  No other voluntary movement noted.  Pt. Tolerated well without pain, has been maintaining soft tissue mobility well.        Plan for next few sessions: continue to work on eye closure in sitting and supine.    Charges: NMR x 3       Total Timed Treatment: 40 min  Total Treatment Time: 40 min

## 2024-05-14 ENCOUNTER — OFFICE VISIT (OUTPATIENT)
Dept: PHYSICAL THERAPY | Facility: HOSPITAL | Age: 59
End: 2024-05-14
Attending: INTERNAL MEDICINE
Payer: MEDICAID

## 2024-05-14 PROCEDURE — 97112 NEUROMUSCULAR REEDUCATION: CPT

## 2024-05-14 NOTE — PROGRESS NOTES
Date  5/9/24 5/14/24          Visit Number  2/8 3/8          NMR x x          Ther EX            Ther Act            Gait Training            CRM             Manual x x            Dx: Left facial weakness         Insurance:  [employer]    Visit # authorized:  8 visits until 7/29/24  Referring MD: Overcash   Precautions: dry eye  Medication Changes since last visit?: No  Subjective: Pt. Reports that she has not had much change in her face.  Pt. Reports that she has been wearing a shield over left eye that has been helping with dry eye and comfort- much less burning.  She reports that she has been more emotionally upset this week over her face. She reports doing her exercises regularly.    Objective:   Manual therapy: 10 min  Facial massage supine with emphasis on restoring facial symmetry    Neuromuscular re-education: 30 min  Eye closure in supine x 10 with manual support of lower lid  Nasal contraction/wrinkle x 10 with manual support  Raising eyebrows 2 x 5 reps    Patient Education: Discussed  facial massage and eye closure- encouraged pt to be more gentle with massage.  Issued revised written HEP.  See pt. Instructions section for details.  Discussed not using electrical stim due to [facility] not recommending due to the risk of dysesthesias.      Assessment: Pt. Has slightly less eye droop in sitting, able to achieve 50% eye closure in supine. Very min contraction visible around nares with nose wrinkle.  No other voluntary movement noted.  Pt. Tolerated well without pain, has been maintaining soft tissue mobility well.        Plan for next few sessions: continue to work on eye closure in sitting and supine.    Charges: NMR x 3       Total Timed Treatment: 40 min  Total Treatment Time: 40 min

## 2024-05-22 ENCOUNTER — OFFICE VISIT (OUTPATIENT)
Dept: PHYSICAL THERAPY | Facility: HOSPITAL | Age: 59
End: 2024-05-22
Attending: NEUROLOGICAL SURGERY
Payer: MEDICAID

## 2024-05-22 ENCOUNTER — TELEPHONE (OUTPATIENT)
Dept: PHYSICAL THERAPY | Facility: HOSPITAL | Age: 59
End: 2024-05-22

## 2024-05-22 PROCEDURE — 97140 MANUAL THERAPY 1/> REGIONS: CPT

## 2024-05-22 PROCEDURE — 97112 NEUROMUSCULAR REEDUCATION: CPT

## 2024-05-22 NOTE — PROGRESS NOTES
Date  5/9/24 5/14/24 5/22/24         Visit Number  2/8 3/8 4/8         NMR x x x         Ther EX            Ther Act            Gait Training            CRM             Manual x x x           Dx: Left facial weakness         Insurance:  [employer]    Visit # authorized:  8 visits until 7/29/24  Referring MD: Overcash   Precautions: dry eye  Medication Changes since last visit?: No  Subjective: Pt. Reports that she has not had much change in her face.  Pt. Reports that she has been wearing a shield over left eye at night.  She has been using drops every other day, as needed for dryness.  Pt. Reports that she has less burning but has noticed more blurred vision.   Pt. Reports that she was able to work 6 hours today for the first time.  She reports doing her exercises regularly.    Objective:   Manual therapy: 10 min  Facial massage supine with emphasis on restoring facial symmetry    Neuromuscular re-education: 30 min  Eye closure in supine x 10 with manual support of lower lid  Nasal contraction/wrinkle x 10 with manual support  Raising eyebrows 2 x 5 reps    Patient Education: Discussed  facial massage and eye closure.  Given pt's eye/vision reports, encouraged her to follow up with ophthalmologist consult.  Reviewed written HEP- no changes today.       Assessment: Pt. Continues to have approx 50% eye closure in supine, no other changes in voluntary movement noted.  Pt. Tolerated well without pain, has been maintaining soft tissue mobility well.        Plan for next few sessions: continue to work on eye closure in sitting and supine.    Charges: NMR x 1, Manual x 1       Total Timed Treatment: 30 min  Total Treatment Time: 30 min

## 2024-05-28 ENCOUNTER — OFFICE VISIT (OUTPATIENT)
Dept: NEUROLOGY | Facility: CLINIC | Age: 59
End: 2024-05-28

## 2024-05-28 ENCOUNTER — NURSE ONLY (OUTPATIENT)
Dept: HEMATOLOGY/ONCOLOGY | Facility: HOSPITAL | Age: 59
End: 2024-05-28
Attending: NEUROLOGICAL SURGERY

## 2024-05-28 VITALS
HEIGHT: 57.99 IN | BODY MASS INDEX: 23.51 KG/M2 | DIASTOLIC BLOOD PRESSURE: 69 MMHG | WEIGHT: 112 LBS | HEART RATE: 65 BPM | TEMPERATURE: 98 F | OXYGEN SATURATION: 99 % | RESPIRATION RATE: 16 BRPM | SYSTOLIC BLOOD PRESSURE: 108 MMHG

## 2024-05-28 DIAGNOSIS — D33.3 VESTIBULAR SCHWANNOMA (HCC): Primary | ICD-10-CM

## 2024-05-28 DIAGNOSIS — G51.0 FACIAL NERVE PALSY: ICD-10-CM

## 2024-05-28 DIAGNOSIS — Z98.890 S/P CRANIOTOMY: ICD-10-CM

## 2024-05-28 PROCEDURE — 99024 POSTOP FOLLOW-UP VISIT: CPT | Performed by: NEUROLOGICAL SURGERY

## 2024-05-28 PROCEDURE — 99211 OFF/OP EST MAY X REQ PHY/QHP: CPT

## 2024-05-28 NOTE — PROGRESS NOTES
[facility]   Outpatient Neurological Surgery Follow Up    Irma Garcia  : 1965  PCP: Waylon Tong MD  Referring Provider: No ref. provider found    REASON FOR VISIT:No chief complaint on file.      HISTORY OF PRESENT ILLNESS:  Irma Garcia is a 59 year old female who presents today for follow up.  She continues to have left facial nerve weakness.  She states she is starting to see some improvement.  She continues to have improvement in her mouth numbness and dizziness since surgery.  She reports mild pruritus to her surgical incision site        Previous HPI   Irma Garcia is a 58 year old female who presents today for a 2 week post op visit.  Pt has had no improvement in her left facial droop.  Pt has had worsening in her hearing.  Her preop facial numbness and dizziness is improved post op.  She has started PT with a specialist who specializes in facial nerve dysfunctions.     PAST MEDICAL HISTORY:  Past Medical History:    Abnormal maternal glucose tolerance, complicating pregnancy, childbirth, or the puerperium, unspecified as to episode of care    Acute upper respiratory infections of unspecified site    Allergic rhinitis, cause unspecified    Anxiety    Carpal tunnel syndrome    HERNAN (obstructive sleep apnea)    AHI-5    Other B-complex deficiencies    Sleep apnea    Mild sleep apnea - no treatment    Unspecified vitamin D deficiency    Visual impairment    glasses       PAST SURGICAL HISTORY:  Past Surgical History:   Procedure Laterality Date    Colonoscopy  6/10/16    D & c  99    Endometrial ablation      Tonsillectomy         SOCIAL HISTORY:   reports that she quit smoking about 4 months ago. Her smoking use included cigarettes. She started smoking about 9 years ago. She has a 2.7 pack-year smoking history. She has never used smokeless tobacco. She reports current alcohol use of about 3.0 standard drinks of alcohol per week. She reports that she does  not currently use drugs after having used the following drugs: Cannabis.      ALLERGIES:  Allergies   Allergen Reactions    Flagyl [Metronidazole Hcl] HIVES and SWELLING    Metronidazole HIVES       MEDICATIONS:  Current Outpatient Medications   Medication Sig Dispense Refill    oxyCODONE 5 MG Oral Tab Take 1-2 tablets (5-10 mg total) by mouth every 4 (four) hours as needed for Pain. 60 tablet 0    Naloxone HCl 4 MG/0.1ML Nasal Liquid 4 mg by Nasal route as needed. If patient remains unresponsive, repeat dose in other nostril 2-5 minutes after first dose. 1 kit 0    ondansetron 4 MG Oral Tablet Dispersible Take 1 tablet (4 mg total) by mouth every 8 (eight) hours as needed for Nausea. 20 tablet 0    buPROPion  MG Oral Tablet 12 Hr Take 1 tablet (150 mg total) by mouth daily. 90 tablet 1       REVIEW OF SYSTEMS:  Pertinent positives and negatives are noted in HPI.      PHYSICAL EXAMINATION:  VITAL SIGNS: LMP  (LMP Unknown)   GENERAL:  Patient is a 59 year old female in no apparent distress.  HEENT:  Normocephalic, atraumatic.  NEUROLOGICAL:  This patient is alert and orientated x 3.  Speech fluent. Able to follow simple commands.  L facial palsy, unable to fully close left eye.  Remainder of CN II - XII intact.  Sensory: intact.  Motor: 5/5.  Gait steady  INTEGUMENTARY:  Surgical incision well healed      IMAGING:  No new imaging to review    ASSESSMENT:    ICD-10-CM    1. Vestibular schwannoma (HCC)  D33.3       2. S/P craniotomy  Z98.890       3. Facial nerve palsy  G51.0             PLAN:  Repeat MRI brain in 6 weeks for 3 month post op visit  2.   Pt can either call once imaging completed or follow up in person for results.  3.  Continue therapy for facial palsy but discussed this should recover naturally with time      Dr. Liu evaluated pt.  IAnn, am acting as scribe          Total time spent:  15 minutes  Greater than 50% of the time was spent on counseling/coordination of care.  Nature  of education / counseling: Pathology, treatment options, and expected outcomes    MATILDE Beltran  5/28/2024, 2:16 PM

## 2024-06-04 ENCOUNTER — OFFICE VISIT (OUTPATIENT)
Dept: PHYSICAL THERAPY | Facility: HOSPITAL | Age: 59
End: 2024-06-04
Attending: NURSE PRACTITIONER
Payer: MEDICAID

## 2024-06-04 PROCEDURE — 97140 MANUAL THERAPY 1/> REGIONS: CPT

## 2024-06-04 PROCEDURE — 97112 NEUROMUSCULAR REEDUCATION: CPT

## 2024-06-04 NOTE — PROGRESS NOTES
Date  5/9/24 5/14/24 5/22/24 6/4/24        Visit Number  2/8 3/8 4/8 5/8        NMR x x x x        Ther EX            Ther Act            Gait Training            CRM             Manual x x x x          Dx: Left facial weakness         Insurance:  [employer] [facility]    Visit # authorized:  8 visits until 7/29/24  Referring MD: Kemarash   Precautions: dry eye  Medication Changes since last visit?: No  Subjective: Pt. Reports that she has continued to have eye pain, had severe pain over weekend, is trying to use drops more.  Current eye pain up to 4/10, using drops 3x/day, gel and eye mask at night.  Pt. Reports that she has to use glasses to protect from ceiling fan air blowing, wears a hat for protection as well.  Pt. Reports that she has been feeling some movement under the skin in her face, but not had much movement that she can visualize.  She reports doing her exercises regularly. Pt. Is working on obtaining an ophthalmologist appt.    Objective:   Manual therapy: 10 min  Facial massage supine with emphasis on restoring facial symmetry    Neuromuscular re-education: 30 min  Supine eye closure- manual support  Nasal contraction/wrinkle x 10 with manual support  Raising eyebrows 2 x 5 reps  Pucker x 5  Move lips right and left (with mirror/visual assist)x 5    Patient Education: Discussed  facial massage and eye closure. Issued revised written HEP.  See pt. Instructions section for details.      Assessment: Pt. Improved in eye closure to approx 70% in supine, no other changes in voluntary movement noted.  Pt. Tolerated well without pain, has been maintaining soft tissue mobility well.        Plan for next few sessions: continue to work on eye closure in sitting and supine.    Charges: NMR x 2, Manual x 1       Total Timed Treatment: 40 min  Total Treatment Time: 40 min

## 2024-06-04 NOTE — PATIENT INSTRUCTIONS
Irma's recommendations:    Use eye cover and gel in left eye at night.  2. At least every hour, use 1 finger to gently lift lower lid and blink eye shut several times.    3. Massage face- GENTLY  From cheek in and up toward corner of your eye  From cheek out to your ear- smile at the same time  From center of jawline out toward ear.  Massage your face whenever it feels good- forehead all the way down.  4. Wear sunglasses outside, not inside.    Lying down:  5.  Support your lower lid lightly with one finger, practice tightly closing eyes x 10 reps.  Try to do this 3x/day.  6.  Wrinkle your nose and look mad x 10, 3x/day.\  7. Pucker and push lips forward x 10  8. Move center of lips toward the left and then right, 10 times.

## 2024-06-18 ENCOUNTER — OFFICE VISIT (OUTPATIENT)
Dept: PHYSICAL THERAPY | Facility: HOSPITAL | Age: 59
End: 2024-06-18
Attending: NEUROLOGICAL SURGERY

## 2024-06-18 PROCEDURE — 97140 MANUAL THERAPY 1/> REGIONS: CPT

## 2024-06-18 PROCEDURE — 97112 NEUROMUSCULAR REEDUCATION: CPT

## 2024-06-18 NOTE — PATIENT INSTRUCTIONS
Irma's recommendations:    Use eye cover and gel in left eye at night.  2. At least every hour, use 1 finger to gently lift lower lid and blink eye shut several times.    3. Massage face- GENTLY  From cheek in and up toward corner of your eye  From cheek out to your ear- smile at the same time  From center of jawline out toward ear.  From under your nose out to your ear  From center of eyebrow up   From eyebrow down and in  From side of your nose up to the corner of your eye.  Massage your face whenever it feels good- forehead all the way down.  4. Wear sunglasses outside, not inside.    Lying down or sitting up:  2-3x/day  5.  (Lying down especially) Support your lower lid lightly with one finger, practice tightly closing eyes x 10 reps.  Try to do this 3x/day.  6.  Wrinkle your nose and look mad x 10  7. Pucker and push lips forward x 10  8. Move center of lips toward the left and then right x 10  9. Big wide smile x 10

## 2024-06-18 NOTE — PROGRESS NOTES
Date  5/9/24 5/14/24 5/22/24 6/4/24 6/18/24       Visit Number  2/8 3/8 4/8 5/8 6/8       NMR x x x x x       Ther EX            Ther Act            Gait Training            CRM             Manual x x x x x         Dx: Left facial weakness         Insurance:  Blue Cross [facility]    Visit # authorized:  8 visits until 7/29/24  Referring MD: Overcash   Precautions: dry eye  Medication Changes since last visit?: No  Subjective: Pt. Reports that she has continued to have eye pain up to 4/10, using drops 3x/day, gel and eye mask at night.  Pt. Reports that she has to use glasses to protect from ceiling fan air blowing, wears a hat for protection as well.  Pt. Reports that she has not seen any difference in facial function since last session.  She reports doing her exercises regularly.  Pt. Is waiting for authorization for an ophthalmologist appt.  Objective:   Manual therapy: 10 min  Facial massage supine with emphasis on restoring facial symmetry    Neuromuscular re-education: 30 min  Supine eye closure- manual support  Nasal contraction/wrinkle x 10 with manual support  Raising eyebrows 2 x 5 reps  Pucker x 5  Move lips right and left (with mirror/visual assist)x 5  Big smile with assist on left    Patient Education: Discussed considering discussing with surgeon and possibly consulting with a facial nerve specialist and ophthalmologist.   Issued revised written HEP.  See pt. Instructions section for details.      Assessment: Pt's eye closure unchanged at appox 70% in supine, no other changes in voluntary movement noted.  Pt. Tolerated well without pain, has been maintaining soft tissue mobility well.        Plan for next few sessions: continue to work on eye closure in sitting and supine.    Charges: NMR x 2, Manual x 1       Total Timed Treatment: 40 min  Total Treatment Time: 40 min

## 2024-06-23 ENCOUNTER — PATIENT MESSAGE (OUTPATIENT)
Dept: NEUROLOGY | Facility: CLINIC | Age: 59
End: 2024-06-23

## 2024-06-23 DIAGNOSIS — Z98.890 STATUS POST CRANIECTOMY: ICD-10-CM

## 2024-06-23 DIAGNOSIS — R29.810 FACIAL WEAKNESS: Primary | ICD-10-CM

## 2024-06-24 NOTE — TELEPHONE ENCOUNTER
Noted that patient has messaged asking for recommendations for a facial nerve specialist.      At [facility] with Dr. Liu in [facility] on 5/28/24:    \"HISTORY OF PRESENT ILLNESS:  Irma Garcia is a 59 year old female who presents today for follow up.  She continues to have left facial nerve weakness.  She states she is starting to see some improvement.  She continues to have improvement in her mouth numbness and dizziness since surgery.  She reports mild pruritus to her surgical incision site.    PLAN:  Repeat MRI brain in 6 weeks for 3 month post op visit  2.   Pt can either call once imaging completed or follow up in person for results.  3.  Continue therapy for facial palsy but discussed this should recover naturally with time\"    Patient underwent surgery on 4/15/24 with Dr. Liu:    LEFT IMAGE GUIDED RETROSIGMOID CRANIECTOMY FOR TUMOR RESECTION    Routed to DIONICIO Lorenzo.

## 2024-06-24 NOTE — TELEPHONE ENCOUNTER
From: Irma Garcia  To: Randall Liu  Sent: 6/23/2024 9:51 PM CDT  Subject: Facial paralysis     Hi, I'm still having no movement on my left side of face. Hoping you have suggestions or a facial nerve specialist you can refer me to.

## 2024-06-25 NOTE — TELEPHONE ENCOUNTER
Dr. Liu is out of office.  I discussed with his partners.  Recommend Dr. Roseann Cervantes at [place], who is an ENT and facial plastics specialist.    [employer] message sent to the patient.

## 2024-06-26 ENCOUNTER — OFFICE VISIT (OUTPATIENT)
Dept: PHYSICAL THERAPY | Facility: HOSPITAL | Age: 59
End: 2024-06-26
Attending: NEUROLOGICAL SURGERY

## 2024-06-26 PROCEDURE — 97112 NEUROMUSCULAR REEDUCATION: CPT

## 2024-06-26 PROCEDURE — 97140 MANUAL THERAPY 1/> REGIONS: CPT

## 2024-06-26 NOTE — PROGRESS NOTES
Date  5/9/24 5/14/24 5/22/24 6/4/24 6/18/24 6/26/24      Visit Number  2/8 3/8 4/8 5/8 6/8 7/8      NMR x x x x x x      Ther EX            Ther Act            Gait Training            CRM             Manual x x x x x x        Dx: Left facial weakness         Insurance:  [employer]    Visit # authorized:  8 visits until 7/29/24  Referring MD: Overcash   Precautions: dry eye  Medication Changes since last visit?: No  Subjective: Pt. Reports that she has continued to have eye pain up to 4/10, using drops 3x/day, gel and eye mask intermittently at night.  Pt. Reports that she has to use glasses to protect from ceiling fan air blowing, wears a hat for protection as well.  Pt. Reports that she has not seen any difference in facial function since last session.  She reports doing her exercises and facial massage as recommended.  Pt. Has an ophthalmologist appt. For tomorrow, has a referral to see a facial nerve specialist.  Objective:   Manual therapy: 10 min  Facial massage supine with emphasis on restoring facial symmetry    Neuromuscular re-education: 20 min  Supine eye closure- manual support  Nasal contraction/wrinkle x 10 with manual support  Raising eyebrows 2 x 5 reps  Pucker x 5  Move lips right and left (with mirror/visual assist)x 5  Big smile with assist on left    Patient Education: Discussed consulting with a facial nerve specialist and ophthalmologist.   Reviewed written HEP.  No changes today.      Assessment: Pt's eye closure at appox 85% in supine, no other changes in voluntary movement noted.  Pt. Tolerated well without pain, has been maintaining soft tissue mobility well.        Plan for next few sessions: Reassess next visit.    Charges: NMR x 1, Manual x 1       Total Timed Treatment: 30 min  Total Treatment Time: 30 min

## 2024-07-03 ENCOUNTER — OFFICE VISIT (OUTPATIENT)
Dept: PHYSICAL THERAPY | Facility: HOSPITAL | Age: 59
End: 2024-07-03
Attending: NEUROLOGICAL SURGERY
Payer: MEDICAID

## 2024-07-03 PROCEDURE — 97140 MANUAL THERAPY 1/> REGIONS: CPT

## 2024-07-03 PROCEDURE — 97112 NEUROMUSCULAR REEDUCATION: CPT

## 2024-07-03 NOTE — PROGRESS NOTES
Patient Name: Irma Garcia, : 1965, MRN: [id number]   Date:  7/3/2024  Referring Physician:  Ann Bragg    Diagnosis:  vestibular schwannoma (D33.3);  S/P craniotomy (Z98.890);  Left facial weakness (G51.0)    Insurance:  [employer]    Progress Summary    Pt has attended 8 visits in physical therapy.    Progress Note Start Date: 24 End Date: 7/3/24    Subjective:   Pt. Reports that she is feeling better overall since initial eval- her headache/head pain has resolved and she has been able to return to most functional activities.  Her facial function is still very impaired, with only a little improvement.  She continues to have eye pain although she has recently consulted with an ophthalmologist and is working on a better eye care plan.  She reports that she is able to achieve a little better eye closure, notes improved ability to manage liquids as she attempts to drink or rinse her mouth.  Eating, enunciating (dejuan at end of day when she is fatigued), drinking and mouth hygiene are still a challenge.  Pt. Reports distress over her facial function and appearance, dejuan. 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  Pt. Reports that she is doing her exercises and facial massage at least 1-2x/day.  Pain:  Eye pain 5-9/10 (was 4-7/10), head pain 0/10  (was 3-5/10- pressure, different from a headache)    Assessment: Pt. Has made some progress in her facial function since her surgical vestibular schwannoma resection in 2024.  She has begun to improve in eye closure, and has some observable control around lip and chin area.  Pt. Has been compliant with facial exercise and massage.  Pt. Has significant facial impairment which affects eating and drinking, mouth hygiene, speech clarity and facial expression.  She would benefit from continued skilled PT to address her impairments, improve facial function and work toward her goals.    Objective:   Facial Disability Index: Facial Function: 30/100;  Social well-being function: 24/100  Facial Nerve Volitional Movement Assessment  (0%= no movement, 100%= full movement)           At eval:       7/3/24  Raise eyebrows 0 0   Bring eyebrows down and together 0 0   Gently close eyes  (supine) 20- upper only 80- min lower eyelid mvmt   Squint your lower eyelids up   (supine) 0    Close eyes tightly   (supine) 20-upper only 90- min lower eyelid mvmt   Smile evenly with your lips together 0 0   Big wide smile with lips apart 0 0   Raise upper lip while wrinkling nose 0 0   Move corner of mouth back toward ear 0 0   Press lips together 0 20   Move center of both lips left and right 0 0   Pull lips back and in over your teeth 0 0   Push lips out as far as they will go (pucker) 0 10   Roll lower lip out and down (chin tightening) 0 0   Pull lower lip down to expose lower teeth 0 0   Tighten your neck 0 0   Make an “o” with lips 0 10   Blow cheeks out with air 0 0          Goals    1. Improved facial function, as evidenced by improvement in Facial Disability Index to at least 50/100 for physical and social /well-being subsets. (PROGRESSING TOWARD GOAL)  2. Pt. Able to achieve full left eye closure indep.  (PROGRESSING TOWARD GOAL)  3. Pt. Able to manage liquids orally without leakage when drinking from a standard cup.  (PROGRESSING TOWARD GOAL)   4. Pt. Able to perform home exercises indep for maintenance of functional gains.  (PROGRESSING TOWARD GOAL)         Rehab Potential: good    Plan: Continue skilled Physical Therapy 1 x/week or a total of 8 visits over a 90 day period. Treatment will include: neuromuscular re-education, manual therapy/facial massage/mobility, pt. Education, home exercise development and instruction.       Patient was advised of these findings, precautions, and treatment options and has agreed to actively participate in planning and for this course of care.    Charges: NMR x 2, manual x 1   Total Timed Treatment: 45 min  Total Treatment Time: 45  min    Thank you for your referral. If you have any questions, please contact me at [phone].    Sincerely,  Nely Joy PT, JULIO    Electronically signed by therapist: Nely Joy PT, JULIO    Physician's certification required: Yes  I certify the need for these services furnished under this plan of treatment and while under my care.    X___________________________________________________ Date____________________    Certification From: 7/3/2024  To:10/1/2024

## 2024-07-11 ENCOUNTER — OFFICE VISIT (OUTPATIENT)
Dept: PHYSICAL THERAPY | Facility: HOSPITAL | Age: 59
End: 2024-07-11
Attending: NEUROLOGICAL SURGERY
Payer: MEDICAID

## 2024-07-11 PROCEDURE — 97112 NEUROMUSCULAR REEDUCATION: CPT

## 2024-07-11 PROCEDURE — 97140 MANUAL THERAPY 1/> REGIONS: CPT

## 2024-07-11 NOTE — PROGRESS NOTES
Date  7/11/24           Visit Number  9/16           NMR x           Ther EX            Ther Act            Gait Training            CRM             Manual x             Dx: Left facial weakness         Insurance:  [employer]    Visit # authorized:  16 visits until 9/1/24  Referring MD: Overcash   Precautions: dry eye  Medication Changes since last visit?: No  Subjective: Pt. Reports that she has continued to have eye pain up to 4/10, using drops multiple times per day, gel and taping eye at night.  Pt. Has noted that she has not had significant change in function, but does note that she has improved facial symmetry at rest, less facial droop on left.  Pt. Has been exercising and massaging as prescribed.    Objective:   Manual therapy: 10 min  Facial massage supine with emphasis on restoring facial symmetry    Neuromuscular re-education: 30 min  Supine eye closure- manual support  Nasal contraction/wrinkle x 10 with manual support  Raising eyebrows 2 x 5 reps  Pucker x 5  Move lips right and left (with mirror/visual assist)x 5  Big smile with assist on left    Patient Education: Discussed consulting with a facial nerve specialist- pt. Has not made appt yet.  Reviewed written HEP.  No changes today.      Assessment: Pt does have improved chin tightening movement on left today, previously absent.  Pt. Still with some trace movement in lower lid elevation in supine only, no eye closure in sitting.  Pt. Tolerated well without pain, has been maintaining soft tissue mobility well.        Plan for next few sessions: blowing out straw    Charges: NMR x 2, Manual x 1       Total Timed Treatment: 40 min  Total Treatment Time: 40 min

## 2024-07-18 ENCOUNTER — OFFICE VISIT (OUTPATIENT)
Dept: PHYSICAL THERAPY | Facility: HOSPITAL | Age: 59
End: 2024-07-18
Attending: NEUROLOGICAL SURGERY
Payer: MEDICAID

## 2024-07-18 PROCEDURE — 97140 MANUAL THERAPY 1/> REGIONS: CPT

## 2024-07-18 PROCEDURE — 97112 NEUROMUSCULAR REEDUCATION: CPT

## 2024-07-18 NOTE — PATIENT INSTRUCTIONS
Irma's recommendations:    Use eye cover and gel in left eye at night.  2. At least every hour, use 1 finger to gently lift lower lid and blink eye shut several times.    3. Massage face- GENTLY  From cheek in and up toward corner of your eye  From cheek out to your ear- smile at the same time  From center of jawline out toward ear.  From under your nose out to your ear  From center of eyebrow up   From eyebrow down and in  From side of your nose up to the corner of your eye.  Massage your face whenever it feels good- forehead all the way down.  4. Wear sunglasses outside, not inside.    Lying down or sitting up:  2-3x/day  5.  (Lying down especially) Support your lower lid lightly with one finger, practice tightly closing eyes x 10 reps.  Try to do this 3x/day.  6.  Wrinkle your nose and look mad x 10  7. Pucker and push lips forward x 10  8. Move center of lips toward the left and then right x 10  9. Big wide smile x 10  10.  Hold straw in your lips, keeping jaw closed, on left side  11. Make an \"O\" with your lips

## 2024-07-18 NOTE — PROGRESS NOTES
Date  7/11/24 7/18/24          Visit Number  9/16 10/16          NMR x x          Ther EX            Ther Act            Gait Training            CRM             Manual x x            Dx: Left facial weakness         Insurance:  [employer]    Visit # authorized:  16 visits until 9/1/24  Referring MD: Overcash   Precautions: dry eye  Medication Changes since last visit?: No  Subjective: Pt. Reports that she has continued to have eye discomfort up to 7/10, using drops multiple times per day, gel and intermittently taping eye at night.  Pt. Has noted that she has not had significant change in function, but does note that she has improved facial symmetry at rest, less facial droop on left.  Pt. Has been exercising and massaging as prescribed.  Pt. Has appt with Roseann Gonzalez next week.    Objective:   Manual therapy: 10 min  Facial massage supine with emphasis on restoring facial symmetry    Neuromuscular re-education: 30 min  Supine eye closure- manual support  Nasal contraction/wrinkle x 10 with manual support  Raising eyebrows 2 x 5 reps  Pucker x 10  Move lips right and left (with mirror/visual assist)x 5  Big smile with assist on left  Holding wide straw in lips on left- supine and sitting  Lip approximation- supine and sitting  Making an O with lips- supine and sitting  Wide mouth opening    Patient Education: Discussed consulting with a facial nerve specialist- pt. Has appt next week.  Reviewed written HEP.  No changes today.      Assessment: Pt does have improved chin tightening movement on left today, also orbicularis oris movement, previously absent.  Pt. Still with some trace movement in lower lid elevation in supine only, no eye closure in sitting.  Pt. Tolerated well without pain, has been maintaining soft tissue mobility well.        Plan for next few sessions: work on lip approximation    Charges: NMR x 2, Manual x 1       Total Timed Treatment: 40 min  Total Treatment Time: 40 min

## 2024-07-19 ENCOUNTER — TELEPHONE (OUTPATIENT)
Dept: SURGERY | Facility: CLINIC | Age: 59
End: 2024-07-19

## 2024-07-19 NOTE — TELEPHONE ENCOUNTER
Rec'd ov notes from Roseann Cervantes , dept of otolaryngology. DOS 7/19/2024. (in epic also)  Endorsed to nurse's bin for provider review.

## 2024-07-25 ENCOUNTER — OFFICE VISIT (OUTPATIENT)
Dept: PHYSICAL THERAPY | Facility: HOSPITAL | Age: 59
End: 2024-07-25
Attending: NURSE PRACTITIONER
Payer: MEDICAID

## 2024-07-25 PROCEDURE — 97112 NEUROMUSCULAR REEDUCATION: CPT

## 2024-07-25 PROCEDURE — 97140 MANUAL THERAPY 1/> REGIONS: CPT

## 2024-07-25 NOTE — PROGRESS NOTES
Date  7/11/24 7/18/24 7/25/24         Visit Number  9/16 10/16 11/16         NMR x x x         Ther EX            Ther Act            Gait Training            CRM             Manual x x x           Dx: Left facial weakness         Insurance:  [employer] [facility]    Visit # authorized:  16 visits until 9/1/24  Referring MD: Overcash   Precautions: dry eye  Medication Changes since last visit?: No  Subjective: Pt. Reports that she saw Dr. Gonzalez who recommended eye support tape that has been helping with eye comfort.  Pt. Is scheduled for eye procedure for gold inserts into lid and supportive stitches for lower lid.  Pt. Has been doing exercises, less massage due to busy schedule but face does not feel too tight.  Objective:   Manual therapy: 10 min  Facial massage supine with emphasis on restoring facial symmetry    Neuromuscular re-education: 30 min  Supine eye closure- manual support  Nasal contraction/wrinkle x 10 with manual support  Raising eyebrows 2 x 5 reps  Pucker x 10  Move lips right and left (with mirror/visual assist)x 5  Holding wide straw in lips on left- sitting with mirror visual feedback  Lip approximation- sitting  Making an O with lips- sitting    Patient Education:  Reviewed written HEP.  No changes today, but encouraged pt to use mirror for visual feedback.      Assessment: No significant change today, but pt. Able to tolerate using mirror for visual feedback which will be helpful during exercise.      Plan for next few sessions: work on lip approximation    Charges: NMR x 2, Manual x 1       Total Timed Treatment: 40 min  Total Treatment Time: 40 min

## 2024-07-26 NOTE — TELEPHONE ENCOUNTER
Evaluation notes DOS 07/19/24 from Dr Roseann Cervantes's office-[facility] received by MA clinical staff, endorsed to provider for review.     Placed on Zhang's desk for review.

## 2024-07-31 ENCOUNTER — HOSPITAL ENCOUNTER (OUTPATIENT)
Dept: MRI IMAGING | Facility: HOSPITAL | Age: 59
Discharge: HOME | End: 2024-07-31
Attending: NURSE PRACTITIONER
Payer: MEDICAID

## 2024-07-31 DIAGNOSIS — G51.0 FACIAL NERVE PALSY: ICD-10-CM

## 2024-07-31 DIAGNOSIS — Z98.890 S/P CRANIOTOMY: ICD-10-CM

## 2024-07-31 DIAGNOSIS — D33.3 VESTIBULAR SCHWANNOMA (HCC): ICD-10-CM

## 2024-07-31 PROCEDURE — A9575 INJ GADOTERATE MEGLUMI 0.1ML: HCPCS | Performed by: NURSE PRACTITIONER

## 2024-07-31 PROCEDURE — 70553 MRI BRAIN STEM W/O & W/DYE: CPT | Performed by: NURSE PRACTITIONER

## 2024-07-31 RX ORDER — DIPHENHYDRAMINE HYDROCHLORIDE 50 MG/ML
10 INJECTION, SOLUTION INTRAMUSCULAR; INTRAVENOUS
Status: COMPLETED | OUTPATIENT
Start: 2024-07-31 | End: 2024-07-31

## 2024-07-31 RX ADMIN — DIPHENHYDRAMINE HYDROCHLORIDE 10 ML: 50 INJECTION, SOLUTION INTRAMUSCULAR; INTRAVENOUS at 14:59:00

## 2024-08-06 ENCOUNTER — OFFICE VISIT (OUTPATIENT)
Dept: PHYSICAL THERAPY | Facility: HOSPITAL | Age: 59
End: 2024-08-06
Attending: NURSE PRACTITIONER
Payer: MEDICAID

## 2024-08-06 PROCEDURE — 97112 NEUROMUSCULAR REEDUCATION: CPT

## 2024-08-06 NOTE — PROGRESS NOTES
Date  7/11/24 7/18/24 7/25/24 8/6/24        Visit Number  9/16 10/16 11/16 12/16        NMR x x x x        Ther EX            Ther Act            Gait Training            CRM             Manual x x x           Dx: Left facial weakness         Insurance:  [employer] [facility]    Visit # authorized:  16 visits until 9/1/24  Referring MD: Mahsa   Precautions: dry eye  Medication Changes since last visit?: No  Subjective: Pt. Reports that she has been doing ok, having some trouble with being in public and with friends due to facial paralysis.  She saw ophthalmologist who reported that eye closure had improved some since last appt.  She reports that she has continued to do exercises as prescribed. She is scheduled for eye procedure on 8/26.    Objective:     Neuromuscular re-education: 40 min  Supine and sitting eye closure- manual support, slow contract with min effort to start  Nasal contraction/wrinkle x 10 with manual support  Raising eyebrows x 5 reps  Pucker x 10  Holding wide straw in lips on left- sitting with mirror visual feedback  Lip approximation- sitting and supine with straw      Patient Education:  Reviewed written HEP.  No changes today, but encouraged pt to use mirror for visual feedback.      Assessment: No significant change today, continues to have some improvement in eye closure but improving slowly.       Plan for next few sessions: mouth control, eye closure    Charges: NMR x 3    Total Timed Treatment: 40 min  Total Treatment Time: 40 min

## 2024-08-08 ENCOUNTER — OFFICE VISIT (OUTPATIENT)
Dept: SURGERY | Facility: CLINIC | Age: 59
End: 2024-08-08
Payer: MEDICAID

## 2024-08-08 VITALS — DIASTOLIC BLOOD PRESSURE: 72 MMHG | SYSTOLIC BLOOD PRESSURE: 110 MMHG

## 2024-08-08 DIAGNOSIS — G51.0 FACIAL NERVE PALSY: ICD-10-CM

## 2024-08-08 DIAGNOSIS — D33.3 VESTIBULAR SCHWANNOMA (HCC): Primary | ICD-10-CM

## 2024-08-08 PROCEDURE — 99214 OFFICE O/P EST MOD 30 MIN: CPT | Performed by: NEUROLOGICAL SURGERY

## 2024-08-08 NOTE — PROGRESS NOTES
Neurosurgery Clinic Visit  2024    Irma Garcia PCP:  Waylon Tong MD    1965 MRN [id number]     HISTORY OF PRESENT ILLNESS:  Irma Garcia is a(n) 59 year old female here for follow-up status post left retrosigmoid craniotomy for acoustic neuroma  She is overall doing pretty well  She still having weakness in her left facial nerve  She saw an outside ENT for further treatments  The talk about doing a gold weight in her eye  She saw her ophthalmologist and has dry eye  He also notes she was moving her eyelid better  She is here for follow-up  She got her most recent MRI        PHYSICAL EXAMINATION:  Vital Signs:  /72 (BP Location: Left arm, Patient Position: Sitting, Cuff Size: adult)   LMP  (LMP Unknown)   Awake alert, orient x 3  Follows commands x 4  She does appear to have a little bit better tone on left side  Also noted moving her eyelid little bit      REVIEW OF STUDIES:    MRI reviewed which shows great resection of tumor with small residual and her brainstem moving back to anatomical position with no further cyst      ASSESSMENT and PLAN:  59-year-old female status post resection of left acoustic neuroma  Overall she is doing really well  She is worried about her face  We a long conversation about her facial nerve  And surgery was intact with no EMG firing as well as stimulation  I have reached out to her ENT doctor, Dr. Cervantes to discuss the case with her  Usually these resolve within a year  They are talking about possible nerve transposition's  She did discuss the possibly at 6 months  But she was unsure  I will speak with her doctor  We will discuss her care at tumor board  I will call her next week with the results of all those conversations  We reviewed her films in detail  All questions were answered  Patient appreciative        Time spent on counseling/coordination of care:  30 Minutes    Total time spent with patient:  30 Minutes      MD Ha Londono  [facility] [place]  8/8/2024  11:18 AM   Dictated but not proofread

## 2024-08-08 NOTE — PROGRESS NOTES
Established patient:  Reason for follow up: 3 month post op   LEFT IMAGE GUIDED RETROSIGMOID CRANIECTOMY FOR TUMOR RESECTION, NEUROMONITORING Left       States she having hearing loss and can't closed her left eye     New imaging or testing since your last office visit:      Imaging in chart

## 2024-08-08 NOTE — PATIENT INSTRUCTIONS
Refill policies:    Allow 2-3 business days for refills; controlled substances may take longer.  Contact your pharmacy at least 5 days prior to running out of medication and have them send an electronic request or submit request through the “request refill” option in your [employer] account.  Refills are not addressed on weekends; covering physicians do not authorize routine medications on weekends.  No narcotics or controlled substances are refilled after noon on Fridays or by on call physicians.  By law, narcotics must be electronically prescribed.  A 30 day supply with no refills is the maximum allowed.  If your prescription is due for a refill, you may be due for a follow up appointment.  To best provide you care, patients receiving routine medications need to be seen at least once a year.  Patients receiving narcotic/controlled substance medications need to be seen at least once every 3 months.  In the event that your preferred pharmacy does not have the requested medication in stock (e.g. Backordered), it is your responsibility to find another pharmacy that has the requested medication available.  We will gladly send a new prescription to that pharmacy at your request.    Scheduling Tests:    If your physician has ordered radiology tests such as MRI or CT scans, please contact Central Scheduling at [phone] right away to schedule the test.  Once scheduled, the [facility] Centralized Referral Team will work with your insurance carrier to obtain pre-certification or prior authorization.  Depending on your insurance carrier, approval may take 3-10 days.  It is highly recommended patients assure they have received an authorization before having a test performed.  If test is done without insurance authorization, patient may be responsible for the entire amount billed.      Precertification and Prior Authorizations:  If your physician has recommended that you have a procedure or additional testing performed the [facility]  Centralized Referral Team will contact your insurance carrier to obtain pre-certification or prior authorization.    You are strongly encouraged to contact your insurance carrier to verify that your procedure/test has been approved and is a COVERED benefit.  Although the [facility] Centralized Referral Team does its due diligence, the insurance carrier gives the disclaimer that \"Although the procedure is authorized, this does not guarantee payment.\"    Ultimately the patient is responsible for payment.   Thank you for your understanding in this matter.  Paperwork Completion:  If you require FMLA or disability paperwork for your recovery, please make sure to either drop it off or have it faxed to our office at [phone]. Be sure the form has your name and date of birth on it.  The form will be faxed to our Forms Department and they will complete it for you.  There is a 25$ fee for all forms that need to be filled out.  Please be aware there is a 10-14 day turnaround time.  You will need to sign a release of information (HANNA) form if your paperwork does not come with one.  You may call the Forms Department with any questions at [phone].  Their fax number is [phone].

## 2024-08-15 ENCOUNTER — TELEPHONE (OUTPATIENT)
Dept: SURGERY | Facility: CLINIC | Age: 59
End: 2024-08-15

## 2024-08-15 NOTE — TELEPHONE ENCOUNTER
Spoke with patient who returned Dr. Liu's call.  Per Dr. Liu, Nursing to inform Irma that:    -he will try to call patient back tomorrow, but he is not sure of the time since he will be in the OR.   -he spoke with Dr. Cervantes, and he would like Irma to know some feedback from their discussion.     Patient acknowledged and thanked Nursing for the call and for the information.    within normal limits

## 2024-08-16 ENCOUNTER — TELEPHONE (OUTPATIENT)
Dept: SURGERY | Facility: CLINIC | Age: 59
End: 2024-08-16

## 2024-08-22 ENCOUNTER — OFFICE VISIT (OUTPATIENT)
Dept: PHYSICAL THERAPY | Facility: HOSPITAL | Age: 59
End: 2024-08-22
Attending: NURSE PRACTITIONER
Payer: MEDICAID

## 2024-08-22 PROCEDURE — 97112 NEUROMUSCULAR REEDUCATION: CPT

## 2024-08-22 PROCEDURE — 97140 MANUAL THERAPY 1/> REGIONS: CPT

## 2024-08-22 NOTE — PROGRESS NOTES
Date  7/11/24 7/18/24 7/25/24 8/6/24 8/22/24       Visit Number  9/16 10/16 11/16 12/16 13/16       NMR x x x x x       Ther EX            Ther Act            Gait Training            CRM             Manual x x x  x         Dx: Left facial weakness         Insurance:  [employer] Cross [facility]    Visit # authorized:  16 visits until 9/1/24  Referring MD: Overcash   Precautions: dry eye  Medication Changes since last visit?: No  Subjective: Pt. Reports that she has been doing ok, having some trouble with being in public and with friends due to facial paralysis. She had to discontinue using tape for eye support, She reports that she has continued to do exercises as prescribed. She is scheduled for eye procedure on 8/26.      Objective:     Neuromuscular re-education: 40 min  Supine and sitting eye closure- manual support, slow contract with min effort to start  Nasal contraction/wrinkle x 10 with manual support  Raising eyebrows x 5 reps  Pucker x 10    Manual tx: 10 min:  STM to facial structures, manual support during eye closure, smile, nasal wrinkle x 5-10 ea    Patient Education:  Reviewed written HEP.  No changes today, but encouraged pt to use mirror for visual feedback.      Assessment: No significant change today, continues to have some improvement in eye closure but improving slowly.       Plan for next few sessions: mouth control, eye closure    Charges: NMR x 2, Man x 1    Total Timed Treatment: 40 min  Total Treatment Time: 40 min

## 2024-08-28 ENCOUNTER — APPOINTMENT (OUTPATIENT)
Dept: PHYSICAL THERAPY | Facility: HOSPITAL | Age: 59
End: 2024-08-28
Attending: NURSE PRACTITIONER
Payer: MEDICAID

## 2024-09-04 ENCOUNTER — OFFICE VISIT (OUTPATIENT)
Dept: PHYSICAL THERAPY | Facility: HOSPITAL | Age: 59
End: 2024-09-04
Attending: NURSE PRACTITIONER
Payer: MEDICAID

## 2024-09-04 PROCEDURE — 97112 NEUROMUSCULAR REEDUCATION: CPT

## 2024-09-04 PROCEDURE — 97140 MANUAL THERAPY 1/> REGIONS: CPT

## 2024-09-04 NOTE — PROGRESS NOTES
Date  7/11/24 7/18/24 7/25/24 8/6/24 8/22/24 9/4/24      Visit Number  9/16 10/16 11/16 12/16 13/16 14/16      NMR x x x x x x      Ther EX            Ther Act            Gait Training            CRM             Manual x x x  x x        Dx: Left facial weakness         Insurance:  [employer]    Visit # authorized:  16 visits until 9/1/24  Referring MD: Overcash   Precautions: dry eye  Medication Changes since last visit?: No  Subjective: Pt. Reports that she had eye procedure on 8/26- added eyelid weight, tightened left lower lid to improve eye closure.  Pt. Reports that her eye symptoms are significantly better.  She reports that she has been having difficulty with emotional issues related to facial paralysis, and is considering supportive help for that.  Pt. Reports no significant change in facial movement.  She reports that she has continued to do exercises as prescribed.   Objective:     Neuromuscular re-education: 40 min  Supine and sitting eye closure-  slow contract with min effort to start  Nasal contraction/wrinkle x 10 with manual support  Eye squinting- lower eyelid lifting x 10  Raising eyebrows x 5 reps  Eyebrows in and down x 5  Pucker x 10 - mirror for maintaining control in center  Frown x 10- emphasis on chin control  Big wide smile with manual support      Manual tx: 10 min:  STM to facial structures, manual support during smile, nasal wrinkle x 5-10 ea  Reviewed facial stretching around mouth    Patient Education:  Reviewed written HEP.  No changes today, but encouraged pt to use mirror for visual feedback.      Assessment: No significant change today, improved symmetry after manual soft tissue mobilization.      Plan for next few sessions: mouth control, lower lid control    Charges: NMR x 2, Man x 1    Total Timed Treatment: 40 min  Total Treatment Time: 40 min

## 2024-09-11 ENCOUNTER — APPOINTMENT (OUTPATIENT)
Dept: PHYSICAL THERAPY | Facility: HOSPITAL | Age: 59
End: 2024-09-11
Attending: NURSE PRACTITIONER
Payer: MEDICAID

## 2024-09-18 ENCOUNTER — OFFICE VISIT (OUTPATIENT)
Dept: PHYSICAL THERAPY | Facility: HOSPITAL | Age: 59
End: 2024-09-18
Attending: NURSE PRACTITIONER
Payer: MEDICAID

## 2024-09-18 PROCEDURE — 97112 NEUROMUSCULAR REEDUCATION: CPT

## 2024-09-18 PROCEDURE — 97140 MANUAL THERAPY 1/> REGIONS: CPT

## 2024-09-18 NOTE — PATIENT INSTRUCTIONS
Irma's recommendations:     1. Massage face- GENTLY  From cheek in and up toward corner of your eye  From cheek out to your ear- smile at the same time  From right side of jawline out toward ear.  From right side under your nose out to your ear  From center of eyebrow up   From eyebrow down and in  From side of your nose up to the corner of your eye.  Massage your face whenever it feels good- forehead all the way down.      2.  Wrinkle your nose and look mad x 10  3. Pucker and push lips forward x 10  4. Hold straw in your lips, move center of lips toward the left and then right x 10  5.  Hold straw in your lips, keeping jaw closed, on left side  6. Make an \"O\" with your lips  7. Expand cheek on left without hands, then on right while holding left side with hand.

## 2024-09-18 NOTE — PROGRESS NOTES
Patient Name: Irma Garcia, : 1965, MRN: [id number]   Date:  7/3/2024  Referring Physician:  Ann Bragg    Diagnosis:  vestibular schwannoma (D33.3);  S/P craniotomy (Z98.890);  Left facial weakness (G51.0)    Insurance:  [employer] Cross [facility]    Progress Summary    Pt has attended 8 visits in physical therapy.    Progress Note Start Date: 24 End Date: 7/3/24    Subjective:   Pt. Reports that facial function is still very impaired, with only a little improvement.  She underwent surgical placement of weight in left eyelid on , and reports that she has had improved eye comfort since surgery.  Pt. Reports that her eye symptoms are significantly better. She reports that she is able to achieve  much better eye closure, notes improved ability to manage liquids as she attempts to drink or rinse her mouth.  Eating, enunciating (dejuan at end of day when she is fatigued), drinking and mouth hygiene are still a challenge.  Pt. Reports significant distress over her facial function and appearance, dejuan. 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  Pt. Reports that she is doing her exercises and facial massage at least 1-2x/day.  Pain:  Eye pain 3-4/10 (was 4-7/10), head pain 0/10  (was 3-5/10- pressure, different from a headache  Assessment: Pt. Has made some progress in her facial function since her surgical vestibular schwannoma resection in 2024.  She has begun to improve in eye closure, and has some observable control around lip and chin area.  Pt. Has been compliant with facial exercise and massage.  Pt. Has significant facial impairment which affects eating and drinking, mouth hygiene, speech clarity and facial expression.  She would benefit from continued skilled PT to address her impairments, improve facial function and work toward her goals.    Objective:   Neuromuscular re-education: 30 min  Facial Disability Index: Facial Function: 30/100; Social well-being function: 24/100  Facial Nerve  Volitional Movement Assessment  (0%= no movement, 100%= full movement)           At eval:       7/3/24  Raise eyebrows 0 0   Bring eyebrows down and together 0 0   Gently close eyes  (supine) 20- upper only 80- min lower eyelid mvmt   Squint your lower eyelids up   (supine) 0    Close eyes tightly   (supine) 20-upper only 90- min lower eyelid mvmt   Smile evenly with your lips together 0 0   Big wide smile with lips apart 0 0   Raise upper lip while wrinkling nose 0 0   Move corner of mouth back toward ear 0 0   Press lips together 0 50   Move center of both lips left and right 0 0   Pull lips back and in over your teeth 0 0   Push lips out as far as they will go (pucker) 0 10   Roll lower lip out and down (chin tightening) 0 0   Pull lower lip down to expose lower teeth 0 0   Tighten your neck 0 0   Make an “o” with lips 0 10   Blow cheeks out with air 0 0       Supine and sitting eye closure-  slow contract with min effort to start  Nasal contraction/wrinkle x 10 with manual support  Eye squinting- lower eyelid lifting x 10  Raising eyebrows x 5 reps  Eyebrows in and down x 5  Pucker x 10 - mirror for maintaining control in center  Frown x 10- emphasis on chin control  Big wide smile with manual support    Manual tx: 10 min:  STM to facial structures, manual support during smile, nasal wrinkle x 5-10 ea  Reviewed facial stretching around mouth     Goals    1. Improved facial function, as evidenced by improvement in Facial Disability Index to at least 50/100 for physical and social /well-being subsets. (PROGRESSING TOWARD GOAL)  2. Pt. Able to achieve full left eye closure indep.  (PROGRESSING TOWARD GOAL)  3. Pt. Able to manage liquids orally without leakage when drinking from a standard cup.  (PROGRESSING TOWARD GOAL)   4. Pt. Able to perform home exercises indep for maintenance of functional gains.  (PROGRESSING TOWARD GOAL)         Rehab Potential: good    Plan: Continue skilled Physical Therapy 1 x/week or a  total of 8 visits over a 90 day period. Treatment will include: neuromuscular re-education, manual therapy/facial massage/mobility, pt. Education, home exercise development and instruction.       Patient was advised of these findings, precautions, and treatment options and has agreed to actively participate in planning and for this course of care.    Charges: NMR x 2, manual x 1   Total Timed Treatment: 40 min  Total Treatment Time: 40 min    Thank you for your referral. If you have any questions, please contact me at [phone].    Sincerely,  Nely Joy PT, JULIO    Electronically signed by therapist: Nely Joy PT, JULIO    Physician's certification required: Yes  I certify the need for these services furnished under this plan of treatment and while under my care.    X___________________________________________________ Date____________________    Certification From: 9/18/24 to 12/16/24

## 2024-09-23 RX ORDER — BUPROPION HYDROCHLORIDE 150 MG/1
150 TABLET, EXTENDED RELEASE ORAL DAILY
Qty: 90 TABLET | Refills: 1 | OUTPATIENT
Start: 2024-09-23

## 2024-09-23 NOTE — TELEPHONE ENCOUNTER
Irma Garcia requesting Medication Refill for:    Medication name and dose (copy and paste from medication list):   buPROPion  MG Oral Tablet 12 Hr90 [id number]/18/2024--Sig:   Take 1 tablet (150 mg total) by mouth daily.Route:   OralOrder #:   [id number]     If medication is not on medication list - transfer patient to RN queue for triage    Preferred Pharmacy:   [id number] - [place] - [address] AT [facility] [address] [place], [phone], [phone]   [address] [place] [address]   Phone: [phone] Fax: [phone]   Hours: Not open 24 hours       LOV: 4/1/2024   Last Refill date: 03/18/2024  Next Scheduled appointment: No future appointments on file         
18-Apr-2022 16:22

## 2024-09-24 NOTE — TELEPHONE ENCOUNTER
Irma Garcia requesting Medication Refill for:    Medication name and dose (copy and paste from medication list):   Medication Quantity Refills Start End   buPROPion  MG Oral Tablet 12 Hr 90 tablet 1 3/18/2024 --   Sig:   Take 1 tablet (150 mg total) by mouth daily.     Route:   Oral     Order #:   [id number]       If medication is not on medication list - transfer patient to RN queue for triage    Preferred Pharmacy:   [facility][id number] - [place], [place] - [address] AT [facility] [address] & [place], [phone], [phone]   [address] [place] [address]   Phone: [phone] Fax: [phone]   Hours: Not open 24 hours       LOV: 4/1/2024   Last Refill date: 3/18/24  Next Scheduled appointment:   Future Appointments   No Future appts.

## 2024-09-28 RX ORDER — BUPROPION HYDROCHLORIDE 150 MG/1
150 TABLET, EXTENDED RELEASE ORAL DAILY
Qty: 90 TABLET | Refills: 1 | OUTPATIENT
Start: 2024-09-28

## 2024-10-08 ENCOUNTER — TELEPHONE (OUTPATIENT)
Dept: INTERNAL MEDICINE CLINIC | Facility: CLINIC | Age: 59
End: 2024-10-08

## 2024-10-08 DIAGNOSIS — Z00.00 ROUTINE GENERAL MEDICAL EXAMINATION AT A HEALTH CARE FACILITY: Primary | ICD-10-CM

## 2024-10-08 NOTE — TELEPHONE ENCOUNTER
Future Appointments   Date Time Provider Department Center   12/9/2024  9:20 AM Waylon Tong MD [facility]     Orders to quest- Pt informed that labs need to be completed no sooner than 2 weeks prior to the appt. Pt aware to fast-no call back required    Patient requesting A1C be added to the rest of her labs

## 2024-10-15 ENCOUNTER — TELEPHONE (OUTPATIENT)
Dept: PHYSICAL THERAPY | Facility: HOSPITAL | Age: 59
End: 2024-10-15

## 2024-10-28 ENCOUNTER — TELEPHONE (OUTPATIENT)
Dept: INTERNAL MEDICINE CLINIC | Facility: CLINIC | Age: 59
End: 2024-10-28

## 2024-10-28 DIAGNOSIS — M95.2 FACIAL ASYMMETRY, ACQUIRED: ICD-10-CM

## 2024-10-28 DIAGNOSIS — D33.3 LEFT ACOUSTIC NEUROMA (HCC): Primary | ICD-10-CM

## 2024-10-28 NOTE — TELEPHONE ENCOUNTER
Patient called in requesting a referral to Roseann Cervantes for a facial nerve transplant.     LOV 4/1/24  Upcoming appt 12/9/24    Ok for referral? Do you want to see patient before giving her a referral? If yes can you squeeze her in soon?      Future Appointments   Date Time Provider Department Center   11/12/2024 10:30 AM Randall Liu MD [facility] [place]   12/9/2024  9:20 AM Waylon Tong MD [facility]

## 2024-11-11 ENCOUNTER — TELEPHONE (OUTPATIENT)
Dept: INTERNAL MEDICINE CLINIC | Facility: CLINIC | Age: 59
End: 2024-11-11

## 2024-11-11 NOTE — TELEPHONE ENCOUNTER
Received letter from [facility] they have approved the following services/procedures.  [id number]  [id number]  [id number]

## 2024-12-04 LAB
ABSOLUTE BASOPHILS: 51 CELLS/UL (ref 0–200)
ABSOLUTE EOSINOPHILS: 154 CELLS/UL (ref 15–500)
ABSOLUTE LYMPHOCYTES: 1921 CELLS/UL (ref 850–3900)
ABSOLUTE MONOCYTES: 564 CELLS/UL (ref 200–950)
ABSOLUTE NEUTROPHILS: 3010 CELLS/UL (ref 1500–7800)
ALBUMIN/GLOBULIN RATIO: 1.9 (CALC) (ref 1–2.5)
ALBUMIN: 4.3 G/DL (ref 3.6–5.1)
ALKALINE PHOSPHATASE: 68 U/L (ref 37–153)
ALT: 8 U/L (ref 6–29)
AST: 13 U/L (ref 10–35)
BASOPHILS: 0.9 %
BILIRUBIN, TOTAL: 0.4 MG/DL (ref 0.2–1.2)
BUN: 18 MG/DL (ref 7–25)
CALCIUM: 9.4 MG/DL (ref 8.6–10.4)
CARBON DIOXIDE: 31 MMOL/L (ref 20–32)
CHLORIDE: 103 MMOL/L (ref 98–110)
CHOL/HDLC RATIO: 3.4 (CALC)
CHOLESTEROL, TOTAL: 286 MG/DL
CREATININE: 0.86 MG/DL (ref 0.5–1.03)
EGFR: 78 ML/MIN/1.73M2
EOSINOPHILS: 2.7 %
GLOBULIN: 2.3 G/DL (CALC) (ref 1.9–3.7)
GLUCOSE: 89 MG/DL (ref 65–99)
HDL CHOLESTEROL: 85 MG/DL
HEMATOCRIT: 38.7 % (ref 35–45)
HEMOGLOBIN: 12.5 G/DL (ref 11.7–15.5)
LDL-CHOLESTEROL: 185 MG/DL (CALC)
LYMPHOCYTES: 33.7 %
MCH: 29.1 PG (ref 27–33)
MCHC: 32.3 G/DL (ref 32–36)
MCV: 90 FL (ref 80–100)
MONOCYTES: 9.9 %
MPV: 10.8 FL (ref 7.5–12.5)
NEUTROPHILS: 52.8 %
NON-HDL CHOLESTEROL: 201 MG/DL (CALC)
PLATELET COUNT: 295 THOUSAND/UL (ref 140–400)
POTASSIUM: 4.5 MMOL/L (ref 3.5–5.3)
PROTEIN, TOTAL: 6.6 G/DL (ref 6.1–8.1)
RDW: 12.6 % (ref 11–15)
RED BLOOD CELL COUNT: 4.3 MILLION/UL (ref 3.8–5.1)
SODIUM: 141 MMOL/L (ref 135–146)
TRIGLYCERIDES: 64 MG/DL
TSH W/REFLEX TO FT4: 2.04 MIU/L (ref 0.4–4.5)
WHITE BLOOD CELL COUNT: 5.7 THOUSAND/UL (ref 3.8–10.8)

## 2024-12-09 ENCOUNTER — OFFICE VISIT (OUTPATIENT)
Dept: INTERNAL MEDICINE CLINIC | Facility: CLINIC | Age: 59
End: 2024-12-09
Payer: MEDICAID

## 2024-12-09 VITALS
WEIGHT: 120 LBS | OXYGEN SATURATION: 96 % | RESPIRATION RATE: 16 BRPM | BODY MASS INDEX: 25.19 KG/M2 | TEMPERATURE: 97 F | HEIGHT: 57.99 IN | SYSTOLIC BLOOD PRESSURE: 100 MMHG | DIASTOLIC BLOOD PRESSURE: 72 MMHG | HEART RATE: 55 BPM

## 2024-12-09 DIAGNOSIS — D33.3 VESTIBULAR SCHWANNOMA (HCC): ICD-10-CM

## 2024-12-09 DIAGNOSIS — K63.5 BENIGN COLON POLYP: ICD-10-CM

## 2024-12-09 DIAGNOSIS — G47.33 OSA (OBSTRUCTIVE SLEEP APNEA): ICD-10-CM

## 2024-12-09 DIAGNOSIS — E78.00 PURE HYPERCHOLESTEROLEMIA: ICD-10-CM

## 2024-12-09 DIAGNOSIS — M20.41 HAMMER TOE OF RIGHT FOOT: ICD-10-CM

## 2024-12-09 DIAGNOSIS — F06.30 MOOD DISORDER IN CONDITIONS CLASSIFIED ELSEWHERE: ICD-10-CM

## 2024-12-09 DIAGNOSIS — N81.4 UTERINE PROLAPSE: ICD-10-CM

## 2024-12-09 DIAGNOSIS — Z12.31 ENCOUNTER FOR SCREENING MAMMOGRAM FOR MALIGNANT NEOPLASM OF BREAST: ICD-10-CM

## 2024-12-09 DIAGNOSIS — F41.8 DEPRESSION WITH ANXIETY: ICD-10-CM

## 2024-12-09 DIAGNOSIS — H04.129 DRY EYE: ICD-10-CM

## 2024-12-09 DIAGNOSIS — Z00.00 ANNUAL PHYSICAL EXAM: Primary | ICD-10-CM

## 2024-12-09 DIAGNOSIS — Z87.891 HISTORY OF TOBACCO USE: ICD-10-CM

## 2024-12-09 DIAGNOSIS — F90.9 ATTENTION DEFICIT HYPERACTIVITY DISORDER (ADHD), UNSPECIFIED ADHD TYPE: ICD-10-CM

## 2024-12-09 DIAGNOSIS — G51.0 FACIAL PARALYSIS: ICD-10-CM

## 2024-12-09 DIAGNOSIS — G56.02 LEFT CARPAL TUNNEL SYNDROME: ICD-10-CM

## 2024-12-09 PROBLEM — R45.89 DEPRESSED MOOD: Status: RESOLVED | Noted: 2020-09-30 | Resolved: 2024-12-09

## 2024-12-09 PROBLEM — Z01.812 ENCOUNTER FOR PREPROCEDURE SCREENING LABORATORY TESTING FOR COVID-19: Status: RESOLVED | Noted: 2024-04-16 | Resolved: 2024-12-09

## 2024-12-09 PROBLEM — Z11.52 ENCOUNTER FOR PREPROCEDURE SCREENING LABORATORY TESTING FOR COVID-19: Status: RESOLVED | Noted: 2024-04-16 | Resolved: 2024-12-09

## 2024-12-09 PROBLEM — R29.810 FACIAL DROOP: Status: RESOLVED | Noted: 2024-04-15 | Resolved: 2024-12-09

## 2024-12-09 PROBLEM — F41.9 ANXIETY: Status: RESOLVED | Noted: 2024-04-15 | Resolved: 2024-12-09

## 2024-12-09 PROBLEM — N81.6 FEMALE PROCTOCELE WITHOUT UTERINE PROLAPSE: Status: RESOLVED | Noted: 2024-04-01 | Resolved: 2024-12-09

## 2024-12-09 PROCEDURE — 90656 IIV3 VACC NO PRSV 0.5 ML IM: CPT | Performed by: INTERNAL MEDICINE

## 2024-12-09 PROCEDURE — 99396 PREV VISIT EST AGE 40-64: CPT | Performed by: INTERNAL MEDICINE

## 2024-12-09 PROCEDURE — 90471 IMMUNIZATION ADMIN: CPT | Performed by: INTERNAL MEDICINE

## 2024-12-09 RX ORDER — PSEUDOEPHEDRINE HCL 30 MG
100 TABLET ORAL 2 TIMES DAILY
COMMUNITY
Start: 2024-08-26

## 2024-12-09 RX ORDER — ATORVASTATIN CALCIUM 10 MG/1
10 TABLET, FILM COATED ORAL NIGHTLY
Qty: 90 TABLET | Refills: 1 | Status: SHIPPED | OUTPATIENT
Start: 2024-12-09

## 2024-12-09 NOTE — PROGRESS NOTES
Irma Garcia  5/5/1965    Chief Complaint   Patient presents with    Physical     Rm 7 SS  Reviewed Preventative/Wellness form with patient.         HPI:   Irma Garcia is a 59 year old female who presents for an annual physical examination.    Following resection of schwannoma in April, and placement of the left eyelid weight in August, and finally left masseteric to facial nerve transfer, right to left cross facial nerve graft, right parathyroidectomy, and right sural nerve harvest most recently on November 14, the patient continues to experience a left-sided facial droop/paralysis resulting in dry eye with subsequent increased tear formation.  There has not been any improvement in the asymmetry of her smile.  She recently did undergo Botox therapy with the hope for some improvement.  She reports continued symptoms of anxiety and depression related to the consequences to the above procedures.  She reports a strong social support.  However, her symptoms impact her to some degree on a daily basis, with more emotional periods once every few days.  She is interested in undergoing behavioral therapy.  Recent LDL cholesterol is 185.    Current Outpatient Medications   Medication Sig Dispense Refill    docusate sodium 100 MG Oral Cap Take 100 mg by mouth 2 (two) times daily.      buPROPion  MG Oral Tablet 12 Hr Take 1 tablet (150 mg total) by mouth daily. 90 tablet 0    ALPRAZolam 0.25 MG Oral Tab Take 1 tablet (0.25 mg total) by mouth daily as needed for Anxiety. 30 tablet 1      Allergies[1]   Past Medical History:    Abnormal maternal glucose tolerance, complicating pregnancy, childbirth, or the puerperium, unspecified as to episode of care    Acute upper respiratory infections of unspecified site    Allergic rhinitis, cause unspecified    Anxiety    Carpal tunnel syndrome    HERNAN (obstructive sleep apnea)    AHI-5    Other B-complex deficiencies    Sleep apnea    Mild sleep apnea - no treatment     Unspecified vitamin D deficiency    Visual impairment    glasses      Patient Active Problem List   Diagnosis    Attention deficit hyperactivity disorder (ADHD)    Mood disorder in conditions classified elsewhere    ADD (attention deficit disorder)    History of adenomatous polyp of colon    Benign colon polyp    Vertigo    Vestibular neuritis, unspecified laterality    Depressed mood    Depression with anxiety    Lipoma of back    Neuroma    Hammer toe of right foot    Bilateral tinnitus    Left carpal tunnel syndrome    Pure hypercholesterolemia    Tobacco dependence    HERNAN (obstructive sleep apnea)    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    Uterine prolapse    Vestibular schwannoma (HCC)    Facial droop    Anxiety    Tobacco use    Encounter for preprocedure screening laboratory testing for COVID-19      Past Surgical History:   Procedure Laterality Date    Colonoscopy  6/10/16    D & c  99    Endometrial ablation      Tonsillectomy        Family History   Problem Relation Age of Onset    Other (type 2 diabetes) Father       Social History     Socioeconomic History    Marital status:    Tobacco Use    Smoking status: Former     Current packs/day: 0.00     Average packs/day: 0.3 packs/day for 9.0 years (2.7 ttl pk-yrs)     Types: Cigarettes     Start date:      Quit date:      Years since quittin.9    Smokeless tobacco: Never   Vaping Use    Vaping status: Never Used   Substance and Sexual Activity    Alcohol use: Yes     Alcohol/week: 3.0 standard drinks of alcohol     Types: 3 Standard drinks or equivalent per week    Drug use: Not Currently     Types: Cannabis     Comment: 1-2 times monthly; edibles/smoking   Other Topics Concern    Caffeine Concern Yes    Exercise Yes     Social Drivers of Health     Financial Resource Strain: Medium Risk (2024)    Received from [facility]    Overall Financial Resource Strain (CARDIA)     Difficulty of Paying Living  Expenses: Somewhat hard   Food Insecurity: Food Insecurity Present (7/18/2024)    Received from [facility]    Hunger Vital Sign     Worried About Running Out of Food in the Last Year: Sometimes true     Ran Out of Food in the Last Year: Sometimes true   Transportation Needs: Unmet Transportation Needs (7/18/2024)    Received from [facility]    PRAPARE - Transportation     Lack of Transportation (Medical): No     Lack of Transportation (Non-Medical): Yes   Housing Stability: Low Risk  (7/18/2024)    Received from [facility]    Housing Stability Vital Sign     Unable to Pay for Housing in the Last Year: No     Number of Places Lived in the Last Year: 1     Unstable Housing in the Last Year: No         REVIEW OF SYSTEMS:   GENERAL: feels well otherwise  SKIN: no rashes  EYES:denies blurred vision or double vision  HEENT: Not congested  LUNGS: denies shortness of breath with exertion  CARDIOVASCULAR: denies chest pain on exertion  GI: no nausea or abdominal pain  NEURO: denies headaches    EXAM:   /72   Pulse 55   Temp 96.5 °F (35.8 °C) (Temporal)   Resp 16   Ht 4' 9.99\" (1.473 m)   Wt 120 lb (54.4 kg)   LMP  (LMP Unknown)   SpO2 96%   BMI 25.09 kg/m²   GENERAL: Well developed, well nourished,in no apparent distress  SKIN: No rashes,no suspicious lesions  EYES: Bilateral conjunctiva are clear  HEENT: atraumatic, normocephalic. TM WNL BL.  NECK: supple,no adenopathy,no bruits  LUNGS: clear to auscultation  CARDIO: RRR without murmur  GI: good BS's,no masses, HSM or tenderness    ASSESSMENT AND PLAN:   Irma Garcia is a 59 year old female who presents for an annual physical examination.    Outstanding screening and preventive measures:  Tetanus and shingles immunizations: Deferred at this time  Screening for breast cancer: Mammogram due in February    Vestibular schwannoma:  Post resection in April, followed by placement of the left eyelid  weight in August, and finally left masseteric to facial nerve transfer, right to left cross facial nerve graft, right parathyroidectomy, and right sural nerve harvest most recently on 11/14  Hemifacial spasm on right  Left acoustic neuroma  Facial paralysis with left dry eye  Following with oncology and neurology services    Depression and anxiety:  Variable symptoms secondary to physical health  Referred for behavioral therapy  Continue Wellbutrin 150 mg twice daily and infrequent alprazolam use    Pure hypercholesterolemia:  LDL of 185   Dietary management reinforced  Agreeable to initiate atorvastatin 10 mg daily  Repeat labs in 3 months ultrafast heart scan recommended    HERNAN:  Stable  Following with sleep service    History of tobacco use:  Achieved cessation    Hammer toe on right:  Stable     Uterine prolapse:  Stable     Left CTS:  Stable    ADD:  No current medical management    The patient indicates understanding of these issues and agrees to the plan.    TODAY'S ORDERS     Orders Placed This Encounter   Procedures    Fluzone trivalent vaccine, PF 0.5mL, 6mo+ ([id number])       Meds & Refills:  Requested Prescriptions      No prescriptions requested or ordered in this encounter       Imaging & Consults:  INFLUENZA VACCINE, TRI, PRESERV FREE, 0.5 ML  [facility]    No follow-ups on file.  There are no Patient Instructions on file for this visit.    All questions were answered and the patient agrees with the plan.     Thank you,  Waylon Tong MD       [1]   Allergies  Allergen Reactions    Flagyl [Metronidazole Hcl] HIVES and SWELLING    Metronidazole HIVES

## 2024-12-12 ENCOUNTER — TELEPHONE (OUTPATIENT)
Age: 59
End: 2024-12-12

## 2024-12-12 NOTE — TELEPHONE ENCOUNTER
Hello,    Sorry I missed you - I am reaching out from the [facility] Navigation department, following up on an order from your provider's office to assist in connecting you with resources for care. If you would like to discuss this further, please give us a call back at [phone], or for more immediate assistance you can contact our 24-hour help line at [phone]. We look forward to hearing from you soon.

## 2024-12-13 RX ORDER — ALPRAZOLAM 0.25 MG/1
0.25 TABLET ORAL DAILY PRN
Qty: 30 TABLET | Refills: 0 | Status: SHIPPED | OUTPATIENT
Start: 2024-12-13

## 2024-12-13 NOTE — TELEPHONE ENCOUNTER
Please review; protocol failed/ has no protocol      Please see patients MyChart Message         View All Conversations on this Encounter  Irma VALENCIA Jose BOUCHER [facility] Refills4 days ago     KD  Refills have been requested for the following medications:         ALPRAZolam 0.25 MG Oral Tab [Waylon Tong]      Patient Comment: I was hoping to get more than 15 if possible. I have been breaking them in half  and still run out.  I am careful not to abuse,  but it helps me to sleep when I'm anxious     Recent fills: 07/15/2025  Last Rx written: 07/14/2024  Last Office Visit: 12/09/2024    Recent Visits  Date Type Provider Dept   12/09/24 Office Visit Waylon Tong MD [facility] [address]       Requested Prescriptions   Pending Prescriptions Disp Refills    ALPRAZolam 0.25 MG Oral Tab 30 tablet 1     Sig: Take 1 tablet (0.25 mg total) by mouth daily as needed for Anxiety.       Controlled Substance Medication Failed - 12/13/2024  3:00 PM        Failed - This medication is a controlled substance - forward to provider to refill           Recent Outpatient Visits              4 days ago Annual physical exam    [facility], [address] [place] [address], [place]    Office Visit    2 months ago     [facility] Nely Joy, PT    Office Visit    3 months ago     [facility] Nely Joy, PT    Office Visit    3 months ago     [facility] Nely Joy, PT    Office Visit    4 months ago Vestibular schwannoma (HCC)    [facility]    [facility] Visit

## 2024-12-17 ENCOUNTER — TELEPHONE (OUTPATIENT)
Age: 59
End: 2024-12-17

## 2024-12-17 NOTE — TELEPHONE ENCOUNTER
Missael Solis,    Here are some therapy and psychiatry resources that may be a good fit. Please verify your insurance coverage with any providers that you may choose to call and schedule with directly. If there is anything else I can assist with, then please give me a call at [phone]. If you need more immediate assistance, or assistance outside of business hours, please contact the [facility] 24/7 helpline at [phone].    Psychiatry (these practices also offer therapy services):    [facility]   [address]  [address]  [place] [address]  Phone: [phone]    [facility]   [address]  [address]  [place] [address]  Phone: [phone]    [facility]  [address]  [address]  [place] [address]  Phone: [phone]    Therapy:     [facility]  [address]  [address]  [place], [place] [address]  Phone: [phone]    [place] [address] [place] [address]  Phone: [phone]    [facility]  [address] [place] [address]  [address]  [place] [address]   Phone: [phone]      Jeanie Solano (she/her/hers)  Patient Care Navigator Mental Health   [facility]ental Health Division  [phone] or 24/7 help line: [phone]  [email or web address]  Request an assessment or support »

## 2024-12-17 NOTE — TELEPHONE ENCOUNTER
The [facility] Navigation team has attempted to reach you in order to follow up on an order that was placed by Dr. Tong's office. Please give us a call back at [phone] to discuss care coordination and resources.

## 2024-12-18 ENCOUNTER — TELEPHONE (OUTPATIENT)
Dept: INTERNAL MEDICINE CLINIC | Facility: CLINIC | Age: 59
End: 2024-12-18

## 2024-12-19 NOTE — TELEPHONE ENCOUNTER
Approved    Prior authorization approved  Payer: [employer] [facility] Case ID: [id number]    [phone]  Note from payer: The case has been Approved from  [id number] to [id number]  Approval Details    Authorized from September 20, 2024 to December 19, 2025      Patient notified via [employer].

## 2024-12-27 RX ORDER — BUPROPION HYDROCHLORIDE 150 MG/1
150 TABLET, EXTENDED RELEASE ORAL DAILY
Qty: 90 TABLET | Refills: 3 | Status: SHIPPED | OUTPATIENT
Start: 2024-12-27

## 2024-12-27 NOTE — TELEPHONE ENCOUNTER
Refill passed per [facility] protocol.  Requested Prescriptions   Pending Prescriptions Disp Refills    BUPROPION  MG Oral Tablet 12 Hr [Pharmacy Med Name: BUPROPION SR 150MG TABLETS (12 H)] 90 tablet 0     Sig: TAKE 1 TABLET(150 MG) BY MOUTH DAILY       Psychiatric Non-Scheduled (Anti-Anxiety) Passed - 12/27/2024  9:52 AM        Passed - In person appointment or virtual visit in the past 6 mos or appointment in next 3 mos     Recent Outpatient Visits              2 weeks ago Annual physical exam    [facility], [address] [place] [address] Waylon Calzada MD    Office Visit    3 months ago     [facility] Nely Joy, PT    Office Visit    3 months ago     [facility] Nely Joy, PT    Office Visit    4 months ago     [facility] Nely Joy, PT    Office Visit    4 months ago Vestibular schwannoma (HCC)    [facility] Randall Marcial MD    Office Visit                      Passed - Depression Screening completed within the past 12 months           Recent Outpatient Visits              2 weeks ago Annual physical exam    [facility], [address] [place] [address] Waylon Calzada MD    Office Visit    3 months ago     [facility] Nely Joy, PT    Office Visit    3 months ago     [facility] Nely Joy, PT    Office Visit    4 months ago     [facility] Nely Joy, PT    Office Visit    4 months ago Vestibular schwannoma (HCC)    [facility] Randall Marcial MD    Office Visit

## 2025-03-13 DIAGNOSIS — E78.00 PURE HYPERCHOLESTEROLEMIA: ICD-10-CM

## 2025-03-17 RX ORDER — ATORVASTATIN CALCIUM 10 MG/1
10 TABLET, FILM COATED ORAL NIGHTLY
Qty: 90 TABLET | Refills: 1 | OUTPATIENT
Start: 2025-03-17

## 2025-03-18 ENCOUNTER — HOSPITAL ENCOUNTER (OUTPATIENT)
Dept: MRI IMAGING | Facility: HOSPITAL | Age: 60
Discharge: HOME | End: 2025-03-18
Attending: PHYSICIAN ASSISTANT
Payer: MEDICAID

## 2025-03-18 DIAGNOSIS — D33.3 VESTIBULAR SCHWANNOMA (HCC): ICD-10-CM

## 2025-03-18 PROCEDURE — 70553 MRI BRAIN STEM W/O & W/DYE: CPT | Performed by: PHYSICIAN ASSISTANT

## 2025-03-18 PROCEDURE — A9575 INJ GADOTERATE MEGLUMI 0.1ML: HCPCS | Performed by: PHYSICIAN ASSISTANT

## 2025-03-18 RX ORDER — DIPHENHYDRAMINE HYDROCHLORIDE 50 MG/ML
10 INJECTION, SOLUTION INTRAMUSCULAR; INTRAVENOUS
Status: COMPLETED | OUTPATIENT
Start: 2025-03-18 | End: 2025-03-18

## 2025-03-18 RX ADMIN — DIPHENHYDRAMINE HYDROCHLORIDE 10 ML: 50 INJECTION, SOLUTION INTRAMUSCULAR; INTRAVENOUS at 19:45:00

## 2025-03-19 ENCOUNTER — TELEPHONE (OUTPATIENT)
Dept: NEUROLOGY | Facility: CLINIC | Age: 60
End: 2025-03-19

## 2025-03-19 NOTE — TELEPHONE ENCOUNTER
Noted patient completed MRI brain on 3/18.  Please assist patient with scheduling follow-up with Dr. Liu for neuro-oncology clinic. Imaging will be reviewed by interdisciplinary tumor board prior to appointment.

## 2025-04-02 DIAGNOSIS — F06.30 MOOD DISORDER IN CONDITIONS CLASSIFIED ELSEWHERE: ICD-10-CM

## 2025-04-02 DIAGNOSIS — F90.9 ATTENTION DEFICIT HYPERACTIVITY DISORDER (ADHD), UNSPECIFIED ADHD TYPE: ICD-10-CM

## 2025-04-04 RX ORDER — DEXTROAMPHETAMINE SACCHARATE, AMPHETAMINE ASPARTATE MONOHYDRATE, DEXTROAMPHETAMINE SULFATE AND AMPHETAMINE SULFATE 2.5; 2.5; 2.5; 2.5 MG/1; MG/1; MG/1; MG/1
10 CAPSULE, EXTENDED RELEASE ORAL DAILY
Qty: 30 CAPSULE | Refills: 0 | OUTPATIENT
Start: 2025-04-04 | End: 2025-05-04

## 2025-04-04 RX ORDER — ALPRAZOLAM 0.25 MG
0.25 TABLET ORAL DAILY PRN
Qty: 30 TABLET | Refills: 0 | Status: SHIPPED | OUTPATIENT
Start: 2025-04-04

## 2025-04-04 NOTE — TELEPHONE ENCOUNTER
Please review. Protocol Failed; No Protocol      Recent fills: 12/13/2024  Last Rx written: 12/13/2024  Last office visit: 12/9/2024

## 2025-04-22 ENCOUNTER — OFFICE VISIT (OUTPATIENT)
Dept: NEUROLOGY | Facility: CLINIC | Age: 60
End: 2025-04-22
Payer: MEDICAID

## 2025-04-22 VITALS
SYSTOLIC BLOOD PRESSURE: 102 MMHG | HEIGHT: 58 IN | WEIGHT: 114 LBS | BODY MASS INDEX: 23.93 KG/M2 | HEART RATE: 81 BPM | DIASTOLIC BLOOD PRESSURE: 64 MMHG

## 2025-04-22 DIAGNOSIS — D33.3 ACOUSTIC NEUROMA (HCC): Primary | ICD-10-CM

## 2025-04-22 PROCEDURE — 99213 OFFICE O/P EST LOW 20 MIN: CPT | Performed by: NEUROLOGICAL SURGERY

## 2025-04-22 NOTE — PROGRESS NOTES
Patient with Vestibular schwannoma is here today for follow up and review of imaging    IMAGING:  - 3/18/25- MRI BRAIN/IAC

## 2025-04-22 NOTE — PATIENT INSTRUCTIONS

## 2025-04-22 NOTE — PROGRESS NOTES
Neurosurgery Clinic Visit  2025    Irma Garcia PCP:  Waylon Tong MD    1965 MRN [id number]     HISTORY OF PRESENT ILLNESS:  Irma Garcia is a(n) 59 year old female here for follow-up  She is 1 year out from acoustic neuroma resection  She is doing really well  Walk she been out hiking doing lots of activities  She has had a little bit of facial nerve reanimation done with ENT at [facility]  She is does have some more movement  She presents for follow-up      PHYSICAL EXAMINATION:  Vital Signs:  /64   Pulse 81   Ht 58\"   Wt 114 lb (51.7 kg)   BMI 23.83 kg/m²   Awake alert, orient x 3  Follows commands x 4  Left facial no weak      REVIEW OF STUDIES:    MRI from March compared to July as well as preoperative shows small residual tumor which is stable in size from July, brainstem back to normal position      ASSESSMENT and PLAN:  59-year-old female status post resection of acoustic neuroma  She is doing really well  She is getting a little bit of function with her other interventions in her facial nerve  Her case discussed at tumor board  Radiation oncology would radiate the residual if she wishes to  Referral was ordered  She will see radiation oncology will make further plans from there  If she does not get radiation and have her follow back in 1 year with a repeat MRI  All questions were answered  Reviewed films in detail  Patient has been appreciative        Time spent on counseling/coordination of care:  20 minutes    Total time spent with patient:  20 minutes      Randall Liu MD   [facility]  2025  1:29 PM   Dictated but not proofread

## 2025-05-05 DIAGNOSIS — F90.9 ATTENTION DEFICIT HYPERACTIVITY DISORDER (ADHD), UNSPECIFIED ADHD TYPE: ICD-10-CM

## 2025-05-05 DIAGNOSIS — E78.00 PURE HYPERCHOLESTEROLEMIA: ICD-10-CM

## 2025-05-05 RX ORDER — DEXTROAMPHETAMINE SACCHARATE, AMPHETAMINE ASPARTATE MONOHYDRATE, DEXTROAMPHETAMINE SULFATE AND AMPHETAMINE SULFATE 2.5; 2.5; 2.5; 2.5 MG/1; MG/1; MG/1; MG/1
10 CAPSULE, EXTENDED RELEASE ORAL DAILY
Qty: 30 CAPSULE | Refills: 0 | OUTPATIENT
Start: 2025-05-05 | End: 2025-06-04

## 2025-05-06 RX ORDER — ATORVASTATIN CALCIUM 10 MG/1
10 TABLET, FILM COATED ORAL NIGHTLY
Qty: 90 TABLET | Refills: 3 | Status: SHIPPED | OUTPATIENT
Start: 2025-05-06

## 2025-05-06 NOTE — TELEPHONE ENCOUNTER
Last fill:  Pure hypercholesterolemia:  LDL of 185   Dietary management reinforced  Agreeable to initiate atorvastatin 10 mg daily  Repeat labs in 3 months ultrafast heart scan recommended    Is refill appropriate, Medication refill pended for your review/approval

## 2025-05-06 NOTE — PROGRESS NOTES
Nursing Consultation Note  Patient: Irma Garcia  YOB: 1965  Age: 60 year old  Radiation Oncologist: Dr. Janes Trejo  Referring Physician: Randall Liu  Diagnosis:[unfilled]  Consult Date: 5/6/2025      Chemotherapy: n/a  Labs: Reviewed  Imaging: Reviewed  Is the patient of child-bearing age?         No  Has the patient received radiation therapy in the past? no  Does the patient have an implantable device?No   Patient has/has had:     1. Assistive Devices: N/A    2. Flu Vaccination: yes    3. Pneumonia Vaccination:  no--referral to ask PCP    Vital Signs:   Vitals:    05/07/25 0949   BP: 127/84   Pulse: 75   Resp: 16   Temp: 98 °F (36.7 °C)   ,   Wt Readings from Last 6 Encounters:   05/07/25 53.1 kg (117 lb)   04/22/25 51.7 kg (114 lb)   12/09/24 54.4 kg (120 lb)   05/28/24 50.8 kg (112 lb)   04/30/24 47.6 kg (105 lb)   04/16/24 50 kg (110 lb 4.8 oz)       Nursing Note: Hx of acoustic neuroma with resection on 4/15/24. Has small residual tumor. MRI from 3/18/25 shows stable residual. Saw Dr. Liu 4/22/25- offered RT to residual tumor. Here for consult today. Feels well today. Pt has total hearing loss on L side. Has L sided facial paralysis since surgery. Has sensation to face, just unable to move. Has dizziness with closing eyes. Has balance difficulty, especially with eyes closed.     Review of Systems   Constitutional: Negative.    HENT:  Positive for tinnitus.         Total hearing loss in L ear   Eyes: Negative.         L eye - has weights in lid     Respiratory: Negative.     Cardiovascular: Negative.    Gastrointestinal: Negative.    Endocrine: Negative.    Genitourinary: Negative.    Musculoskeletal: Negative.    Skin: Negative.    Allergic/Immunologic: Positive for environmental allergies.   Neurological:  Positive for dizziness.   Hematological: Negative.    Psychiatric/Behavioral: Negative.           Allergies:  Allergies[1]    Current Medications[2]    Preferred  Pharmacy:    [id number] - [place], [place] - [address] AT [facility] [address] [place], [phone], [phone]  [address] [place] [address]  Phone: [phone] Fax: [phone]    [facility] - [place], [place] - [address], [address] [phone], [phone]  [address], [address]  [facility] [address]  Phone: [phone] Fax: [phone]      Past Medical History[3]    Past Surgical History[4]    Social History     Socioeconomic History    Marital status:      Spouse name: Not on file    Number of children: 3    Years of education: Not on file    Highest education level: Not on file   Occupational History    Not on file   Tobacco Use    Smoking status: Former     Current packs/day: 0.00     Average packs/day: 0.3 packs/day for 9.0 years (2.7 ttl pk-yrs)     Types: Cigarettes     Start date:      Quit date:      Years since quittin.3    Smokeless tobacco: Never    Tobacco comments:     Quit smoking      Smoked x 10 years maybe 2-5 cigarettes per day   Vaping Use    Vaping status: Never Used   Substance and Sexual Activity    Alcohol use: Yes     Alcohol/week: 3.0 standard drinks of alcohol     Types: 3 Standard drinks or equivalent per week    Drug use: Not Currently     Types: Cannabis     Comment: 1-2 times monthly; edibles/smoking    Sexual activity: Not on file   Other Topics Concern     Service Not Asked    Blood Transfusions Not Asked    Caffeine Concern Yes    Occupational Exposure Not Asked    Hobby Hazards Not Asked    Sleep Concern Not Asked    Stress Concern Not Asked    Weight Concern Not Asked    Special Diet Not Asked    Back Care Not Asked    Exercise Yes    Bike Helmet Not Asked    Seat Belt Not Asked    Self-Exams Not Asked   Social History Narrative    - lives with significant other     Social Drivers of Health     Food Insecurity: Food Insecurity Present (2024)    [facility]    Hunger Vital Sign     Worried About Running Out of Food in the Last Year: Sometimes true     Ran Out of Food in the Last Year: Sometimes true   Transportation Needs: Unmet Transportation Needs (7/18/2024)    Received from [facility]    PRAPARE - Transportation     Lack of Transportation (Medical): No     Lack of Transportation (Non-Medical): Yes   Housing Stability: Low Risk  (7/18/2024)    Received from [facility]    Housing Stability Vital Sign     Unable to Pay for Housing in the Last Year: No     Number of Places Lived in the Last Year: 1     Unstable Housing in the Last Year: No       ECOG:  Grade 0 - Fully active, able to carry on all predisease activities without restrictions.      Education:  Knowledge Deficit Plan Of Care:    Problem:  Knowledge Deficit    Problems related to:    Radiation therapy    Interventions:  Assess patient knowledge level  Assess knowledge needs  Instruct on treatment planning  Instruct on radiation therapy appointment scheduling  Instruct on purpose of radiation therapy  Instruct on side effects of radiation therapy    Expected Outcomes:  Knowledge of radiation therapy    Progress Toward Outcome:  Making progress    Pamphlets/Handouts Given to Patient:  Understanding radiation therapy      Are ADL's met?  Yes  Does patient feel safe in their environment?  Yes  Care decisions:  Patient and/or surrogate IS involved in care decisions.  Advanced directives:  Patient DOES NOT have advanced directives.  Transportation:  Adequate transportation available for expected visits    Pain: pt denies         [1]   Allergies  Allergen Reactions    Flagyl [Metronidazole Hcl] HIVES and SWELLING    Metronidazole HIVES   [2]   Current Outpatient Medications   Medication Sig Dispense Refill    atorvastatin 10 MG Oral Tab Take 1 tablet (10 mg total) by mouth nightly. 90 tablet 3    Magnesium Gluconate 500 (27 Mg) MG Oral Tab Take 1  tablet (500 mg total) by mouth at bedtime.      ALPRAZolam 0.25 MG Oral Tab Take 1 tablet (0.25 mg total) by mouth daily as needed for Anxiety. 30 tablet 0    amphetamine-dextroamphetamine ER (ADDERALL XR) 10 MG Oral Capsule SR 24 Hr Take 1 capsule (10 mg total) by mouth daily. 30 capsule 0    buPROPion  MG Oral Tablet 12 Hr Take 1 tablet (150 mg total) by mouth daily. 90 tablet 3    amphetamine-dextroamphetamine ER (ADDERALL XR) 10 MG Oral Capsule SR 24 Hr Take 1 capsule (10 mg total) by mouth daily. (Patient not taking: Reported on 5/7/2025) 30 capsule 0   [3]   Past Medical History:   Abnormal maternal glucose tolerance, complicating pregnancy, childbirth, or the puerperium, unspecified as to episode of care    Acute upper respiratory infections of unspecified site    Allergic rhinitis, cause unspecified    Anxiety    Carpal tunnel syndrome    HERNAN (obstructive sleep apnea)    AHI-5    Other B-complex deficiencies    Sleep apnea    Mild sleep apnea - no treatment    Unspecified vitamin D deficiency    Visual impairment    glasses   [4]   Past Surgical History:  Procedure Laterality Date    Colonoscopy  6/10/16    D & c  1/1/99    Endometrial ablation      Tonsillectomy

## 2025-05-07 ENCOUNTER — HOSPITAL ENCOUNTER (OUTPATIENT)
Dept: MAMMOGRAPHY | Age: 60
Discharge: HOME | End: 2025-05-07
Attending: INTERNAL MEDICINE
Payer: MEDICAID

## 2025-05-07 ENCOUNTER — HOSPITAL ENCOUNTER (OUTPATIENT)
Dept: RADIATION ONCOLOGY | Facility: HOSPITAL | Age: 60
Discharge: HOME | End: 2025-05-07
Attending: RADIOLOGY
Payer: MEDICAID

## 2025-05-07 VITALS
RESPIRATION RATE: 16 BRPM | TEMPERATURE: 98 F | OXYGEN SATURATION: 100 % | DIASTOLIC BLOOD PRESSURE: 84 MMHG | WEIGHT: 117 LBS | HEART RATE: 75 BPM | SYSTOLIC BLOOD PRESSURE: 127 MMHG | BODY MASS INDEX: 24 KG/M2

## 2025-05-07 DIAGNOSIS — Z12.31 ENCOUNTER FOR SCREENING MAMMOGRAM FOR MALIGNANT NEOPLASM OF BREAST: ICD-10-CM

## 2025-05-07 DIAGNOSIS — D33.3 VESTIBULAR SCHWANNOMA (HCC): Primary | ICD-10-CM

## 2025-05-07 PROCEDURE — 99214 OFFICE O/P EST MOD 30 MIN: CPT

## 2025-05-07 PROCEDURE — 77063 BREAST TOMOSYNTHESIS BI: CPT | Performed by: INTERNAL MEDICINE

## 2025-05-07 PROCEDURE — 77067 SCR MAMMO BI INCL CAD: CPT | Performed by: INTERNAL MEDICINE

## 2025-05-07 NOTE — CONSULTS
[facility]  RADIATION ONCOLOGY  CONSULTATION     PATIENT:   Irma Garcia        REFERRING MD:  Noe CAMPBELL MD  DIAGNOSIS:   Left acoustic neuroma      HPI   61 yo here for consult.     Presented last year with several years of increasing positional vestibular symptoms and hearing loss. No facial weakness, numbness or pain.    Had an MRI 3/18/2024 showing a cystic and solid 27 x 29 x 28 mm mass in the left CPA consistent with an acoustic neuroma. There was mild extension into the IAC. There was mass effect upon the fadumo and 4th ventricle. No significant edema.       On 4/15/2024, she underwent left retrosigmoid craniectomy and resection.  Neuromonitoring was unremarkable throughout as the cystic portion was decompressed and the tumor was maximally resected around the 7th nerve.  Pathology confirmed schwannoma.  Ki-67 was less than 3%.    Postoperative she had improvement of vestibular symptoms, now noticeable when she closes her eyes.  However she has no useful hearing on the left side.  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  No 5th nerve symptoms.  No double vision.  No swallowing issues.    Postop MRI 4/17/2024 shows excellent decompression of the left CPA.  MRI 7/2024 and 3/2025 shows continue regression of the surgical changes with stable 12 x 10 mm residual schwannoma. Stable enhancement within the left IAC.        In 8/2024 she had left eyelid weight placement at Dr. Billy Ferguson.  In 11/2024 she had facial nerve transfer (right to left facial and right sural) with substantial improvement.  No further procedures are planned.    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   No diplopia. No facial pain or numbness. No chewing weakness. No dysphagia. Slurs speech a bit.    PHYSICAL EXAM   ECO  GENERAL: 117 pounds.  HEENT: EOMI. L facial nerve palsy. Can smile. L eyelid weight.   CHEST: Regular rate and rhythm.    Clear to auscultation.   ABD:  Soft, non-tender.  EXT:  No edema.  NEURO: Alert and oriented.    IMPRESSION/PLAN   59 yo s/p decompressive resection of cystic/solid 3 cm left acoustic neuroma in 2024. Now about 1 year later her left facial nerve palsy is much improved with time and interventions by ENT as above. She does not have useful residual left hearing. She has good R hearing. Her vestibular symptoms are manageable for her and better after her surgery. Vision is ok and she does not have left facial pain.    Her MRI 3 months and 11 months post surgery show improvement in postop changes and stable 12 x 10 mm enhancing residual without mass effect on the fadumo or 4th ventricle. The extent of IAC involvement is stable.    We discussed role of RT in controlling regrowth of the tumor. We were in agreement to monitor with serial MRI and use RT at the first sign of significant progression. We do not plan to use 1,3 or 5 fraction SRS, but rather a gentle 25 to 28 fraction course of RT to minimize acute edema and late fibrosis of the treated area.     Risk of injuring CN5 is very low with RT. Risk of further aggravating CN7 with RT would be very low, though more time to allow further healing can't hurt. The main drawback in her case would be that RT could increase vestibular symptoms.     Plan:  MRI with 1 year interval, 3/2026  Review in office with me and Dr Noe Trejo MD  Radiation Oncology    CC: NEGRITA  MD Noe    50 minutes were spent with the patient, more than 50 percent on counseling/coordination of care (discuss disease status, goals of therapy, methods of radiation therapy, side effect discussion, role of RT in overall care)

## 2025-05-07 NOTE — PATIENT INSTRUCTIONS
- WE WILL CALL YOU FOR AN APPOINTMENT WITH DR. AREVALO AFTER YOUR MRI IN MARCH 2026    - PLEASE CALL CENTRAL SCHEDULING TO SCHEDULE MRI BRAIN [phone]      - IF YOU HAVE ANY QUESTIONS OR CONCERNS REGARDING RADIATION THERAPY, PLEASE CALL DR AREVALO'S NURSES - [facility] OR [facility] AT [phone].

## 2025-06-11 ENCOUNTER — PATIENT MESSAGE (OUTPATIENT)
Dept: INTERNAL MEDICINE CLINIC | Facility: CLINIC | Age: 60
End: 2025-06-11

## 2025-06-11 DIAGNOSIS — F90.9 ATTENTION DEFICIT HYPERACTIVITY DISORDER (ADHD), UNSPECIFIED ADHD TYPE: ICD-10-CM

## 2025-06-12 RX ORDER — DEXTROAMPHETAMINE SACCHARATE, AMPHETAMINE ASPARTATE MONOHYDRATE, DEXTROAMPHETAMINE SULFATE AND AMPHETAMINE SULFATE 2.5; 2.5; 2.5; 2.5 MG/1; MG/1; MG/1; MG/1
10 CAPSULE, EXTENDED RELEASE ORAL DAILY
Qty: 30 CAPSULE | Refills: 0 | Status: SHIPPED | OUTPATIENT
Start: 2025-06-12 | End: 2025-07-12

## 2025-06-12 NOTE — TELEPHONE ENCOUNTER
Medication Detail    Medication Quantity Refills Start End   amphetamine-dextroamphetamine ER (ADDERALL XR) 10 MG Oral Capsule SR 24 Hr () 30 capsule 0 2025   Sig:   Take 1 capsule (10 mg total) by mouth daily.     Patient not taking:   Reported on 2025     Route:   Oral     Earliest Fill Date:   2025     Order #:   [id number]       LOV 24     Dr. Tong- Pended 30 day refill if agreeable

## 2025-07-16 ENCOUNTER — PATIENT MESSAGE (OUTPATIENT)
Dept: INTERNAL MEDICINE CLINIC | Facility: CLINIC | Age: 60
End: 2025-07-16

## 2025-07-16 DIAGNOSIS — F90.9 ATTENTION DEFICIT HYPERACTIVITY DISORDER (ADHD), UNSPECIFIED ADHD TYPE: ICD-10-CM

## 2025-07-17 RX ORDER — DEXTROAMPHETAMINE SACCHARATE, AMPHETAMINE ASPARTATE MONOHYDRATE, DEXTROAMPHETAMINE SULFATE AND AMPHETAMINE SULFATE 2.5; 2.5; 2.5; 2.5 MG/1; MG/1; MG/1; MG/1
10 CAPSULE, EXTENDED RELEASE ORAL DAILY
Qty: 30 CAPSULE | Refills: 0 | Status: SHIPPED | OUTPATIENT
Start: 2025-07-17 | End: 2025-08-16

## 2025-08-19 ENCOUNTER — PATIENT MESSAGE (OUTPATIENT)
Dept: INTERNAL MEDICINE CLINIC | Facility: CLINIC | Age: 60
End: 2025-08-19

## 2025-08-19 DIAGNOSIS — F90.9 ATTENTION DEFICIT HYPERACTIVITY DISORDER (ADHD), UNSPECIFIED ADHD TYPE: ICD-10-CM

## 2025-08-19 RX ORDER — DEXTROAMPHETAMINE SACCHARATE, AMPHETAMINE ASPARTATE MONOHYDRATE, DEXTROAMPHETAMINE SULFATE AND AMPHETAMINE SULFATE 2.5; 2.5; 2.5; 2.5 MG/1; MG/1; MG/1; MG/1
10 CAPSULE, EXTENDED RELEASE ORAL DAILY
Qty: 30 CAPSULE | Refills: 0 | Status: CANCELLED
Start: 2025-08-19 | End: 2025-09-18

## (undated) DIAGNOSIS — F06.30 MOOD DISORDER IN CONDITIONS CLASSIFIED ELSEWHERE: ICD-10-CM

## (undated) DEVICE — UPPER EXTREMITY CDS-LF: Brand: MEDLINE INDUSTRIES, INC.

## (undated) DEVICE — IRRIGATION SUCTION CASSETTE: Brand: SONOPET IQ

## (undated) DEVICE — BATTERY PACK FOR VARISPEED: Brand: STRYKER VARISPEED

## (undated) DEVICE — LOW PROFILE (LP) BLADE ASSEMBLY 6PK: Brand: MICROAIRE®

## (undated) DEVICE — SUT MONOCRYL 4-0 P-3 Y494G

## (undated) DEVICE — 9.0MM ACORN

## (undated) DEVICE — TIDISHIELD SURGICAL PATIENT DRAPE WITH INVASIVE APERTURES BLUE STERILE UNIVERSAL NAVIGATIONAL CRANIOTOMY 8 PER CASE: Brand: TIDISHIELD

## (undated) DEVICE — COVER LT HNDL RIG FOR SUR CAM DISP

## (undated) DEVICE — STERILE POLYISOPRENE POWDER-FREE SURGICAL GLOVES: Brand: PROTEXIS

## (undated) DEVICE — DISPOSABLE BIPOLAR FORCEPS 4" (10.2CM) JEWELERS, STRAIGHT 0.4MM TIP AND 12 FT. (3.6M) CABLE: Brand: KIRWAN

## (undated) DEVICE — CATHETER IV 14GA L5.25IN ANGIOCATH STR FEP

## (undated) DEVICE — MARKER SKIN PREP RESIST STRL

## (undated) DEVICE — SLEEVE COMPR MD KNEE LEN SGL USE KENDALL SCD

## (undated) DEVICE — 3M™ STERI-STRIP™ REINFORCED ADHESIVE SKIN CLOSURES, R1547, 1/2 IN X 4 IN (12 MM X 100 MM), 6 STRIPS/ENVELOPE: Brand: 3M™ STERI-STRIP™

## (undated) DEVICE — SUT NRLN 4-0 18IN N ABSRB BLK CR L18MM TF

## (undated) DEVICE — CODMAN® SURGICAL PATTIES 1/2" X 1/2" (1.27CM X 1.27CM): Brand: CODMAN®

## (undated) DEVICE — ZEISS MD DRAPE

## (undated) DEVICE — GOWN SUR 2XL LEV 4 BLU W/ WHT V NK BND AERO

## (undated) DEVICE — SPONGE GZ 4X4IN COT 12 PLY TYP VII WVN C

## (undated) DEVICE — SOLUTION RUBBING 4OZ 70% ISO ALC CLR

## (undated) DEVICE — DISPOSABLE TOURNIQUET CUFF SINGLE BLADDER, DUAL PORT AND QUICK CONNECT CONNECTOR: Brand: COLOR CUFF

## (undated) DEVICE — CLOSURE EXOFIN 1.0ML

## (undated) DEVICE — PROXIMATE SKIN STAPLERS (35 WIDE) CONTAINS 35 STAINLESS STEEL STAPLES (FIXED HEAD): Brand: PROXIMATE

## (undated) DEVICE — SOLUTION  .9 1000ML BTL

## (undated) DEVICE — ADHESIVE SKIN TOP FOR WND CLSR DERMBND ADV

## (undated) DEVICE — GLOVE,SURG,SENSICARE SLT,LF,PF,8: Brand: MEDLINE

## (undated) DEVICE — SUT VCRL 2-0 18IN ABSRB UD CR L26MM CT-2

## (undated) DEVICE — ESMARCH P/F TEXTURED 4" X 9' 10/BX

## (undated) DEVICE — SUT VCRL 0 18IN CT-2 ABSRB VLT CR L26MM 1/2

## (undated) DEVICE — SPHERE NAVI 4 MRK PASS STLTH STN

## (undated) DEVICE — 2.3MM SPIRAL ROUTER

## (undated) DEVICE — STERILE POLYISOPRENE POWDER-FREE SURGICAL GLOVES WITH EMOLLIENT COATING: Brand: PROTEXIS

## (undated) DEVICE — PAD SACRAL SPAN AID

## (undated) DEVICE — 3M™ TEGADERM™ +PAD FILM DRESSING WITH NON-ADHERENT PAD, 3587, 3-1/2 IN X 4-1/8 IN (9 CM X 10.5 CM), 25/CAR, 4 CAR/CS: Brand: 3M™ TEGADERM™

## (undated) DEVICE — MONITORING NEUROPHYSIOLOGICAL

## (undated) DEVICE — SOLUTION IRRIG 1000ML 0.9% NACL USP BTL

## (undated) DEVICE — SUT VCRL RAPIDE 3-0 18IN ABSRB UD L19MM

## (undated) DEVICE — ISOPROPYL ALCOHOL 70% 4OZ BTL

## (undated) DEVICE — MINI-BLADE®: Brand: BEAVER®

## (undated) DEVICE — GAUZE SPONGES,12 PLY: Brand: CURITY

## (undated) DEVICE — SOLUTION ENDOSCOPIC ANTI-FOG NON-TOXIC NON-ABRASIVE 6 CUBIC CENTIMETER WITH RADIOPAQUE ADHESIVE-BACKED SPONGE STERILE NOT MADE WITH NATURAL RUBBER LATEX MEDICHOICE: Brand: MEDICHOICE

## (undated) DEVICE — CRANIOTOMY CDS: Brand: MEDLINE INDUSTRIES, INC.

## (undated) DEVICE — Device: Brand: INTELLICART™

## (undated) NOTE — MR AVS SNAPSHOT
[facility]  [address] [facility]  [place] [id number] [phone]               Thank you for choosing us for your health care visit with MATILDE Waggoner. We are glad to serve you and happy to provide you with this summary of your visit.   Adiel · An expectorant containing guaifenesin may help thin the mucus and promote drainage from the sinuses. · Over-the-counter decongestants may be used unless a similar medicine was prescribed.  Nasal sprays work the fastest. Use one that contains phenylephrin © 6532-6515 [address], [address]. All rights reserved. This information is not intended as a substitute for professional medical care. Always follow your healthcare professional's instructions.              Al Visit [facility] online at  [email or web address]

## (undated) NOTE — LETTER
Patient Name: Estrella Juarez  : 1965  MRN: [id number]  Patient Address: [address] [place] [id number]      Coronavirus Disease 2019 (COVID-19)     [facility] is committed to the safety and well-being of our patients, members 2. Monitor your symptoms carefully. If your symptoms get worse, call your healthcare provider immediately. 3. Get rest and stay hydrated.    4. If you have a medical appointment, call the healthcare provider ahead of time and tell them that you have or may ? At least 24 hours have passed since recovery defined as resolution of fever without the use of fever-reducing medications; and  · Improvement in respiratory symptoms (e.g., cough, shortness of breath); and  · At least 10 days have passed since symptoms f If you would be interested in donating your plasma to help treat others diagnosed with the virus, please contact Jeanette directly on their website: [email or web address]    Who is eligible to donate convalescent plasma?

## (undated) NOTE — LETTER
OUTSIDE TESTING RESULT REQUEST     IMPORTANT: FOR YOUR IMMEDIATE ATTENTION  Please FAX all test results listed below to: [phone]     Testing already done on or about: 2024     * * * * If testing is NOT complete, arrange with patient A.S.A.P. * * * *      Patient Name: Irma Garcia  Surgery Date: 4/15/2024  Medical Record: [id number]  CSN: [id number]  : 1965 - A: 58 y     Sex: female  Surgeon(s):  Randall Liu MD  Procedure: LEFT IMAGE GUIDED RETROSIGMOID CRANIECTOMY FOR TUMOR RESECTION, NEUROMONITORING  Anesthesia Type: General     Surgeon: Randall Liu MD     The following Testing and Time Line are REQUIRED PER ANESTHESIA     Chest X-Ray within 6 months  CBC [with Differential & Platelets] within  90 days  CMP (requires 4 hour fast) within  90 days  PT/INR within  30 days  PTT within  30 days  Type and Screen for Pre-Admission Testing (must be within 28 days of surgery)      Thank You,   Sent by: SOUMYA Hand

## (undated) NOTE — LETTER
02/22/21        [address] [address]      Dear Kae Ribeiro,    [address] records indicate that you have outstanding lab work and or testing that was ordered for you and has not yet been completed:  Orders Placed This Encounter

## (undated) NOTE — MR AVS SNAPSHOT
[facility]  [address] [facility]  [place] [id number] [phone]               Thank you for choosing us for your health care visit with MATILDE Mayfield. We are glad to serve you and happy to provide you with this summary of your visit.   Ple Your doctor may prescribe medications to help treat your sinusitis. If you have an infection, antibiotics can help clear it up. If you are prescribed antibiotics, take all pills on schedule until they are gone, even if you feel better.  Decongestants help r [address], [address] [facility]     Phone:  [phone]    - Amoxicillin-Pot Clavulanate 875-125 MG Tabs            Follow-up Instructions     Return if symptoms worsen or fail to improve.          MyChart     Visit MyChart  You can access your

## (undated) NOTE — LETTER
Patient Name: Branden Chen  : 1965  MRN: [id number]  Patient Address: [address] [place] [id number]      Coronavirus Disease 2019 (COVID-19)     [facility] is committed to the safety and well-being of our patients, members 2. Monitor your symptoms carefully. If your symptoms get worse, call your healthcare provider immediately. 3. Get rest and stay hydrated.    4. If you have a medical appointment, call the healthcare provider ahead of time and tell them that you have or may ? At least 24 hours have passed since recovery defined as resolution of fever without the use of fever-reducing medications; and  · Improvement in respiratory symptoms (e.g., cough, shortness of breath); and  · At least 10 days have passed since symptoms f If you would be interested in donating your plasma to help treat others diagnosed with the virus, please contact Jeanette directly on their website: [email or web address]    Who is eligible to donate convalescent plasma?

## (undated) NOTE — LETTER
Patient Name: Roseann Ricks  YOB: 1965          MRN :  [id number]  Date:  12/30/2020  Referring Physician:  Gris Travis    ProgressSummabharathi  Pt has attended 8 visits in Physical Therapy.    Dx: Vestibular neuritis, unspecified laterality (H81 · Patient will be able to turn head quickly without symptoms to improve ability to check blind spot while driving  --  GOAL MET (12/30/2020)  · Modified-CTSIB = WNL in all phases  --  GOAL MET (12/30/2020)  · Reach/look up to grab items from top shelf with

## (undated) NOTE — MR AVS SNAPSHOT
[address]  [address] [facility], [address] [address] [phone]               Thank you for choosing us for your health care visit with Sriram Newton DO.   We are glad to serve you and happy to provide you with this willis Assoc Dx: Annual physical exam [Z00.00]           CBC W Differential W Platelet    Complete by:  Jan 20, 2017 (Approximate)    Assoc Dx:   Annual physical exam [Z00.00]           Vitamin D, 25-Hydroxy    Complete by:  Jan 20, 2017 (Approximate)    Assoc D If you have questions, you can call [phone] to talk to our [facility] Staff. Remember, [id number] is NOT to be used for urgent needs. For medical emergencies, dial 911.         Educational Information     Healthy Diet and Regular Exercise  The Fou

## (undated) NOTE — Clinical Note
I had the pleasure of seeing Maritza Bravo on 2/3/2021. Please see my attached note.     Pete Mane MD FACS  [facility]--Surgery